# Patient Record
Sex: FEMALE | Race: WHITE | NOT HISPANIC OR LATINO | Employment: FULL TIME | URBAN - METROPOLITAN AREA
[De-identification: names, ages, dates, MRNs, and addresses within clinical notes are randomized per-mention and may not be internally consistent; named-entity substitution may affect disease eponyms.]

---

## 2017-03-01 ENCOUNTER — DOCUMENTATION (OUTPATIENT)
Dept: PHYSICAL THERAPY | Facility: HOSPITAL | Age: 62
End: 2017-03-01

## 2017-04-10 ENCOUNTER — OFFICE VISIT (OUTPATIENT)
Dept: RETAIL CLINIC | Facility: CLINIC | Age: 62
End: 2017-04-10

## 2017-04-10 VITALS
RESPIRATION RATE: 20 BRPM | OXYGEN SATURATION: 98 % | SYSTOLIC BLOOD PRESSURE: 124 MMHG | HEART RATE: 111 BPM | TEMPERATURE: 98.4 F | DIASTOLIC BLOOD PRESSURE: 80 MMHG

## 2017-04-10 DIAGNOSIS — J01.00 ACUTE NON-RECURRENT MAXILLARY SINUSITIS: Primary | ICD-10-CM

## 2017-04-10 PROCEDURE — 99213 OFFICE O/P EST LOW 20 MIN: CPT | Performed by: NURSE PRACTITIONER

## 2017-04-10 RX ORDER — AMOXICILLIN AND CLAVULANATE POTASSIUM 875; 125 MG/1; MG/1
1 TABLET, FILM COATED ORAL 2 TIMES DAILY
Qty: 14 TABLET | Refills: 0 | Status: SHIPPED | OUTPATIENT
Start: 2017-04-10 | End: 2017-04-17

## 2017-04-10 RX ORDER — CETIRIZINE HYDROCHLORIDE 10 MG/1
10 TABLET ORAL DAILY
COMMUNITY

## 2017-04-10 RX ORDER — FLUTICASONE PROPIONATE 50 MCG
2 SPRAY, SUSPENSION (ML) NASAL DAILY
COMMUNITY

## 2017-04-10 NOTE — PROGRESS NOTES
Chon Barry is a 62 y.o. female.     Sinusitis   This is a new problem. Episode onset: 10-11 days ago. The problem has been gradually worsening since onset. There has been no fever. Associated symptoms include congestion, coughing, headaches, sinus pressure and a sore throat. Pertinent negatives include no chills, diaphoresis, ear pain, hoarse voice, neck pain, shortness of breath or sneezing. Treatments tried: herbal remadies. The treatment provided no relief.       The following portions of the patient's history were reviewed and updated as appropriate: allergies, current medications, past family history, past medical history, past social history, past surgical history and problem list.    Review of Systems   Constitutional: Negative for appetite change, chills, diaphoresis, fatigue and fever.   HENT: Positive for congestion, postnasal drip, rhinorrhea, sinus pressure and sore throat. Negative for dental problem, ear discharge, ear pain, facial swelling, hearing loss, hoarse voice, mouth sores, nosebleeds, sneezing, tinnitus, trouble swallowing and voice change.    Eyes: Negative for pain, discharge, redness and itching.   Respiratory: Positive for cough. Negative for chest tightness, shortness of breath, wheezing and stridor.    Cardiovascular: Negative for chest pain and palpitations.   Gastrointestinal: Negative for abdominal pain, constipation, diarrhea, nausea and vomiting.   Genitourinary: Negative for decreased urine volume.   Musculoskeletal: Positive for myalgias. Negative for neck pain and neck stiffness.   Skin: Negative for rash.   Allergic/Immunologic: Positive for environmental allergies.   Neurological: Positive for headaches. Negative for dizziness, syncope and weakness.       Objective   Physical Exam   Constitutional: She is oriented to person, place, and time. She appears well-developed and well-nourished. She is cooperative.  Non-toxic appearance. She does not appear ill. No distress.    HENT:   Right Ear: Hearing, tympanic membrane, external ear and ear canal normal.   Left Ear: Hearing, tympanic membrane, external ear and ear canal normal.   Nose: Mucosal edema and rhinorrhea present. Right sinus exhibits maxillary sinus tenderness. Right sinus exhibits no frontal sinus tenderness. Left sinus exhibits maxillary sinus tenderness. Left sinus exhibits no frontal sinus tenderness.   Mouth/Throat: Uvula is midline and mucous membranes are normal. Posterior oropharyngeal erythema present. No oropharyngeal exudate or posterior oropharyngeal edema. Tonsils are 2+ on the right. Tonsils are 2+ on the left. No tonsillar exudate.   Eyes: Conjunctivae and lids are normal.   Cardiovascular: Normal rate, regular rhythm, S1 normal and S2 normal.    Pulmonary/Chest: Effort normal and breath sounds normal.   Abdominal: Soft. Bowel sounds are normal. There is no tenderness.   Lymphadenopathy:     She has no cervical adenopathy.   Neurological: She is alert and oriented to person, place, and time.   Skin: Skin is warm and dry. She is not diaphoretic. No pallor.   Vitals reviewed.      Assessment/Plan   Celina was seen today for sinusitis.    Diagnoses and all orders for this visit:    Acute non-recurrent maxillary sinusitis  -     amoxicillin-clavulanate (AUGMENTIN) 875-125 MG per tablet; Take 1 tablet by mouth 2 (Two) Times a Day for 7 days.          -     Follow up with PCP for persistent symptoms        -     Follow up with urgent treatment for worsening symptoms

## 2017-05-07 ENCOUNTER — OFFICE VISIT (OUTPATIENT)
Dept: RETAIL CLINIC | Facility: CLINIC | Age: 62
End: 2017-05-07

## 2017-05-07 ENCOUNTER — HOSPITAL ENCOUNTER (EMERGENCY)
Facility: HOSPITAL | Age: 62
Discharge: HOME | End: 2017-05-07
Attending: EMERGENCY MEDICINE | Admitting: EMERGENCY MEDICINE

## 2017-05-07 VITALS
HEIGHT: 62 IN | HEART RATE: 88 BPM | TEMPERATURE: 98.1 F | BODY MASS INDEX: 45.08 KG/M2 | WEIGHT: 245 LBS | OXYGEN SATURATION: 98 % | DIASTOLIC BLOOD PRESSURE: 92 MMHG | RESPIRATION RATE: 18 BRPM | SYSTOLIC BLOOD PRESSURE: 147 MMHG

## 2017-05-07 VITALS
HEART RATE: 76 BPM | RESPIRATION RATE: 18 BRPM | OXYGEN SATURATION: 98 % | TEMPERATURE: 97 F | DIASTOLIC BLOOD PRESSURE: 78 MMHG | SYSTOLIC BLOOD PRESSURE: 110 MMHG

## 2017-05-07 DIAGNOSIS — M54.50 ACUTE BILATERAL LOW BACK PAIN WITHOUT SCIATICA: Primary | ICD-10-CM

## 2017-05-07 DIAGNOSIS — E11.9 TYPE 2 DIABETES MELLITUS WITHOUT COMPLICATION, WITHOUT LONG-TERM CURRENT USE OF INSULIN (HCC): ICD-10-CM

## 2017-05-07 DIAGNOSIS — R39.9 URINARY TRACT INFECTION SYMPTOMS: Primary | ICD-10-CM

## 2017-05-07 PROBLEM — R35.0 URINARY FREQUENCY: Status: ACTIVE | Noted: 2017-05-07

## 2017-05-07 LAB
ALBUMIN SERPL-MCNC: 4.2 G/DL (ref 3.5–5.2)
ALBUMIN/GLOB SERPL: 1.3 G/DL
ALP SERPL-CCNC: 87 U/L (ref 40–129)
ALT SERPL W P-5'-P-CCNC: 35 U/L (ref 5–33)
ANION GAP SERPL CALCULATED.3IONS-SCNC: 16 MMOL/L
AST SERPL-CCNC: 28 U/L (ref 5–32)
BASOPHILS # BLD AUTO: 0.04 10*3/MM3 (ref 0–0.2)
BASOPHILS NFR BLD AUTO: 0.5 % (ref 0–2)
BILIRUB BLD-MCNC: NEGATIVE MG/DL
BILIRUB SERPL-MCNC: 0.5 MG/DL (ref 0.2–1.2)
BILIRUB UR QL STRIP: NEGATIVE
BUN BLD-MCNC: 16 MG/DL (ref 8–23)
BUN/CREAT SERPL: 20.8 (ref 7–25)
CALCIUM SPEC-SCNC: 9 MG/DL (ref 8.8–10.5)
CHLORIDE SERPL-SCNC: 100 MMOL/L (ref 98–107)
CLARITY UR: CLEAR
CLARITY, POC: CLEAR
CO2 SERPL-SCNC: 24 MMOL/L (ref 22–29)
COLOR UR: NORMAL
COLOR UR: YELLOW
CREAT BLD-MCNC: 0.77 MG/DL (ref 0.57–1)
DEPRECATED RDW RBC AUTO: 49.2 FL (ref 37–54)
EOSINOPHIL # BLD AUTO: 0.18 10*3/MM3 (ref 0.1–0.3)
EOSINOPHIL NFR BLD AUTO: 2.4 % (ref 0–4)
ERYTHROCYTE [DISTWIDTH] IN BLOOD BY AUTOMATED COUNT: 14.4 % (ref 11.5–14.5)
GFR SERPL CREATININE-BSD FRML MDRD: 76 ML/MIN/1.73
GLOBULIN UR ELPH-MCNC: 3.2 GM/DL
GLUCOSE BLD-MCNC: 205 MG/DL (ref 65–99)
GLUCOSE UR STRIP-MCNC: ABNORMAL MG/DL
GLUCOSE UR STRIP-MCNC: NEGATIVE MG/DL
HBA1C MFR BLD: 6 % (ref 4.8–5.6)
HCT VFR BLD AUTO: 41.1 % (ref 37–47)
HGB BLD-MCNC: 13.8 G/DL (ref 12–16)
HGB UR QL STRIP.AUTO: NEGATIVE
IMM GRANULOCYTES # BLD: 0.02 10*3/MM3 (ref 0–0.03)
IMM GRANULOCYTES NFR BLD: 0.3 % (ref 0–0.5)
KETONES UR QL STRIP: NEGATIVE
KETONES UR QL: NEGATIVE
LEUKOCYTE EST, POC: NEGATIVE
LEUKOCYTE ESTERASE UR QL STRIP.AUTO: NEGATIVE
LYMPHOCYTES # BLD AUTO: 2.81 10*3/MM3 (ref 0.6–4.8)
LYMPHOCYTES NFR BLD AUTO: 38.2 % (ref 20–45)
MCH RBC QN AUTO: 31.4 PG (ref 27–31)
MCHC RBC AUTO-ENTMCNC: 33.6 G/DL (ref 31–37)
MCV RBC AUTO: 93.4 FL (ref 81–99)
MONOCYTES # BLD AUTO: 0.5 10*3/MM3 (ref 0–1)
MONOCYTES NFR BLD AUTO: 6.8 % (ref 3–8)
NEUTROPHILS # BLD AUTO: 3.81 10*3/MM3 (ref 1.5–8.3)
NEUTROPHILS NFR BLD AUTO: 51.8 % (ref 45–70)
NITRITE UR QL STRIP: NEGATIVE
NITRITE UR-MCNC: NEGATIVE MG/ML
NRBC BLD MANUAL-RTO: 0 /100 WBC (ref 0–0)
PH UR STRIP.AUTO: <=5 [PH] (ref 4.5–8)
PH UR: 5 [PH] (ref 5–8)
PLATELET # BLD AUTO: 264 10*3/MM3 (ref 140–500)
PMV BLD AUTO: 10.2 FL (ref 7.4–10.4)
POTASSIUM BLD-SCNC: 3.6 MMOL/L (ref 3.5–5.2)
PROT SERPL-MCNC: 7.4 G/DL (ref 6–8.5)
PROT UR QL STRIP: NEGATIVE
PROT UR STRIP-MCNC: NEGATIVE MG/DL
RBC # BLD AUTO: 4.4 10*6/MM3 (ref 4.2–5.4)
RBC # UR STRIP: NEGATIVE /UL
SODIUM BLD-SCNC: 140 MMOL/L (ref 136–145)
SP GR UR STRIP: 1.02 (ref 1–1.03)
SP GR UR: 1.03 (ref 1–1.03)
UROBILINOGEN UR QL STRIP: ABNORMAL
UROBILINOGEN UR QL: NORMAL
WBC NRBC COR # BLD: 7.36 10*3/MM3 (ref 4.8–10.8)

## 2017-05-07 PROCEDURE — 99213 OFFICE O/P EST LOW 20 MIN: CPT | Performed by: NURSE PRACTITIONER

## 2017-05-07 PROCEDURE — 99284 EMERGENCY DEPT VISIT MOD MDM: CPT | Performed by: EMERGENCY MEDICINE

## 2017-05-07 PROCEDURE — 99283 EMERGENCY DEPT VISIT LOW MDM: CPT

## 2017-05-07 PROCEDURE — 80053 COMPREHEN METABOLIC PANEL: CPT | Performed by: EMERGENCY MEDICINE

## 2017-05-07 PROCEDURE — 83036 HEMOGLOBIN GLYCOSYLATED A1C: CPT | Performed by: EMERGENCY MEDICINE

## 2017-05-07 PROCEDURE — 85025 COMPLETE CBC W/AUTO DIFF WBC: CPT | Performed by: EMERGENCY MEDICINE

## 2017-05-07 PROCEDURE — 81003 URINALYSIS AUTO W/O SCOPE: CPT | Performed by: EMERGENCY MEDICINE

## 2017-05-07 PROCEDURE — 81003 URINALYSIS AUTO W/O SCOPE: CPT | Performed by: NURSE PRACTITIONER

## 2017-05-07 RX ORDER — DICLOFENAC SODIUM 75 MG/1
75 TABLET, DELAYED RELEASE ORAL 2 TIMES DAILY PRN
Qty: 20 TABLET | Refills: 0 | Status: SHIPPED | OUTPATIENT
Start: 2017-05-07

## 2017-05-07 RX ORDER — TRAMADOL HYDROCHLORIDE 50 MG/1
TABLET ORAL
Qty: 24 TABLET | Refills: 0 | Status: SHIPPED | OUTPATIENT
Start: 2017-05-07

## 2017-05-07 RX ORDER — CYCLOBENZAPRINE HCL 10 MG
10 TABLET ORAL ONCE
Status: COMPLETED | OUTPATIENT
Start: 2017-05-07 | End: 2017-05-07

## 2017-05-07 RX ORDER — TRAMADOL HYDROCHLORIDE 50 MG/1
100 TABLET ORAL ONCE
Status: COMPLETED | OUTPATIENT
Start: 2017-05-07 | End: 2017-05-07

## 2017-05-07 RX ORDER — CYCLOBENZAPRINE HCL 10 MG
10 TABLET ORAL 3 TIMES DAILY PRN
Qty: 24 TABLET | Refills: 0 | Status: SHIPPED | OUTPATIENT
Start: 2017-05-07

## 2017-05-07 RX ORDER — KETOROLAC TROMETHAMINE 10 MG/1
20 TABLET, FILM COATED ORAL ONCE
Status: COMPLETED | OUTPATIENT
Start: 2017-05-07 | End: 2017-05-07

## 2017-05-07 RX ADMIN — TRAMADOL HYDROCHLORIDE 100 MG: 50 TABLET, FILM COATED ORAL at 21:40

## 2017-05-07 RX ADMIN — KETOROLAC TROMETHAMINE 20 MG: 10 TABLET, FILM COATED ORAL at 21:41

## 2017-05-07 RX ADMIN — CYCLOBENZAPRINE HYDROCHLORIDE 10 MG: 10 TABLET, FILM COATED ORAL at 21:40

## 2017-05-10 ENCOUNTER — TELEPHONE (OUTPATIENT)
Dept: RETAIL CLINIC | Facility: CLINIC | Age: 62
End: 2017-05-10

## 2017-05-10 LAB
BACTERIA UR CULT: NORMAL
BACTERIA UR CULT: NORMAL

## 2017-06-16 ENCOUNTER — HOSPITAL ENCOUNTER (EMERGENCY)
Facility: HOSPITAL | Age: 62
Discharge: HOME | End: 2017-06-16
Attending: EMERGENCY MEDICINE | Admitting: EMERGENCY MEDICINE

## 2017-06-16 ENCOUNTER — APPOINTMENT (OUTPATIENT)
Dept: GENERAL RADIOLOGY | Facility: HOSPITAL | Age: 62
End: 2017-06-16

## 2017-06-16 VITALS
BODY MASS INDEX: 43.79 KG/M2 | DIASTOLIC BLOOD PRESSURE: 71 MMHG | HEART RATE: 70 BPM | HEIGHT: 62 IN | WEIGHT: 238 LBS | TEMPERATURE: 97.7 F | SYSTOLIC BLOOD PRESSURE: 133 MMHG | RESPIRATION RATE: 14 BRPM | OXYGEN SATURATION: 97 %

## 2017-06-16 DIAGNOSIS — M77.8 TENDINITIS OF LEFT WRIST: Primary | ICD-10-CM

## 2017-06-16 PROCEDURE — 99282 EMERGENCY DEPT VISIT SF MDM: CPT | Performed by: EMERGENCY MEDICINE

## 2017-06-16 PROCEDURE — 99282 EMERGENCY DEPT VISIT SF MDM: CPT

## 2017-06-16 PROCEDURE — 73110 X-RAY EXAM OF WRIST: CPT

## 2017-06-16 RX ORDER — MONTELUKAST SODIUM 10 MG/1
10 TABLET ORAL NIGHTLY
COMMUNITY

## 2017-06-16 RX ORDER — DICLOFENAC SODIUM 75 MG/1
75 TABLET, DELAYED RELEASE ORAL 2 TIMES DAILY PRN
Qty: 20 TABLET | Refills: 0 | Status: SHIPPED | OUTPATIENT
Start: 2017-06-16

## 2017-06-16 RX ORDER — TRAMADOL HYDROCHLORIDE 50 MG/1
50 TABLET ORAL EVERY 6 HOURS PRN
Qty: 24 TABLET | Refills: 0 | Status: SHIPPED | OUTPATIENT
Start: 2017-06-16

## 2017-06-16 NOTE — ED PROVIDER NOTES
Subjective     History provided by:  Patient    History of Present Illness    · Chief complaint: Left wrist pain    · Location: Volar aspect of left wrist    · Quality/Severity: Soreness and pain.    · Timing/Onset: Pain started about a month ago.    · Modifying Factors: Patient states leftover Ultram tablets would help temporarily.  She states she would wear a wrist splint that would help, but when her wrist started feeling better she stopped wearing it and it started hurting again.  The patient states he uses of her left hand and movement of her left wrist exacerbates the pain.      · Associated symptoms: She denies any numbness or tingling in her fingers.    · Narrative: The patient is a 62-year-old white female complaining of left wrist pain for a month.  The pain is exacerbated by range of motion of the wrist.  She was seen by her PCP a month ago and had an x-ray taken and was told she had arthritis.  She states that splinting it would help the pain, but it would recur when she stopped wearing the splint.  Today she was using her hands and twisted her wrist and had an acute exacerbation severe pain.  The pain is centered on the volar aspect of the wrist, not the dorsal aspect.    ED Triage Vitals   Temp Heart Rate Resp BP SpO2   06/16/17 1654 06/16/17 1654 06/16/17 1654 06/16/17 1654 06/16/17 1654   97.7 °F (36.5 °C) 79 18 141/87 97 %      Temp src Heart Rate Source Patient Position BP Location FiO2 (%)   -- 06/16/17 1654 06/16/17 1654 06/16/17 1654 --    Monitor Sitting Right arm        Review of Systems   Constitutional: Negative for activity change, appetite change, chills, diaphoresis, fatigue and fever.   HENT: Negative for congestion, dental problem, ear pain, hearing loss, mouth sores, postnasal drip, rhinorrhea, sinus pressure, sore throat, trouble swallowing and voice change.    Eyes: Negative for photophobia, pain, discharge, redness and visual disturbance.   Respiratory: Negative for cough, chest  tightness, shortness of breath, wheezing and stridor.    Cardiovascular: Negative for chest pain, palpitations and leg swelling.   Gastrointestinal: Negative for abdominal pain, diarrhea, nausea and vomiting.   Genitourinary: Negative for difficulty urinating, dysuria, flank pain, frequency, hematuria and urgency.   Musculoskeletal: Negative for arthralgias, back pain, gait problem, joint swelling, myalgias, neck pain and neck stiffness.   Skin: Negative for color change and rash.   Neurological: Negative for dizziness, tremors, seizures, syncope, facial asymmetry, speech difficulty, weakness, light-headedness, numbness and headaches.   Hematological: Negative for adenopathy.   Psychiatric/Behavioral: Negative.  Negative for confusion and decreased concentration. The patient is not nervous/anxious.        Past Medical History:   Diagnosis Date   • Osteoarthritis        Allergies   Allergen Reactions   • Latex Rash       Past Surgical History:   Procedure Laterality Date   • SHOULDER SURGERY Right        Family History   Problem Relation Age of Onset   • No Known Problems Mother    • No Known Problems Father        Social History     Social History   • Marital status: Unknown     Spouse name: N/A   • Number of children: N/A   • Years of education: N/A     Social History Main Topics   • Smoking status: Former Smoker   • Smokeless tobacco: Never Used   • Alcohol use No   • Drug use: No   • Sexual activity: Defer     Other Topics Concern   • None     Social History Narrative           Objective   Physical Exam   Constitutional: She is oriented to person, place, and time. She appears well-developed and well-nourished. No distress.   Musculoskeletal:   The left wrist is nontender.  There is some mild swelling over the radial dorsal aspect of the left hand.  Only tenderness or deformity of the left hand.  Flexion of the fingers of the left hand causes some volar pain in the volar aspect of the left wrist.  Left hand is  neurovascular intact.  There is no pain to percussion or palpation of the carpal tunnel of the left wrist.   Neurological: She is alert and oriented to person, place, and time. No cranial nerve deficit.   No focal motor sensory deficit.   Skin: Skin is warm and dry. No rash noted. She is not diaphoretic. No erythema.   Psychiatric: She has a normal mood and affect. Her behavior is normal. Judgment and thought content normal.   Nursing note and vitals reviewed.      Procedures         ED Course  ED Course   Comment By Time   Mike Report [id number] Tyler Lowe MD 06/16 0629   I discussed with the patient her negative x-ray results and my diagnosis of tendinitis.  I recommended rest, use a splint she are he has, and follow-up with Dr. walters.  She is prescribed Voltaren and Ultram for pain. Tyler Lowe MD 06/30 0444                  MDM  Number of Diagnoses or Management Options  Tendinitis of left wrist: new and requires workup     Amount and/or Complexity of Data Reviewed  Tests in the radiology section of CPT®: ordered and reviewed  Independent visualization of images, tracings, or specimens: yes    Risk of Complications, Morbidity, and/or Mortality  Presenting problems: low  Diagnostic procedures: low  Management options: low  General comments: My differential diagnosis includes but is not limited to: Wrist fracture, wrist dislocation, degenerative joint disease, tendinitis, carpal tunnel syndrome, radiculopathy, wrist or hand sprain, ganglion cyst    Patient Progress  Patient progress: stable      Final diagnoses:   Tendinitis of left wrist           Labs Reviewed - No data to display  XR Wrist 3+ View Left   ED Interpretation   No fracture or dislocation.  No acute bony or soft tissue   abnormality.  Mild degenerative changes of the bones.             Medication List      Changed          * diclofenac 75 MG EC tablet   Commonly known as:  VOLTAREN   Take 1 tablet by mouth 2 (Two) Times a Day As Needed  (Pain) for up to 20   doses.   What changed:  Another medication with the same name was added. Make sure   you understand how and when to take each.       * diclofenac 75 MG EC tablet   Commonly known as:  VOLTAREN   Take 1 tablet by mouth 2 (Two) Times a Day As Needed (Pain) for up to 20   doses.   What changed:  You were already taking a medication with the same name,   and this prescription was added. Make sure you understand how and when to   take each.       * traMADol 50 MG tablet   Commonly known as:  ULTRAM   1 - 2 tablets po q 6 hours PRN sever pain   What changed:  Another medication with the same name was added. Make sure   you understand how and when to take each.       * traMADol 50 MG tablet   Commonly known as:  ULTRAM   Take 1 tablet by mouth Every 6 (Six) Hours As Needed for Moderate Pain   (4-6) for up to 24 doses.   What changed:  You were already taking a medication with the same name,   and this prescription was added. Make sure you understand how and when to   take each.       * Notice:  This list has 4 medication(s) that are the same as other   medications prescribed for you. Read the directions carefully, and ask   your doctor or other care provider to review them with you.             Tyler Lowe MD  06/16/17 4077       Tyler Lowe MD  06/30/17 8695

## 2017-08-09 ENCOUNTER — TRANSCRIBE ORDERS (OUTPATIENT)
Dept: ADMINISTRATIVE | Facility: HOSPITAL | Age: 62
End: 2017-08-09

## 2017-08-09 DIAGNOSIS — Z13.9 SCREENING: Primary | ICD-10-CM

## 2017-08-30 ENCOUNTER — APPOINTMENT (OUTPATIENT)
Dept: MAMMOGRAPHY | Facility: HOSPITAL | Age: 62
End: 2017-08-30

## 2017-09-20 ENCOUNTER — APPOINTMENT (OUTPATIENT)
Dept: MAMMOGRAPHY | Facility: HOSPITAL | Age: 62
End: 2017-09-20

## 2017-09-29 ENCOUNTER — HOSPITAL ENCOUNTER (OUTPATIENT)
Dept: MAMMOGRAPHY | Facility: HOSPITAL | Age: 62
Discharge: HOME | End: 2017-09-29
Admitting: FAMILY MEDICINE

## 2017-09-29 DIAGNOSIS — Z13.9 SCREENING: ICD-10-CM

## 2017-09-29 PROCEDURE — G0202 SCR MAMMO BI INCL CAD: HCPCS

## 2017-09-29 PROCEDURE — 77063 BREAST TOMOSYNTHESIS BI: CPT

## 2017-12-13 ENCOUNTER — APPOINTMENT (OUTPATIENT)
Dept: PHYSICAL THERAPY | Facility: HOSPITAL | Age: 62
End: 2017-12-13

## 2017-12-19 ENCOUNTER — HOSPITAL ENCOUNTER (OUTPATIENT)
Dept: PHYSICAL THERAPY | Facility: HOSPITAL | Age: 62
Setting detail: THERAPIES SERIES
Discharge: HOME | End: 2017-12-19

## 2017-12-19 DIAGNOSIS — M54.30 SCIATICA, UNSPECIFIED LATERALITY: Primary | ICD-10-CM

## 2017-12-19 PROCEDURE — 97161 PT EVAL LOW COMPLEX 20 MIN: CPT | Performed by: PHYSICAL THERAPIST

## 2017-12-19 PROCEDURE — G8979 MOBILITY GOAL STATUS: HCPCS | Performed by: PHYSICAL THERAPIST

## 2017-12-19 PROCEDURE — G8978 MOBILITY CURRENT STATUS: HCPCS | Performed by: PHYSICAL THERAPIST

## 2017-12-19 NOTE — THERAPY EVALUATION
"    Outpatient Physical Therapy Ortho Initial Evaluation  [facility]     Patient Name: Celina Barry  : 1955  MRN: [id number]  Today's Date: 2017      Visit Date: 2017    Patient Active Problem List   Diagnosis   • Urinary frequency   • Acute midline low back pain without sciatica        Past Medical History:   Diagnosis Date   • Osteoarthritis         Past Surgical History:   Procedure Laterality Date   • SHOULDER SURGERY Right        Visit Dx:     ICD-10-CM ICD-9-CM   1. Sciatica, unspecified laterality M54.30 724.3             Patient History       17 1000          History    Chief Complaint Pain  -SP      Type of Pain Back pain  -SP      Date Current Problem(s) Began --   chronic history of low back and (B) hip pain  -SP      Brief Description of Current Complaint Patient reports she has had chronic low back and and hip pain.  Patient states that she has daily pain in (B) sacral area.  Pain is increased with sitting.  She also notes that she will have pain radiating into (B) posterior thigh area area and what she describes knots going down her leg.  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 uses pillows for positioning in car and uses heat, ice, epsom salt baths and \"depends\" on muscle relaxers  -SP      Pain     Pain Location Buttocks;Back  -SP      Pain at Present 4  -SP      Pain at Best 4  -SP      Pain at Worst 10  -SP      Pain Frequency Constant/continuous  -SP      Pain Description Aching;Tightness   deep  -SP      " What Performance Factors Make the Current Problem(s) WORSE? sitting in car, sitting  -SP      What Performance Factors Make the Current Problem(s) BETTER? heat, muscle relaxors, pain meds  -SP      Tolerance Time- Sitting has severe pain with driving 1 hour, needs to stop every 20 min  -SP      Tolerance Time- Walking can tolerate prolonged walking with support on grocery basket  -SP      Is your sleep disturbed? Yes  -SP      What position do you sleep in? Right sidelying;Left sidelying  -SP      Difficulties at work? difficulty tolerating sustained positions  -SP      Difficulties with ADL's? difficulty riding or driving in car  -SP      Fall Risk Assessment    Any falls in the past year: No  -SP      Daily Activities    Primary Language English  -SP      Are you able to read Yes  -SP      Are you able to write Yes  -SP      How does patient learn best? Listening  -SP      Teaching needs identified Home Exercise Program;Management of Condition  -SP      Patient is concerned about/has problems with Performing home management (household chores, shopping, care of dependents);Performing job responsibilities/community activities (work, school,;Sitting  -SP      Does patient have problems with the following? None  -SP      Barriers to learning None  -SP      Pt Participated in POC and Goals Yes  -SP      Safety    Are you being hurt, hit, or frightened by anyone at home or in your life? No  -SP      Are you being neglected by a caregiver No  -SP        User Key  (r) = Recorded By, (t) = Taken By, (c) = Cosigned By    Initials Name Provider Type    SP Priscila Gonzalez, PT Physical Therapist                PT Ortho       12/19/17 1100    Posture/Observations    Forward Head Mild  -SP    Cervical Lordosis Decreased  -SP    Thoracic Kyphosis Mild  -SP    Lumbar lordosis Increased  -SP    Iliac crests Bilateral:;Normal  -SP    Genu valgus Bilateral:;Increased  -SP    Posture/Observations Comments appears to have right  anterior innominate  -SP    Neural Tension Signs- Lower Quarter Clearing    SLR Bilateral:;Negative  -SP    Sensory Screen for Light Touch- Lower Quarter Clearing    L2 (anterior mid thigh) Left:;Diminished  -SP    L5 (lateral lower leg/great toe) Right:;Diminished  -SP    Myotomal Screen- Lower Quarter Clearing    Hip flexion (L2) Right:;3+ (Fair +);Left:;4- (Good -)  -SP    Knee extension (L3) Bilateral:;4- (Good -)  -SP    Ankle DF (L4) 4+ (Good +);Bilateral:  -SP    Great toe extension (L5) Bilateral:;4+ (Good +)  -SP    Ankle PF (S1) Bilateral:;4- (Good -)  -SP    Knee flexion (S2) Right:;3+ (Fair +);Left:;4- (Good -)  -SP    Lumbosacral Palpation    SI Bilateral:;Tender  -SP    Erector Spinae (Paraspinals) Bilateral:;Tender;Guarded/taut  -SP    Lumbar/SI Special Tests    Standing Flexion Test (SI Dysfunction) Right:;Positive  -SP    SLR (Neural Tension) Bilateral:;Negative  -SP    Trunk    Flexion AROM Deficit decreased by 50% from full range  -SP    Extension AROM Deficit decreased by 25% from full range  -SP    Lt Lat Flexion AROM Deficit decreased by 25% from full range  -SP    Right Lateral Flexion AROM Deficit decreased by 25% from full range   -SP    Lt Rotation AROM Deficit decreased by 50% from full range  -SP    Right Rotation AROM Deficit decreased by 25% from full range  -SP    Trunk    Trunk Flexion Gross Movement (2+/5) poor plus  -SP    Left Trunk Rotat Int & Ext Abd Obliques (2+/5) poor plus  -SP    Right Trunk Rotat Int & Ext Abd Obliques (2/5) poor  -SP    Lower Extremity Flexibility    Hamstrings Bilateral:;Moderately limited  -SP    Quadriceps Bilateral:;Mildly limited  -SP    Hip External Rotators Bilateral:;Moderately limited  -SP    Hip Internal Rotators Bilateral:;Moderately limited  -SP    Gastrocnemius Bilateral:;Moderately limited  -SP    Transfers    Transfers, Sit-Stand [place] independent  -SP    Transfer, Comment uses UEs to assist with transfers to stand  -SP    Gait  Assessment/Treatment    Gait, [place] Level independent  -SP    Gait, Gait Pattern Analysis swing-through gait  -SP    Gait, Gait Deviations chloé decreased;step length decreased;forward flexed posture  -SP    Stairs Assessment/Treatment    Stairs, Comment independent with hold to rail/intermittently uses step to pattern  -SP      User Key  (r) = Recorded By, (t) = Taken By, (c) = Cosigned By    Initials Name Provider Type    ARLINE Gonzalez, PT Physical Therapist                      Therapy Education  Given: HEP  Program: New  How Provided: Verbal  Provided to: Patient  Level of Understanding: Verbalized           PT OP Goals       12/19/17 1300       PT Short Term Goals    STG 1 Patient demonstrates trunk AROM to wfl without complaints of pain at end ranges  -SP     STG 2 Patient reports decreased radicular type symptoms into (B) LEs by 25%  -SP     STG 3 Patient reports she is able to tolerate sit or ride/drive car for 30 min with pain level <3/10 at worst  -SP     Long Term Goals    LTG 1 Patient reports improved ability to drive or sit for >1 hour with pain level <2/10  -SP     LTG 2 Patient demonstrates increased trunk and LE strength by one muscle grade to better tolerate ADLs  -SP     LTG 3 Patient independent with HEP  -SP     Time Calculation    PT Goal Re-Cert Due Date 01/18/18  -SP       User Key  (r) = Recorded By, (t) = Taken By, (c) = Cosigned By    Initials Name Provider Type    ARLINE Gonzalez, PT Physical Therapist                PT Assessment/Plan       12/19/17 1349 12/19/17 1322    PT Assessment    Functional Limitations  Limitation in home management;Limitations in community activities;Limitations in functional capacity and performance;Performance in leisure activities;Performance in work activities;Performance in self-care ADL  -SP    Impairments  Endurance;Pain;Muscle strength;Posture;Range of motion;Poor body mechanics  -SP    Assessment Comments  Patient with  chronic history of low back and hip pain.  She presents with pain, difficulty tolerating sitting or static positions, decreased trunk and LE strength and impaired ability to complete home and community ADLs  -SP    Please refer to paper survey for additional self-reported information No  -SP     Rehab Potential Good  -SP     Patient/caregiver participated in establishment of treatment plan and goals Yes  -SP     Patient would benefit from skilled therapy intervention Yes  -SP     PT Plan    PT Frequency 2x/week  -SP     Predicted Duration of Therapy Intervention (days/wks) 4 weeks  -SP     Planned CPT's? PT EVAL LOW COMPLEXITY: 11750;PT THER PROC EA 15 MIN: 30777;PT ELECTRICAL STIM UNATTEND: ;PT HOT OR COLD PACK TREAT MCARE;PT MANUAL THERAPY EA 15 MIN: 47448  -SP       User Key  (r) = Recorded By, (t) = Taken By, (c) = Cosigned By    Initials Name Provider Type    ARLINE Gonzalez, PT Physical Therapist                Modalities       12/19/17 1000          Moist Heat    MH Applied Yes  -SP      Location (B) L/S area  -SP      Rx Minutes 12 mins  -SP      MH Prior to Rx Yes  -SP        User Key  (r) = Recorded By, (t) = Taken By, (c) = Cosigned By    Initials Name Provider Type    ARLINE Gonzalez, PT Physical Therapist              Exercises       12/19/17 1000          Exercise 1    Exercise Name 1 LTR  -SP      Reps 1 10  -SP      Time (Seconds) 1 5  -SP      Exercise 2    Exercise Name 2 SKTC  -SP      Reps 2 3  -SP      Time (Seconds) 2 20  -SP      Exercise 3    Exercise Name 3 piriformis stretch  -SP      Reps 3 3  -SP      Time (Seconds) 3 20  -SP      Exercise 4    Exercise Name 4 PPT  -SP      Reps 4 15  -SP      Time (Seconds) 4 5  -SP        User Key  (r) = Recorded By, (t) = Taken By, (c) = Cosigned By    Initials Name Provider Type    ARLINE Gonzalez, PT Physical Therapist           Manual Rx (last 36 hours)      Manual Treatments       12/19/17 1000          Manual  Rx 1    Manual Rx 1 Location pelvis  -SP      Manual Rx 1 Type MET: shotgun and right anterior innominate correct  -SP        User Key  (r) = Recorded By, (t) = Taken By, (c) = Cosigned By    Initials Name Provider Type    SP Priscila Gonzalez, PT Physical Therapist                      Outcome Measure Options: Other Outcome Measure  Other Outcome Measure Tool Used  Other Outcome Measure Tool Comments:  (pt given back index but does not complete)      Time Calculation:   Start Time: 1015  Stop Time: 1130  Time Calculation (min): 75 min     Therapy Charges for Today     Code Description Service Date Service Provider Modifiers Qty    64956377973 HC PT MOBILITY CURRENT 12/19/2017 Priscila Gonzalez, PT GP, CL 1    11448417029 HC PT MOBILITY PROJECTED 12/19/2017 Priscila Gonzalez, PT GP, CI 1    [id number] HC PT EVAL LOW COMPLEXITY 3 12/19/2017 Priscila Gonzalez, PT GP 1          PT G-Codes  PT Professional Judgement Used?: Yes  Outcome Measure Options: Other Outcome Measure  Mobility: Walking and Moving Around Current Status (): At least 60 percent but less than 80 percent impaired, limited or restricted  Mobility: Walking and Moving Around Goal Status (): At least 1 percent but less than 20 percent impaired, limited or restricted         Priscila Gonzalez, VILMA  12/19/2017

## 2017-12-21 ENCOUNTER — HOSPITAL ENCOUNTER (OUTPATIENT)
Dept: PHYSICAL THERAPY | Facility: HOSPITAL | Age: 62
Setting detail: THERAPIES SERIES
Discharge: HOME | End: 2017-12-21

## 2017-12-21 DIAGNOSIS — M54.30 SCIATICA, UNSPECIFIED LATERALITY: Primary | ICD-10-CM

## 2017-12-21 PROCEDURE — 97110 THERAPEUTIC EXERCISES: CPT | Performed by: PHYSICAL THERAPIST

## 2017-12-21 NOTE — THERAPY TREATMENT NOTE
Outpatient Physical Therapy Ortho Treatment Note  GERRY Perez     Patient Name: Celina Barry  : 1955  MRN: [id number]  Today's Date: 2017      Visit Date: 2017    Visit Dx:    ICD-10-CM ICD-9-CM   1. Sciatica, unspecified laterality M54.30 724.3       Patient Active Problem List   Diagnosis   • Urinary frequency   • Acute midline low back pain without sciatica        Past Medical History:   Diagnosis Date   • Osteoarthritis         Past Surgical History:   Procedure Laterality Date   • SHOULDER SURGERY Right              PT Ortho       17 1200    Subjective Comments    Subjective Comments Patient reports that she tried exercises at home.  States she did notice some numbness and tingling in her toes (B)  -SP      17 1100    Posture/Observations    Forward Head Mild  -SP    Cervical Lordosis Decreased  -SP    Thoracic Kyphosis Mild  -SP    Lumbar lordosis Increased  -SP    Iliac crests Bilateral:;Normal  -SP    Genu valgus Bilateral:;Increased  -SP    Posture/Observations Comments appears to have right anterior innominate  -SP    Neural Tension Signs- Lower Quarter Clearing    SLR Bilateral:;Negative  -SP    Sensory Screen for Light Touch- Lower Quarter Clearing    L2 (anterior mid thigh) Left:;Diminished  -SP    L5 (lateral lower leg/great toe) Right:;Diminished  -SP    Myotomal Screen- Lower Quarter Clearing    Hip flexion (L2) Right:;3+ (Fair +);Left:;4- (Good -)  -SP    Knee extension (L3) Bilateral:;4- (Good -)  -SP    Ankle DF (L4) 4+ (Good +);Bilateral:  -SP    Great toe extension (L5) Bilateral:;4+ (Good +)  -SP    Ankle PF (S1) Bilateral:;4- (Good -)  -SP    Knee flexion (S2) Right:;3+ (Fair +);Left:;4- (Good -)  -SP    Lumbosacral Palpation    SI Bilateral:;Tender  -SP    Erector Spinae (Paraspinals) Bilateral:;Tender;Guarded/taut  -SP    Lumbar/SI Special Tests    Standing Flexion Test (SI Dysfunction) Right:;Positive  -SP    SLR (Neural Tension) Bilateral:;Negative  -SP     Trunk    Flexion AROM Deficit decreased by 50% from full range  -SP    Extension AROM Deficit decreased by 25% from full range  -SP    Lt Lat Flexion AROM Deficit decreased by 25% from full range  -SP    Right Lateral Flexion AROM Deficit decreased by 25% from full range   -SP    Lt Rotation AROM Deficit decreased by 50% from full range  -SP    Right Rotation AROM Deficit decreased by 25% from full range  -SP    Trunk    Trunk Flexion Gross Movement (2+/5) poor plus  -SP    Left Trunk Rotat Int & Ext Abd Obliques (2+/5) poor plus  -SP    Right Trunk Rotat Int & Ext Abd Obliques (2/5) poor  -SP    Lower Extremity Flexibility    Hamstrings Bilateral:;Moderately limited  -SP    Quadriceps Bilateral:;Mildly limited  -SP    Hip External Rotators Bilateral:;Moderately limited  -SP    Hip Internal Rotators Bilateral:;Moderately limited  -SP    Gastrocnemius Bilateral:;Moderately limited  -SP    Transfers    Transfers, Sit-Stand [place] independent  -SP    Transfer, Comment uses UEs to assist with transfers to stand  -SP    Gait Assessment/Treatment    Gait, [place] Level independent  -SP    Gait, Gait Pattern Analysis swing-through gait  -SP    Gait, Gait Deviations chloé decreased;step length decreased;forward flexed posture  -SP    Stairs Assessment/Treatment    Stairs, Comment independent with hold to rail/intermittently uses step to pattern  -SP      User Key  (r) = Recorded By, (t) = Taken By, (c) = Cosigned By    Initials Name Provider Type    ARLINE Gonzalez, PT Physical Therapist                            PT Assessment/Plan       12/21/17 3177       PT Assessment    Assessment Comments Patient tolerates progression of ther-ex well, needs cues for technique with pelvic tilt.  Demonstrates improved pelvic alignment after MET  -SP     PT Plan    PT Plan Comments Continue per POC  -SP       User Key  (r) = Recorded By, (t) = Taken By, (c) = Cosigned By    Initials Name Provider Type    ARLINE Francois  Cuca Gonzalez, PT Physical Therapist                Modalities       12/21/17 1000          Moist Heat    MH Applied Yes  -SP      Location (B) L/S area  -SP      Rx Minutes 12 mins  -SP      MH Prior to Rx Yes  -SP      Ice    Ice Applied Yes  -SP      Location L/S area  -SP      Rx Minutes 10 mins  -SP      Ice S/P Rx Yes  -SP        User Key  (r) = Recorded By, (t) = Taken By, (c) = Cosigned By    Initials Name Provider Type    ARLINE Gonzalez, PT Physical Therapist                Exercises       12/21/17 1200          Subjective Comments    Subjective Comments Patient reports that she tried exercises at home.  States she did notice some numbness and tingling in her toes (B)  -SP      Exercise 1    Exercise Name 1 LTR  -SP      Reps 1 10  -SP      Time (Seconds) 1 5  -SP      Exercise 2    Exercise Name 2 SKTC  -SP      Reps 2 3  -SP      Time (Seconds) 2 20  -SP      Exercise 3    Exercise Name 3 piriformis stretch  -SP      Reps 3 3  -SP      Time (Seconds) 3 20  -SP      Exercise 4    Exercise Name 4 PPT  -SP      Reps 4 15  -SP      Time (Seconds) 4 5  -SP      Exercise 5    Exercise Name 5 PPT with ball squeeze  -SP      Reps 5 10  -SP      Time (Seconds) 5 5  -SP      Exercise 6    Exercise Name 6 PPT with BKFO  -SP      Reps 6 10  -SP      Additional Comments black latex free  -SP      Exercise 7    Exercise Name 7 PPT with ball rolls  -SP      Reps 7 10  -SP        User Key  (r) = Recorded By, (t) = Taken By, (c) = Cosigned By    Initials Name Provider Type    ARLINE Gonzalez, PT Physical Therapist                        Manual Rx (last 36 hours)      Manual Treatments       12/21/17 1200          Manual Rx 1    Manual Rx 1 Location pelvis  -SP      Manual Rx 1 Type MET: shotgun and right anterior innominate correct  -SP        User Key  (r) = Recorded By, (t) = Taken By, (c) = Cosigned By    Initials Name Provider Type    ARLINE Gonzalez, PT Physical Therapist                              Time Calculation:   Start Time: 1200  Stop Time: 1305  Time Calculation (min): 65 min    Therapy Charges for Today     Code Description Service Date Service Provider Modifiers Qty    70238848038 HC PT HOT OR COLD PACK TREAT MCARE 12/21/2017 Priscila Gonzalez, PT GP 1    04885674470 HC PT THER PROC EA 15 MIN 12/21/2017 Priscila Gonzalez, PT GP 1                    Priscila Gonzalez, PT  12/21/2017

## 2017-12-28 ENCOUNTER — HOSPITAL ENCOUNTER (OUTPATIENT)
Dept: PHYSICAL THERAPY | Facility: HOSPITAL | Age: 62
Setting detail: THERAPIES SERIES
Discharge: HOME | End: 2017-12-28

## 2017-12-28 DIAGNOSIS — M54.30 SCIATICA, UNSPECIFIED LATERALITY: Primary | ICD-10-CM

## 2017-12-28 PROCEDURE — 97110 THERAPEUTIC EXERCISES: CPT | Performed by: PHYSICAL THERAPIST

## 2017-12-28 PROCEDURE — 97035 APP MDLTY 1+ULTRASOUND EA 15: CPT | Performed by: PHYSICAL THERAPIST

## 2017-12-28 NOTE — THERAPY TREATMENT NOTE
Outpatient Physical Therapy Ortho Treatment Note  GERRY McintoshYancey     Patient Name: Celina Barry  : 1955  MRN: [id number]  Today's Date: 2017      Visit Date: 2017    Visit Dx:    ICD-10-CM ICD-9-CM   1. Sciatica, unspecified laterality M54.30 724.3       Patient Active Problem List   Diagnosis   • Urinary frequency   • Acute midline low back pain without sciatica        Past Medical History:   Diagnosis Date   • Osteoarthritis         Past Surgical History:   Procedure Laterality Date   • SHOULDER SURGERY Right              PT Ortho       17 0850    Subjective Comments    Subjective Comments Patient reports that she has been driving a lot over the last few days and is not sure if that has caused increased pain, but she is having increased low back pain and tightness.    -SP    Posture/Observations    Posture/Observations Comments presents with apparent right anterior innominate  -SP      User Key  (r) = Recorded By, (t) = Taken By, (c) = Cosigned By    Initials Name Provider Type    ARLINE Gonzalez, PT Physical Therapist                            PT Assessment/Plan       17 1042 17 1038    PT Assessment    Assessment Comments  Patient continues to present with pelvic obliquity that improves with MET.  Minimal change in symptoms at this point  -SP    PT Plan    PT Plan Comments Continue with ultrasound and assess effectiveness  -SP       User Key  (r) = Recorded By, (t) = Taken By, (c) = Cosigned By    Initials Name Provider Type    ARLINE Gonzalez, PT Physical Therapist                Modalities       17 0850          Moist Heat    MH Applied Yes  -SP      Location (B) L/S area  -SP      Rx Minutes 12 mins  -SP      MH Prior to Rx Yes  -SP      Ultrasound 32765    Location (B) L/S area  -SP      Rx Minutes 8 min  -SP      Duty Cycle 100  -SP      Frequency 1.0 MHz  -SP      Intensity - Wts/cm 1.5  -SP        User Key  (r) = Recorded By, (t) = Taken By,  (c) = Cosigned By    Initials Name Provider Type    SP Priscila Gutierrezaristeo Gonzalez, PT Physical Therapist                Exercises       12/28/17 0850          Subjective Comments    Subjective Comments Patient reports that she has been driving a lot over the last few days and is not sure if that has caused increased pain, but she is having increased low back pain and tightness.    -SP      Exercise 1    Exercise Name 1 LTR  -SP      Reps 1 10  -SP      Time (Seconds) 1 5  -SP      Exercise 2    Exercise Name 2 SKTC  -SP      Reps 2 3  -SP      Time (Seconds) 2 20  -SP      Exercise 3    Exercise Name 3 piriformis stretch  -SP      Reps 3 3  -SP      Time (Seconds) 3 20  -SP      Exercise 4    Exercise Name 4 PPT  -SP      Reps 4 15  -SP      Time (Seconds) 4 5  -SP      Exercise 5    Exercise Name 5 PPT with ball squeeze  -SP      Reps 5 15  -SP      Time (Seconds) 5 5  -SP      Exercise 6    Exercise Name 6 PPT with BKFO  -SP      Reps 6 20  -SP      Additional Comments black  -SP      Exercise 7    Exercise Name 7 PPT with ball rolls  -SP      Reps 7 15  -SP        User Key  (r) = Recorded By, (t) = Taken By, (c) = Cosigned By    Initials Name Provider Type    SP Priscila Cuca Gonzalez, PT Physical Therapist                        Manual Rx (last 36 hours)      Manual Treatments       12/28/17 0850          Manual Rx 1    Manual Rx 1 Location pelvis  -SP      Manual Rx 1 Type MET: shotgun and right anterior innominate correct  -SP        User Key  (r) = Recorded By, (t) = Taken By, (c) = Cosigned By    Initials Name Provider Type    ARLINE Gonzalez PT Physical Therapist                             Time Calculation:   Start Time: 0850  Stop Time: 1000  Time Calculation (min): 70 min    Therapy Charges for Today     Code Description Service Date Service Provider Modifiers Qty    19631705248 HC PT HOT OR COLD PACK TREAT MCARE 12/28/2017 Priscila Gonzalez, PT GP 1    [id number]  PT ULTRASOUND EA 15  MIN 12/28/2017 Priscila Gonzalez, PT GP 1    [id number]  PT THER PROC EA 15 MIN 12/28/2017 Priscila Gonzalez, PT GP 1                    Priscila Gonzalez, PT  12/28/2017

## 2018-01-02 ENCOUNTER — HOSPITAL ENCOUNTER (OUTPATIENT)
Dept: PHYSICAL THERAPY | Facility: HOSPITAL | Age: 63
Setting detail: THERAPIES SERIES
Discharge: HOME | End: 2018-01-02

## 2018-01-02 DIAGNOSIS — M54.30 SCIATICA, UNSPECIFIED LATERALITY: Primary | ICD-10-CM

## 2018-01-02 PROCEDURE — 97035 APP MDLTY 1+ULTRASOUND EA 15: CPT | Performed by: PHYSICAL THERAPIST

## 2018-01-02 PROCEDURE — 97110 THERAPEUTIC EXERCISES: CPT | Performed by: PHYSICAL THERAPIST

## 2018-01-02 NOTE — THERAPY TREATMENT NOTE
Outpatient Physical Therapy Ortho Treatment Note  GERRY Dayna Cheek     Patient Name: Celina Barry  : 1955  MRN: [id number]  Today's Date: 2018      Visit Date: 2018    Visit Dx:    ICD-10-CM ICD-9-CM   1. Sciatica, unspecified laterality M54.30 724.3       Patient Active Problem List   Diagnosis   • Urinary frequency   • Acute midline low back pain without sciatica        Past Medical History:   Diagnosis Date   • Osteoarthritis         Past Surgical History:   Procedure Laterality Date   • SHOULDER SURGERY Right              PT Ortho       18 1800    Subjective Comments    Subjective Comments Patient reports that she felt ok after last visit but has had increased low back pain over the last couple of days likely due to walking for prolonged periods of time while shopping  -SP      User Key  (r) = Recorded By, (t) = Taken By, (c) = Cosigned By    Initials Name Provider Type    ARLINE Gonzalez, PT Physical Therapist                            PT Assessment/Plan       18 1811       PT Assessment    Assessment Comments Patient has not been able to maintain pelvic alignment but exhibits improvement with MET.  Patient reports decreased discomfort after ultrasound  -SP     PT Plan    PT Plan Comments Continue per POC  -SP       User Key  (r) = Recorded By, (t) = Taken By, (c) = Cosigned By    Initials Name Provider Type    ARLINE Gonzalez, PT Physical Therapist                Modalities       18 1800          Moist Heat    MH Applied Yes  -SP      Location (B) L/S area  -SP      Rx Minutes 12 mins  -SP      MH Prior to Rx Yes  -SP      Ultrasound 88150    Location (B) L/S area  -SP      Rx Minutes 8 min  -SP      Duty Cycle 100  -SP      Frequency 1.0 MHz  -SP      Intensity - Wts/cm 1.5  -SP        User Key  (r) = Recorded By, (t) = Taken By, (c) = Cosigned By    Initials Name Provider Type    ARLINE Gonzalez, PT Physical Therapist                Exercises        01/02/18 1800          Subjective Comments    Subjective Comments Patient reports that she felt ok after last visit but has had increased low back pain over the last couple of days likely due to walking for prolonged periods of time while shopping  -SP      Exercise 1    Exercise Name 1 LTR  -SP      Reps 1 10  -SP      Time (Seconds) 1 5  -SP      Exercise 2    Exercise Name 2 SKTC  -SP      Reps 2 3  -SP      Time (Seconds) 2 20  -SP      Exercise 3    Exercise Name 3 piriformis stretch  -SP      Reps 3 3  -SP      Time (Seconds) 3 20  -SP      Exercise 4    Exercise Name 4 PPT  -SP      Reps 4 15  -SP      Time (Seconds) 4 5  -SP      Exercise 5    Exercise Name 5 PPT with ball squeeze  -SP      Reps 5 15  -SP      Time (Seconds) 5 5  -SP      Exercise 6    Exercise Name 6 PPT with BKFO  -SP      Reps 6 20  -SP      Exercise 7    Exercise Name 7 PPT with ball rolls  -SP      Reps 7 15  -SP        User Key  (r) = Recorded By, (t) = Taken By, (c) = Cosigned By    Initials Name Provider Type    ARLINE Gonzalez PT Physical Therapist                        Manual Rx (last 36 hours)      Manual Treatments       01/02/18 1800          Manual Rx 1    Manual Rx 1 Location pelvis  -SP      Manual Rx 1 Type MET: shotgun and right anterior innominate correct  -SP        User Key  (r) = Recorded By, (t) = Taken By, (c) = Cosigned By    Initials Name Provider Type    ARLINE Gonzalez PT Physical Therapist                             Time Calculation:   Start Time: 1610  Stop Time: 1735  Time Calculation (min): 85 min    Therapy Charges for Today     Code Description Service Date Service Provider Modifiers Qty    34049711718 HC PT HOT OR COLD PACK TREAT MCARE 1/2/2018 Priscila Gonzalez, PT GP 1    34530120973 HC PT ULTRASOUND EA 15 MIN 1/2/2018 Priscila Gonzalez, PT GP 1    69702540549 HC PT THER PROC EA 15 MIN 1/2/2018 Priscila Gonzalez, PT GP 1                    Priscila Thurman  Carlos, PT  1/2/2018

## 2018-01-04 ENCOUNTER — HOSPITAL ENCOUNTER (OUTPATIENT)
Dept: PHYSICAL THERAPY | Facility: HOSPITAL | Age: 63
Setting detail: THERAPIES SERIES
Discharge: HOME | End: 2018-01-04

## 2018-01-04 DIAGNOSIS — M54.30 SCIATICA, UNSPECIFIED LATERALITY: Primary | ICD-10-CM

## 2018-01-04 PROCEDURE — 97110 THERAPEUTIC EXERCISES: CPT | Performed by: PHYSICAL THERAPIST

## 2018-01-04 NOTE — THERAPY TREATMENT NOTE
Outpatient Physical Therapy Ortho Treatment Note  GERRY Dayna Cheek     Patient Name: Celina Barry  : 1955  MRN: [id number]  Today's Date: 2018      Visit Date: 2018    Visit Dx:    ICD-10-CM ICD-9-CM   1. Sciatica, unspecified laterality M54.30 724.3       Patient Active Problem List   Diagnosis   • Urinary frequency   • Acute midline low back pain without sciatica        Past Medical History:   Diagnosis Date   • Osteoarthritis         Past Surgical History:   Procedure Laterality Date   • SHOULDER SURGERY Right              PT Ortho       18 1600    Subjective Comments    Subjective Comments Patient states that she is feeling better today.  She has been very active but is noticing less low back area pain.  She reports that she notices discomfort with sitting on couch and leaning forward to coffee table to reach and tag objects.  Discussed options to improve posture with this task to avoid forward flexion and rotation.    -SP      18 1800    Subjective Comments    Subjective Comments Patient reports that she felt ok after last visit but has had increased low back pain over the last couple of days likely due to walking for prolonged periods of time while shopping  -SP      User Key  (r) = Recorded By, (t) = Taken By, (c) = Cosigned By    Initials Name Provider Type    ARLINE Gonzalez, PT Physical Therapist                            PT Assessment/Plan       18 1725       PT Assessment    Assessment Comments Patient with decreasing reports of pain with ultrasound and ther-ex.  Continues to exhibit pelvic obliquity but not as pronounced  -SP     PT Plan    PT Plan Comments Continue per POC  -SP       User Key  (r) = Recorded By, (t) = Taken By, (c) = Cosigned By    Initials Name Provider Type    ARLINE Gonzalez, PT Physical Therapist                Modalities       18 1600          Moist Heat    MH Applied Yes  -SP      Location (B) L/S area  -SP      Rx  Minutes 12 mins  -SP      MH Prior to Rx Yes  -SP      Ultrasound 68426    Location (B) L/S area  -SP      Rx Minutes 8 min  -SP      Duty Cycle 100  -SP      Frequency 1.0 MHz  -SP      Intensity - Wts/cm 1.5  -SP        User Key  (r) = Recorded By, (t) = Taken By, (c) = Cosigned By    Initials Name Provider Type    ARLINE Gonzalez, PT Physical Therapist                Exercises       01/04/18 1600          Subjective Comments    Subjective Comments Patient states that she is feeling better today.  She has been very active but is noticing less low back area pain.  She reports that she notices discomfort with sitting on couch and leaning forward to coffee table to reach and tag objects.  Discussed options to improve posture with this task to avoid forward flexion and rotation.    -SP      Exercise 1    Exercise Name 1 LTR  -SP      Reps 1 10  -SP      Time (Seconds) 1 5  -SP      Exercise 2    Exercise Name 2 SKTC  -SP      Reps 2 3  -SP      Time (Seconds) 2 20  -SP      Exercise 3    Exercise Name 3 piriformis stretch  -SP      Reps 3 3  -SP      Time (Seconds) 3 20  -SP      Exercise 4    Exercise Name 4 PPT  -SP      Reps 4 15  -SP      Time (Seconds) 4 5  -SP      Exercise 5    Exercise Name 5 PPT with ball squeeze  -SP      Reps 5 15  -SP      Time (Seconds) 5 5  -SP      Exercise 6    Exercise Name 6 PPT with BKFO  -SP      Reps 6 20  -SP      Exercise 7    Exercise Name 7 PPT with ball rolls  -SP      Reps 7 15  -SP        User Key  (r) = Recorded By, (t) = Taken By, (c) = Cosigned By    Initials Name Provider Type    ARLINE Gonzalez, PT Physical Therapist                        Manual Rx (last 36 hours)      Manual Treatments       01/04/18 1700          Manual Rx 1    Manual Rx 1 Location pelvis  -SP      Manual Rx 1 Type MET: shotgun and right anterior innominate correct  -SP        User Key  (r) = Recorded By, (t) = Taken By, (c) = Cosigned By    Initials Name Provider Type    ARLIEN  Priscila Gonzalez, PT Physical Therapist                             Time Calculation:   Start Time: 1605  Stop Time: 1725  Time Calculation (min): 80 min    Therapy Charges for Today     Code Description Service Date Service Provider Modifiers Qty    41743245363 HC PT HOT OR COLD PACK TREAT MCARE 1/4/2018 Priscila Gonzalez, PT GP 1    91518160645 HC PT THER PROC EA 15 MIN 1/4/2018 Priscila Gonzalez, PT GP 1                    Priscila Gonzalez, PT  1/4/2018

## 2018-01-09 ENCOUNTER — HOSPITAL ENCOUNTER (OUTPATIENT)
Dept: PHYSICAL THERAPY | Facility: HOSPITAL | Age: 63
Setting detail: THERAPIES SERIES
Discharge: HOME | End: 2018-01-09

## 2018-01-09 DIAGNOSIS — M54.30 SCIATICA, UNSPECIFIED LATERALITY: Primary | ICD-10-CM

## 2018-01-09 PROCEDURE — 97110 THERAPEUTIC EXERCISES: CPT | Performed by: PHYSICAL THERAPIST

## 2018-01-09 NOTE — THERAPY TREATMENT NOTE
Outpatient Physical Therapy Ortho Treatment Note  GERRY Dayna Cheek     Patient Name: Celina Barry  : 1955  MRN: [id number]  Today's Date: 2018      Visit Date: 2018    Visit Dx:    ICD-10-CM ICD-9-CM   1. Sciatica, unspecified laterality M54.30 724.3       Patient Active Problem List   Diagnosis   • Urinary frequency   • Acute midline low back pain without sciatica        Past Medical History:   Diagnosis Date   • Osteoarthritis         Past Surgical History:   Procedure Laterality Date   • SHOULDER SURGERY Right              PT Ortho       18 1605    Subjective Comments    Subjective Comments Patient reports that she moved furniture and had to work today.  States that she is having increased low back pain the extends into groin area today.  -SP      User Key  (r) = Recorded By, (t) = Taken By, (c) = Cosigned By    Initials Name Provider Type    ARLINE Gonzalez, PT Physical Therapist                            PT Assessment/Plan       18 1711       PT Assessment    Assessment Comments Patient with increased symptoms due to recent increased activity.  Decreased complaints of pain and improved posture observed after Rx today  -SP     PT Plan    PT Plan Comments Continue per POC  -SP       User Key  (r) = Recorded By, (t) = Taken By, (c) = Cosigned By    Initials Name Provider Type    ARLINE Gonzalez, PT Physical Therapist                Modalities       18 1605          Moist Heat    MH Applied Yes  -SP      Location (B) L/S area  -SP      Rx Minutes 12 mins  -SP      MH Prior to Rx Yes  -SP      Ultrasound 41977    Location (B) L/S area  -SP      Rx Minutes 8 min  -SP      Duty Cycle 100  -SP      Frequency 1.0 MHz  -SP      Intensity - Wts/cm 1.5  -SP        User Key  (r) = Recorded By, (t) = Taken By, (c) = Cosigned By    Initials Name Provider Type    ARLINE Gonzalez, PT Physical Therapist                Exercises       18 1605           Subjective Comments    Subjective Comments Patient reports that she moved furniture and had to work today.  States that she is having increased low back pain the extends into groin area today.  -SP      Exercise 1    Exercise Name 1 LTR  -SP      Reps 1 10  -SP      Time (Seconds) 1 5  -SP      Exercise 2    Exercise Name 2 SKTC  -SP      Reps 2 3  -SP      Time (Seconds) 2 20  -SP      Exercise 3    Exercise Name 3 piriformis stretch  -SP      Reps 3 3  -SP      Time (Seconds) 3 20  -SP      Exercise 4    Exercise Name 4 PPT  -SP      Reps 4 15  -SP      Time (Seconds) 4 5  -SP      Exercise 5    Exercise Name 5 PPT with ball squeeze  -SP      Reps 5 15  -SP      Time (Seconds) 5 5  -SP      Exercise 6    Exercise Name 6 PPT with BKFO  -SP      Reps 6 20  -SP      Exercise 7    Exercise Name 7 PPT with ball rolls  -SP      Reps 7 15  -SP        User Key  (r) = Recorded By, (t) = Taken By, (c) = Cosigned By    Initials Name Provider Type    ARLINE Gonzalez PT Physical Therapist                        Manual Rx (last 36 hours)      Manual Treatments       01/09/18 1605          Manual Rx 1    Manual Rx 1 Location pelvis  -SP      Manual Rx 1 Type MET: shotgun and right anterior innominate correct  -SP        User Key  (r) = Recorded By, (t) = Taken By, (c) = Cosigned By    Initials Name Provider Type    ARLINE Gonzalez PT Physical Therapist                             Time Calculation:   Start Time: 1605  Stop Time: 1715  Time Calculation (min): 70 min    Therapy Charges for Today     Code Description Service Date Service Provider Modifiers Qty    74614046015 HC PT HOT OR COLD PACK TREAT MCARE 1/9/2018 Priscila Gonzalez, PT GP 1    32833835188 HC PT THER PROC EA 15 MIN 1/9/2018 Priscila Gonzalez, PT GP 1                    Priscila Gonzalez PT  1/9/2018

## 2018-01-11 ENCOUNTER — HOSPITAL ENCOUNTER (OUTPATIENT)
Dept: PHYSICAL THERAPY | Facility: HOSPITAL | Age: 63
Setting detail: THERAPIES SERIES
Discharge: HOME | End: 2018-01-11

## 2018-01-11 DIAGNOSIS — M54.30 SCIATICA, UNSPECIFIED LATERALITY: Primary | ICD-10-CM

## 2018-01-11 PROCEDURE — G0283 ELEC STIM OTHER THAN WOUND: HCPCS | Performed by: PHYSICAL THERAPIST

## 2018-01-11 PROCEDURE — 97110 THERAPEUTIC EXERCISES: CPT | Performed by: PHYSICAL THERAPIST

## 2018-01-11 NOTE — THERAPY TREATMENT NOTE
Outpatient Physical Therapy Ortho Treatment Note  [place]     Patient Name: Celina Barry  : 1955  MRN: [id number]  Today's Date: 2018      Visit Date: 2018    Visit Dx:    ICD-10-CM ICD-9-CM   1. Sciatica, unspecified laterality M54.30 724.3       Patient Active Problem List   Diagnosis   • Urinary frequency   • Acute midline low back pain without sciatica        Past Medical History:   Diagnosis Date   • Osteoarthritis         Past Surgical History:   Procedure Laterality Date   • SHOULDER SURGERY Right              PT Ortho       18 1610    Subjective Comments    Subjective Comments Patient reports that she has been more sore over the last couple of days but cannot relate to any change in activity  -SP      18 1605    Subjective Comments    Subjective Comments Patient reports that she moved furniture and had to work today.  States that she is having increased low back pain the extends into groin area today.  -SP      User Key  (r) = Recorded By, (t) = Taken By, (c) = Cosigned By    Initials Name Provider Type    ARLINE Gonzalez, PT Physical Therapist                            PT Assessment/Plan       18 1725       PT Assessment    Assessment Comments Patient continues to have fluctuating symptoms but relates overall improvement in symptoms  -SP     PT Plan    PT Plan Comments Continue per POC  -SP       User Key  (r) = Recorded By, (t) = Taken By, (c) = Cosigned By    Initials Name Provider Type    ARLINE Gonzalez, PT Physical Therapist                Modalities       18 1610          Moist Heat    MH Applied Yes  -SP      Location (B) L/S area  -SP      Rx Minutes 12 mins  -SP      MH Prior to Rx Yes  -SP      Ice    Ice Applied Yes   with IFC  -SP      Ultrasound [id number]    Location --  -SP      Rx Minutes --  -SP      Duty Cycle --  -SP      Frequency --  -SP      Intensity - Wts/cm --  -SP      ELECTRICAL STIMULATION    Attended/Unattended  Unattended  -SP      Stimulation Type IFC  -SP      Location/Electrode Placement/Other (B) L/S area  -SP        User Key  (r) = Recorded By, (t) = Taken By, (c) = Cosigned By    Initials Name Provider Type    ARLINE Gonzalez, PT Physical Therapist                Exercises       01/11/18 1610          Subjective Comments    Subjective Comments Patient reports that she has been more sore over the last couple of days but cannot relate to any change in activity  -SP      Exercise 1    Exercise Name 1 LTR  -SP      Reps 1 10  -SP      Time (Seconds) 1 5  -SP      Exercise 2    Exercise Name 2 SKTC  -SP      Reps 2 3  -SP      Time (Seconds) 2 20  -SP      Exercise 3    Exercise Name 3 piriformis stretch  -SP      Reps 3 3  -SP      Time (Seconds) 3 20  -SP      Exercise 4    Exercise Name 4 PPT  -SP      Reps 4 15  -SP      Time (Seconds) 4 5  -SP      Exercise 5    Exercise Name 5 PPT with ball squeeze  -SP      Reps 5 15  -SP      Time (Seconds) 5 5  -SP      Exercise 6    Exercise Name 6 PPT with BKFO  -SP      Reps 6 20  -SP      Exercise 7    Exercise Name 7 PPT with ball rolls  -SP      Reps 7 15  -SP        User Key  (r) = Recorded By, (t) = Taken By, (c) = Cosigned By    Initials Name Provider Type    ARLINE Gonzalez, PT Physical Therapist                        Manual Rx (last 36 hours)      Manual Treatments       01/11/18 1610          Manual Rx 1    Manual Rx 1 Location pelvis  -SP      Manual Rx 1 Type MET: shotgun and right anterior innominate correct  -SP        User Key  (r) = Recorded By, (t) = Taken By, (c) = Cosigned By    Initials Name Provider Type    ARLINE Gonzalez PT Physical Therapist                             Time Calculation:   Start Time: 1610  Stop Time: 1726  Time Calculation (min): 76 min    Therapy Charges for Today     Code Description Service Date Service Provider Modifiers Qty    02316269270  PT ELECTRICAL STIM UNATTENDED 1/11/2018 Priscila Thurman  Carlos, PT  1    28601154641 HC PT HOT OR COLD PACK TREAT MCARE 1/11/2018 Priscila Gonzalez, PT GP 1    41139555370 HC PT THER PROC EA 15 MIN 1/11/2018 Priscila Gonzalez, PT GP 1                    Priscila Gonzalez, PT  1/11/2018

## 2018-01-16 ENCOUNTER — APPOINTMENT (OUTPATIENT)
Dept: PHYSICAL THERAPY | Facility: HOSPITAL | Age: 63
End: 2018-01-16

## 2018-01-18 ENCOUNTER — APPOINTMENT (OUTPATIENT)
Dept: PHYSICAL THERAPY | Facility: HOSPITAL | Age: 63
End: 2018-01-18

## 2018-01-23 ENCOUNTER — APPOINTMENT (OUTPATIENT)
Dept: PHYSICAL THERAPY | Facility: HOSPITAL | Age: 63
End: 2018-01-23

## 2018-01-25 ENCOUNTER — APPOINTMENT (OUTPATIENT)
Dept: PHYSICAL THERAPY | Facility: HOSPITAL | Age: 63
End: 2018-01-25

## 2018-01-30 ENCOUNTER — HOSPITAL ENCOUNTER (OUTPATIENT)
Dept: PHYSICAL THERAPY | Facility: HOSPITAL | Age: 63
Setting detail: THERAPIES SERIES
Discharge: HOME | End: 2018-01-30

## 2018-01-30 DIAGNOSIS — M54.30 SCIATICA, UNSPECIFIED LATERALITY: Primary | ICD-10-CM

## 2018-01-30 PROCEDURE — G8979 MOBILITY GOAL STATUS: HCPCS | Performed by: PHYSICAL THERAPIST

## 2018-01-30 PROCEDURE — 97110 THERAPEUTIC EXERCISES: CPT | Performed by: PHYSICAL THERAPIST

## 2018-01-30 PROCEDURE — G8978 MOBILITY CURRENT STATUS: HCPCS | Performed by: PHYSICAL THERAPIST

## 2018-01-30 PROCEDURE — G0283 ELEC STIM OTHER THAN WOUND: HCPCS | Performed by: PHYSICAL THERAPIST

## 2018-01-30 NOTE — THERAPY RE-EVALUATION
Outpatient Physical Therapy Ortho Re-Evaluation  [facility]     Patient Name: Celina Barry  : 1955  MRN: [id number]  Today's Date: 2018      Visit Date: 2018    Patient Active Problem List   Diagnosis   • Urinary frequency   • Acute midline low back pain without sciatica        Past Medical History:   Diagnosis Date   • Osteoarthritis         Past Surgical History:   Procedure Laterality Date   • SHOULDER SURGERY Right        Visit Dx:     ICD-10-CM ICD-9-CM   1. Sciatica, unspecified laterality M54.30 724.3                 PT Ortho       18 1610    Subjective Comments    Subjective Comments Patient reports that she has been unable to attend therapy due to illness.  She reports she has not been able to do much activity due to feeling bad and her back seems a little better.  However, she is having increase left lateral leg pain and occasional numbness in lower leg.  Patient reports that she continues to have increased pain with prolonged standing and weight bearing exercises, but is better able to tolerate driving car  -SP    Posture/Observations    Forward Head Mild  -SP    Cervical Lordosis Decreased  -SP    Thoracic Kyphosis Mild  -SP    Lumbar lordosis Increased  -SP    Iliac crests Bilateral:;Normal  -SP    Genu valgus Bilateral:;Increased  -SP    Posture/Observations Comments presents with apparent right anterior innominate  -SP    Neural Tension Signs- Lower Quarter Clearing    SLR Bilateral:;Negative  -SP    Sensory Screen for Light Touch- Lower Quarter Clearing    L2 (anterior mid thigh) Left:;Diminished  -SP    L5 (lateral lower leg/great toe) Right:;Diminished  -SP    Myotomal Screen- Lower Quarter Clearing    Hip flexion (L2) Bilateral:;4- (Good -)  -SP    Knee extension (L3) Bilateral:;4- (Good -);4 (Good)  -SP    Ankle DF (L4) 4+ (Good +);Bilateral:  -SP    Great toe extension (L5) Bilateral:;4+ (Good +)  -SP    Ankle PF (S1) 4- (Good -);Right:;Left:;4 (Good)  -SP    Knee  flexion (S2) Right:;3+ (Fair +);Left:;4- (Good -)  -SP    Lumbosacral Palpation    SI Bilateral:;Tender  -SP    Erector Spinae (Paraspinals) Bilateral:;Tender;Guarded/taut  -SP    Lumbar/SI Special Tests    SLR (Neural Tension) Bilateral:;Negative  -SP    Trunk    Flexion AROM Deficit decreased by 50% from full range  -SP    Extension AROM Deficit decreased by 50% from full range  -SP    Lt Lat Flexion AROM Deficit decreased by 25% from full range  -SP    Right Lateral Flexion AROM Deficit decreased by 25% from full range  -SP    Lt Rotation AROM Deficit decreased by 25% from full range  -SP    Right Rotation AROM Deficit decreased by 25% from full range  -SP    Trunk    Trunk Flexion Gross Movement (3-/5) fair minus  -SP    Left Trunk Rotat Int & Ext Abd Obliques (2/5) poor  -SP    Right Trunk Rotat Int & Ext Abd Obliques (2/5) poor  -SP    Flexibility    Flexibility Tested? Lower Extremity  -SP    Lower Extremity Flexibility    Hamstrings Bilateral:;Moderately limited  -SP    Hip Flexors Bilateral:;Mildly limited  -SP    Quadriceps Bilateral:;Mildly limited  -SP    Hip External Rotators Bilateral:;Moderately limited  -SP    Hip Internal Rotators Bilateral:;Moderately limited  -SP    Gastrocnemius Bilateral:;Moderately limited  -SP    Transfers    Transfers, Sit-Stand [place] independent  -SP    Gait Assessment/Treatment    Gait, [place] Level independent  -SP    Gait, Gait Pattern Analysis swing-through gait  -SP    Gait, Gait Deviations chloé decreased;forward flexed posture;step length decreased  -SP      User Key  (r) = Recorded By, (t) = Taken By, (c) = Cosigned By    Initials Name Provider Type     Priscila Gonzalez, PT Physical Therapist                      Therapy Education  Given: HEP, Posture/body mechanics  Program: Reinforced  How Provided: Verbal  Provided to: Patient  Level of Understanding: Verbalized           PT OP Goals       01/30/18 1610       PT Short Term Goals    STG 1  Patient demonstrates trunk AROM to wfl without complaints of pain at end ranges  -SP     STG 1 Progress Partially Met;Progressing  -SP     STG 1 Progress Comments demonstrates some improvement in trunk AROM but continues to have discomfort at end ranges  -SP     STG 2 Patient reports decreased radicular type symptoms into (B) LEs by 25%  -SP     STG 2 Progress Ongoing  -SP     STG 2 Progress Comments continued radiating symptoms to left LE now to lower leg level  -SP     STG 3 Patient reports she is able to tolerate sit or ride/drive car for 30 min with pain level <3/10 at worst  -SP     STG 3 Progress Ongoing  -SP     Long Term Goals    LTG 1 Patient reports improved ability to drive or sit for >1 hour with pain level <2/10  -SP     LTG 1 Progress Ongoing  -SP     LTG 2 Patient demonstrates increased trunk and LE strength by one muscle grade to better tolerate ADLs  -SP     LTG 2 Progress Progressing;Ongoing  -SP     LTG 3 Patient independent with HEP  -SP     LTG 3 Progress Ongoing;Progressing  -SP       User Key  (r) = Recorded By, (t) = Taken By, (c) = Cosigned By    Initials Name Provider Type    SP Priscila Gonzalez, PT Physical Therapist                PT Assessment/Plan       01/30/18 1828 01/30/18 1819    PT Assessment    Assessment Comments  Patient has been ill and unable to attend scheduled visits.  She returns for treatment today.  Patient has made progress toward goals but continues to have low back discomfort and increased radicular type symptoms.  Patient continues to exhibit trunk weakness and pelvic obliquity with pain that limits her tolerance for prologned positions and weight bearing activity.  -SP    PT Plan    PT Frequency  2x/week  -SP    Predicted Duration of Therapy Intervention (days/wks) 4 weeks  -SP 4  -SP    PT Plan Comments Resume stretching program and progress trunk stabilization exercises.  Continue per POC  -SP Continue per POC  -SP      User Key  (r) = Recorded By, (t) =  Taken By, (c) = Cosigned By    Initials Name Provider Type    ARLINE Gonzalez, PT Physical Therapist                Modalities       01/30/18 1610          Moist Heat    MH Applied Yes  -SP      Location (B) L/S area  -SP      Rx Minutes 12 mins  -SP      MH Prior to Rx Yes  -SP      ELECTRICAL STIMULATION    Attended/Unattended Unattended  -SP      Stimulation Type IFC  -SP      Location/Electrode Placement/Other (B) L/S area  -SP        User Key  (r) = Recorded By, (t) = Taken By, (c) = Cosigned By    Initials Name Provider Type    ARLINE Gonzalez, PT Physical Therapist              Exercises       01/30/18 1610          Subjective Comments    Subjective Comments Patient reports that she has been unable to attend therapy due to illness.  She reports she has not been able to do much activity due to feeling bad and her back seems a little better.  However, she is having increase left lateral leg pain and occasional numbness in lower leg.  Patient reports that she continues to have increased pain with prolonged standing and weight bearing exercises, but is better able to tolerate driving car  -SP      Exercise 1    Exercise Name 1 LTR  -SP      Reps 1 10  -SP      Time (Seconds) 1 5  -SP      Exercise 2    Exercise Name 2 SKTC  -SP      Reps 2 3  -SP      Time (Seconds) 2 20  -SP      Exercise 3    Exercise Name 3 piriformis stretch  -SP      Reps 3 3  -SP      Time (Seconds) 3 20  -SP      Exercise 4    Exercise Name 4 PPT  -SP      Reps 4 15  -SP      Time (Seconds) 4 5  -SP      Exercise 5    Exercise Name 5 PPT with ball squeeze  -SP      Reps 5 15  -SP      Time (Seconds) 5 5  -SP      Exercise 6    Exercise Name 6 PPT with BKFO  -SP      Reps 6 20  -SP      Exercise 7    Exercise Name 7 PPT with ball rolls  -SP      Reps 7 15  -SP        User Key  (r) = Recorded By, (t) = Taken By, (c) = Cosigned By    Initials Name Provider Type    ARLINE Gonzalez PT Physical Therapist            Manual Rx (last 36 hours)      Manual Treatments       01/30/18 1610          Manual Rx 1    Manual Rx 1 Location pelvis  -SP      Manual Rx 1 Type MET: shotgun and right anterior innominate correct  -SP        User Key  (r) = Recorded By, (t) = Taken By, (c) = Cosigned By    Initials Name Provider Type    SP Priscila Gonzalez, PT Physical Therapist                                Time Calculation:   Start Time: 1610  Stop Time: 1740  Time Calculation (min): 90 min     Therapy Charges for Today     Code Description Service Date Service Provider Modifiers Qty    63120568311 HC PT MOBILITY CURRENT 1/30/2018 Priscila Gonzalez, PT GP, CK 1    24457412521 HC PT MOBILITY PROJECTED 1/30/2018 Priscila Gonzalez, PT GP, CI 1    82825137876 HC PT ELECTRICAL STIM UNATTENDED 1/30/2018 Priscila Gonzalez, PT  1    13152194942 HC PT HOT OR COLD PACK TREAT [facility]ARE 1/30/2018 Priscila Gonzalez, PT GP 1    00199647135 HC PT THER PROC EA 15 MIN 1/30/2018 Priscila Gonzalez, PT GP 1          PT G-Codes  Mobility: Walking and Moving Around Current Status (): At least 40 percent but less than 60 percent impaired, limited or restricted  Mobility: Walking and Moving Around Goal Status (): At least 1 percent but less than 20 percent impaired, limited or restricted         Priscila Gonzalez, PT  1/30/2018

## 2018-02-01 ENCOUNTER — HOSPITAL ENCOUNTER (OUTPATIENT)
Dept: PHYSICAL THERAPY | Facility: HOSPITAL | Age: 63
Setting detail: THERAPIES SERIES
Discharge: HOME | End: 2018-02-01

## 2018-02-01 DIAGNOSIS — M54.30 SCIATICA, UNSPECIFIED LATERALITY: Primary | ICD-10-CM

## 2018-02-01 PROCEDURE — G0283 ELEC STIM OTHER THAN WOUND: HCPCS | Performed by: PHYSICAL THERAPIST

## 2018-02-01 PROCEDURE — 97110 THERAPEUTIC EXERCISES: CPT | Performed by: PHYSICAL THERAPIST

## 2018-02-01 NOTE — THERAPY TREATMENT NOTE
Outpatient Physical Therapy Ortho Treatment Note  [facility]     Patient Name: Celina Barry  : 1955  MRN: [id number]  Today's Date: 2018      Visit Date: 2018    Visit Dx:    ICD-10-CM ICD-9-CM   1. Sciatica, unspecified laterality M54.30 724.3       Patient Active Problem List   Diagnosis   • Urinary frequency   • Acute midline low back pain without sciatica        Past Medical History:   Diagnosis Date   • Osteoarthritis         Past Surgical History:   Procedure Laterality Date   • SHOULDER SURGERY Right              PT Ortho       18 1530    Subjective Comments    Subjective Comments Patient reports that she felt much better after last visit but last night she tried to move a large sewing machine and felt that it hurt her back. She reports that she had difficulty getting out of bed this morning and her back still feels very stiff  -SP      18 1610    Subjective Comments    Subjective Comments Patient reports that she has been unable to attend therapy due to illness.  She reports she has not been able to do much activity due to feeling bad and her back seems a little better.  However, she is having increase left lateral leg pain and occasional numbness in lower leg.  Patient reports that she continues to have increased pain with prolonged standing and weight bearing exercises, but is better able to tolerate driving car  -SP    Posture/Observations    Forward Head Mild  -SP    Cervical Lordosis Decreased  -SP    Thoracic Kyphosis Mild  -SP    Lumbar lordosis Increased  -SP    Iliac crests Bilateral:;Normal  -SP    Genu valgus Bilateral:;Increased  -SP    Posture/Observations Comments presents with apparent right anterior innominate  -SP    Neural Tension Signs- Lower Quarter Clearing    SLR Bilateral:;Negative  -SP    Sensory Screen for Light Touch- Lower Quarter Clearing    L2 (anterior mid thigh) Left:;Diminished  -SP    L5 (lateral lower leg/great toe) Right:;Diminished  -SP     Myotomal Screen- Lower Quarter Clearing    Hip flexion (L2) Bilateral:;4- (Good -)  -SP    Knee extension (L3) Bilateral:;4- (Good -);4 (Good)  -SP    Ankle DF (L4) 4+ (Good +);Bilateral:  -SP    Great toe extension (L5) Bilateral:;4+ (Good +)  -SP    Ankle PF (S1) 4- (Good -);Right:;Left:;4 (Good)  -SP    Knee flexion (S2) Right:;3+ (Fair +);Left:;4- (Good -)  -SP    Lumbosacral Palpation    SI Bilateral:;Tender  -SP    Erector Spinae (Paraspinals) Bilateral:;Tender;Guarded/taut  -SP    Lumbar/SI Special Tests    SLR (Neural Tension) Bilateral:;Negative  -SP    Trunk    Flexion AROM Deficit decreased by 50% from full range  -SP    Extension AROM Deficit decreased by 50% from full range  -SP    Lt Lat Flexion AROM Deficit decreased by 25% from full range  -SP    Right Lateral Flexion AROM Deficit decreased by 25% from full range  -SP    Lt Rotation AROM Deficit decreased by 25% from full range  -SP    Right Rotation AROM Deficit decreased by 25% from full range  -SP    Trunk    Trunk Flexion Gross Movement (3-/5) fair minus  -SP    Left Trunk Rotat Int & Ext Abd Obliques (2/5) poor  -SP    Right Trunk Rotat Int & Ext Abd Obliques (2/5) poor  -SP    Flexibility    Flexibility Tested? Lower Extremity  -SP    Lower Extremity Flexibility    Hamstrings Bilateral:;Moderately limited  -SP    Hip Flexors Bilateral:;Mildly limited  -SP    Quadriceps Bilateral:;Mildly limited  -SP    Hip External Rotators Bilateral:;Moderately limited  -SP    Hip Internal Rotators Bilateral:;Moderately limited  -SP    Gastrocnemius Bilateral:;Moderately limited  -SP    Transfers    Transfers, Sit-Stand [place] independent  -SP    Gait Assessment/Treatment    Gait, [place] Level independent  -SP    Gait, Gait Pattern Analysis swing-through gait  -SP    Gait, Gait Deviations chloé decreased;forward flexed posture;step length decreased  -SP      User Key  (r) = Recorded By, (t) = Taken By, (c) = Cosigned By    Initials Name Provider  "Type    SP Priscila Gonzalez, PT Physical Therapist                            PT Assessment/Plan       02/01/18 1813 02/01/18 1812    PT Assessment    Assessment Comments  Patient with significant pelvic obliquity that corrects well with MET.  Trial of kinesiotape to right SI joint  -SP    PT Plan    PT Plan Comments Continue per POC  -SP       User Key  (r) = Recorded By, (t) = Taken By, (c) = Cosigned By    Initials Name Provider Type    ARLINE Gonzalez, PT Physical Therapist                Modalities       02/01/18 1530          Moist Heat    MH Applied Yes  -SP      Location (B) L/S area  -SP      Rx Minutes 12 mins  -SP      MH Prior to Rx Yes  -SP      ELECTRICAL STIMULATION    Attended/Unattended Unattended  -SP      Stimulation Type IFC  -SP      Location/Electrode Placement/Other (B) L/S area  -SP      Other Treatment Provided    Taping / Bracing kinesiotape: 3 \"I\" strips in x pattern space correct over right SI joint  -SP        User Key  (r) = Recorded By, (t) = Taken By, (c) = Cosigned By    Initials Name Provider Type    SP Priscila Gonzalez, PT Physical Therapist                Exercises       02/01/18 1530          Subjective Comments    Subjective Comments Patient reports that she felt much better after last visit but last night she tried to move a large sewing machine and felt that it hurt her back. She reports that she had difficulty getting out of bed this morning and her back still feels very stiff  -SP      Exercise 1    Exercise Name 1 LTR  -SP      Reps 1 10  -SP      Time (Seconds) 1 5  -SP      Exercise 2    Exercise Name 2 SKTC  -SP      Reps 2 3  -SP      Time (Seconds) 2 20  -SP      Exercise 3    Exercise Name 3 piriformis stretch  -SP      Reps 3 3  -SP      Time (Seconds) 3 20  -SP      Exercise 4    Exercise Name 4 PPT  -SP      Reps 4 15  -SP      Time (Seconds) 4 5  -SP      Exercise 5    Exercise Name 5 PPT with ball squeeze  -SP      Reps 5 15  -SP      " Time (Seconds) 5 5  -SP      Exercise 6    Exercise Name 6 PPT with BKFO  -SP      Reps 6 20  -SP      Exercise 7    Exercise Name 7 PPT with ball rolls  -SP      Reps 7 15  -SP        User Key  (r) = Recorded By, (t) = Taken By, (c) = Cosigned By    Initials Name Provider Type    SP Priscila Gonzalez PT Physical Therapist                        Manual Rx (last 36 hours)      Manual Treatments       02/01/18 1530          Manual Rx 1    Manual Rx 1 Location pelvis  -SP      Manual Rx 1 Type MET: shotgun and right anterior innominate correct  -SP        User Key  (r) = Recorded By, (t) = Taken By, (c) = Cosigned By    Initials Name Provider Type    ARLINE Gonzalez PT Physical Therapist                             Time Calculation:   Start Time: 1530  Stop Time: 1658  Time Calculation (min): 88 min    Therapy Charges for Today     Code Description Service Date Service Provider Modifiers Qty    65941438974 HC PT ELECTRICAL STIM UNATTENDED 2/1/2018 Priscila Gonzalez, PT  1    65943585270 HC PT HOT OR COLD PACK TREAT MCARE 2/1/2018 Priscila Gonzalez, PT GP 1    [id number] HC PT THER PROC EA 15 MIN 2/1/2018 Priscila Gonzalez, PT GP 1                    Priscila Gonzalez, PT  2/1/2018

## 2018-02-06 ENCOUNTER — HOSPITAL ENCOUNTER (OUTPATIENT)
Dept: PHYSICAL THERAPY | Facility: HOSPITAL | Age: 63
Setting detail: THERAPIES SERIES
Discharge: HOME | End: 2018-02-06

## 2018-02-06 DIAGNOSIS — M54.30 SCIATICA, UNSPECIFIED LATERALITY: Primary | ICD-10-CM

## 2018-02-06 PROCEDURE — 97110 THERAPEUTIC EXERCISES: CPT | Performed by: PHYSICAL THERAPIST

## 2018-02-06 PROCEDURE — G0283 ELEC STIM OTHER THAN WOUND: HCPCS | Performed by: PHYSICAL THERAPIST

## 2018-02-06 NOTE — THERAPY TREATMENT NOTE
Outpatient Physical Therapy Ortho Treatment Note   Day[facility]ek     Patient Name: Celina Barry  : 1955  MRN: [id number]  Today's Date: 2018      Visit Date: 2018    Visit Dx:    ICD-10-CM ICD-9-CM   1. Sciatica, unspecified laterality M54.30 724.3       Patient Active Problem List   Diagnosis   • Urinary frequency   • Acute midline low back pain without sciatica        Past Medical History:   Diagnosis Date   • Osteoarthritis         Past Surgical History:   Procedure Laterality Date   • SHOULDER SURGERY Right              PT Ortho       18 1605    Subjective Comments    Subjective Comments Patient reports that kinesiotape applied last visit was very helpful and she is feeling a lot better today  -SP      User Key  (r) = Recorded By, (t) = Taken By, (c) = Cosigned By    Initials Name Provider Type    ARLINE Gonzalez, PT Physical Therapist                            PT Assessment/Plan       18 1836 18 1835    PT Assessment    Assessment Comments  Patient with improved pelvic alignment today.  She tolerates progression of ther-ex well.  -SP    PT Plan    PT Plan Comments Continue 1-2 week then DC with HEP  -SP       User Key  (r) = Recorded By, (t) = Taken By, (c) = Cosigned By    Initials Name Provider Type    ARLINE Gonzalez, PT Physical Therapist                    Exercises       18 1605          Subjective Comments    Subjective Comments Patient reports that kinesiotape applied last visit was very helpful and she is feeling a lot better today  -SP      Exercise 1    Exercise Name 1 LTR  -SP      Reps 1 10  -SP      Time (Seconds) 1 5  -SP      Exercise 2    Exercise Name 2 SKTC  -SP      Reps 2 3  -SP      Time (Seconds) 2 20  -SP      Exercise 3    Exercise Name 3 piriformis stretch  -SP      Reps 3 3  -SP      Time (Seconds) 3 20  -SP      Exercise 4    Exercise Name 4 PPT  -SP      Reps 4 15  -SP      Time (Seconds) 4 5  -SP      Exercise 5     Exercise Name 5 PPT with ball squeeze  -SP      Reps 5 15  -SP      Time (Seconds) 5 5  -SP      Exercise 6    Exercise Name 6 PPT with BKFO  -SP      Reps 6 20  -SP      Exercise 7    Exercise Name 7 PPT with ball rolls  -SP      Reps 7 15  -SP      Exercise 8    Exercise Name 8 PPT vs wall  -SP      Reps 8 10  -SP      Time (Seconds) 8 5  -SP        User Key  (r) = Recorded By, (t) = Taken By, (c) = Cosigned By    Initials Name Provider Type    ARLINE Gonzalez PT Physical Therapist                        Manual Rx (last 36 hours)      Manual Treatments       02/06/18 1605          Manual Rx 1    Manual Rx 1 Location pelvis  -SP      Manual Rx 1 Type MET: shotgun and right anterior innominate correct  -SP        User Key  (r) = Recorded By, (t) = Taken By, (c) = Cosigned By    Initials Name Provider Type    ARLINE Gonzalez PT Physical Therapist                             Time Calculation:   Start Time: 1605  Stop Time: 1720  Time Calculation (min): 75 min    Therapy Charges for Today     Code Description Service Date Service Provider Modifiers Qty    67203689421 HC PT ELECTRICAL STIM UNATTENDED 2/6/2018 Priscila Gonzalez, PT  1    42568282710 HC PT HOT OR COLD PACK TREAT MCARE 2/6/2018 Priscila Gonzalez, PT GP 1    [id number] HC PT THER PROC EA 15 MIN 2/6/2018 Priscila Gonzalez, PT GP 1                    Priscila Gonzalez, PT  2/6/2018

## 2018-02-08 ENCOUNTER — HOSPITAL ENCOUNTER (OUTPATIENT)
Dept: PHYSICAL THERAPY | Facility: HOSPITAL | Age: 63
Setting detail: THERAPIES SERIES
Discharge: HOME | End: 2018-02-08

## 2018-02-08 DIAGNOSIS — M54.30 SCIATICA, UNSPECIFIED LATERALITY: Primary | ICD-10-CM

## 2018-02-08 PROCEDURE — G0283 ELEC STIM OTHER THAN WOUND: HCPCS | Performed by: PHYSICAL THERAPIST

## 2018-02-08 PROCEDURE — 97110 THERAPEUTIC EXERCISES: CPT | Performed by: PHYSICAL THERAPIST

## 2018-02-08 NOTE — THERAPY TREATMENT NOTE
"    Outpatient Physical Therapy Ortho Treatment Note  GERRY Perez     Patient Name: Celina Barry  : 1955  MRN: [id number]  Today's Date: 2018      Visit Date: 2018    Visit Dx:    ICD-10-CM ICD-9-CM   1. Sciatica, unspecified laterality M54.30 724.3       Patient Active Problem List   Diagnosis   • Urinary frequency   • Acute midline low back pain without sciatica        Past Medical History:   Diagnosis Date   • Osteoarthritis         Past Surgical History:   Procedure Laterality Date   • SHOULDER SURGERY Right              PT Ortho       18 1555    Subjective Comments    Subjective Comments Patient reports that she is stressed today due to having to buy new tires for her car.  She feels like she is tensed up.    -SP      18 1605    Subjective Comments    Subjective Comments Patient reports that kinesiotape applied last visit was very helpful and she is feeling a lot better today  -SP      User Key  (r) = Recorded By, (t) = Taken By, (c) = Cosigned By    Initials Name Provider Type    ARLINE Gonzalez, PT Physical Therapist                            PT Assessment/Plan       18 9808       PT Assessment    Assessment Comments Patient with improved pelvic alignment observed.  Demonstrates good technique with PPT  -SP     PT Plan    PT Plan Comments Continue 1x week for 2 weeks then DC with HEP  -SP       User Key  (r) = Recorded By, (t) = Taken By, (c) = Cosigned By    Initials Name Provider Type    ARLINE Gonzalez, PT Physical Therapist                Modalities       18 1555          Moist Heat    MH Applied Yes  -SP      Location (B) L/S area  -SP      Rx Minutes 12 mins  -SP       Prior to Rx Yes  -SP      ELECTRICAL STIMULATION    Attended/Unattended Unattended  -SP      Stimulation Type IFC  -SP      Location/Electrode Placement/Other (B) L/S area  -SP      Other Treatment Provided    Taping / Bracing kinesiotape: 3 \"I\" strips in x pattern space " correct over right SI joint  -SP        User Key  (r) = Recorded By, (t) = Taken By, (c) = Cosigned By    Initials Name Provider Type    ARLINE Gonzalez, PT Physical Therapist                Exercises       02/08/18 1555          Subjective Comments    Subjective Comments Patient reports that she is stressed today due to having to buy new tires for her car.  She feels like she is tensed up.    -SP      Exercise 1    Exercise Name 1 LTR  -SP      Reps 1 10  -SP      Time (Seconds) 1 5  -SP      Exercise 2    Exercise Name 2 SKTC  -SP      Reps 2 3  -SP      Time (Seconds) 2 20  -SP      Exercise 3    Exercise Name 3 piriformis stretch  -SP      Reps 3 3  -SP      Time (Seconds) 3 20  -SP      Exercise 4    Exercise Name 4 PPT  -SP      Reps 4 15  -SP      Time (Seconds) 4 5  -SP      Exercise 5    Exercise Name 5 PPT with ball squeeze  -SP      Reps 5 15  -SP      Time (Seconds) 5 5  -SP      Exercise 6    Exercise Name 6 PPT with BKFO  -SP      Reps 6 20  -SP      Exercise 7    Exercise Name 7 PPT with ball rolls  -SP      Reps 7 15  -SP      Exercise 8    Exercise Name 8 PPT vs wall  -SP      Reps 8 10  -SP      Time (Seconds) 8 5  -SP        User Key  (r) = Recorded By, (t) = Taken By, (c) = Cosigned By    Initials Name Provider Type    ARLINE Gonzalez, PT Physical Therapist                        Manual Rx (last 36 hours)      Manual Treatments       02/08/18 1555          Manual Rx 1    Manual Rx 1 Location pelvis  -SP      Manual Rx 1 Type MET: shotgun and right anterior innominate correct  -SP        User Key  (r) = Recorded By, (t) = Taken By, (c) = Cosigned By    Initials Name Provider Type    ARLINE Gonzalez PT Physical Therapist                             Time Calculation:   Start Time: 1555  Stop Time: 1715  Time Calculation (min): 80 min    Therapy Charges for Today     Code Description Service Date Service Provider Modifiers Qty    60227808169  PT ELECTRICAL STIM  UNATTENDED 2/8/2018 Priscila Gonzalez, PT  1    00111938478 HC PT HOT OR COLD PACK TREAT MCARE 2/8/2018 Priscila Gonzalez, PT GP 1    76259415645 HC PT THER PROC EA 15 MIN 2/8/2018 Priscila Gonzalez, PT GP 1                    Priscila Gonzalez, PT  2/8/2018

## 2018-02-14 ENCOUNTER — HOSPITAL ENCOUNTER (OUTPATIENT)
Dept: PHYSICAL THERAPY | Facility: HOSPITAL | Age: 63
Setting detail: THERAPIES SERIES
Discharge: HOME | End: 2018-02-14

## 2018-02-14 DIAGNOSIS — M54.30 SCIATICA, UNSPECIFIED LATERALITY: Primary | ICD-10-CM

## 2018-02-14 PROCEDURE — G0283 ELEC STIM OTHER THAN WOUND: HCPCS | Performed by: PHYSICAL THERAPIST

## 2018-02-14 PROCEDURE — 97110 THERAPEUTIC EXERCISES: CPT | Performed by: PHYSICAL THERAPIST

## 2018-02-15 NOTE — THERAPY TREATMENT NOTE
"    Outpatient Physical Therapy Ortho Treatment Note  GERRY McintoshJericho     Patient Name: Celina Barry  : 1955  MRN: [id number]  Today's Date: 2/15/2018      Visit Date: 2018    Visit Dx:    ICD-10-CM ICD-9-CM   1. Sciatica, unspecified laterality M54.30 724.3       Patient Active Problem List   Diagnosis   • Urinary frequency   • Acute midline low back pain without sciatica        Past Medical History:   Diagnosis Date   • Osteoarthritis         Past Surgical History:   Procedure Laterality Date   • SHOULDER SURGERY Right              PT Ortho       18 0745    Subjective Comments    Subjective Comments Patient reports that she is having increased soreness in (B) arms and hips today.  Her back is not bothering her as much today.  Patient encouraged to speak with her physician regarding multiple body areas of pain.  -SP      User Key  (r) = Recorded By, (t) = Taken By, (c) = Cosigned By    Initials Name Provider Type    ARLINE Gonzalez, PT Physical Therapist                            PT Assessment/Plan       02/15/18 0748       PT Assessment    Assessment Comments Patient with multiple body area complaints of pain today.  Overall decreased complaints of pain in low back reported.    -SP     PT Plan    PT Plan Comments Continue one additional visit and finalize HEP  -SP       User Key  (r) = Recorded By, (t) = Taken By, (c) = Cosigned By    Initials Name Provider Type    ARLINE Gonzalez, PT Physical Therapist                Modalities       02/15/18 0745          Moist Heat    MH Applied Yes  -SP      Location (B) L/S area  -SP      Rx Minutes 12 mins  -SP      MH Prior to Rx Yes  -SP      ELECTRICAL STIMULATION    Attended/Unattended Unattended  -SP      Stimulation Type IFC  -SP      Location/Electrode Placement/Other (B) L/S area  -SP      Other Treatment Provided    Taping / Bracing kinesiotape: 3 \"I\" strips in x pattern space correct over right SI joint  -SP        User Key  (r) = " Recorded By, (t) = Taken By, (c) = Cosigned By    Initials Name Provider Type    ARLINE Gonzalez, PT Physical Therapist                Exercises       02/15/18 0745          Exercise 1    Exercise Name 1 LTR  -SP      Reps 1 10  -SP      Time (Seconds) 1 5  -SP      Exercise 2    Exercise Name 2 SKTC  -SP      Reps 2 3  -SP      Time (Seconds) 2 20  -SP      Exercise 3    Exercise Name 3 piriformis stretch  -SP      Reps 3 3  -SP      Time (Seconds) 3 20  -SP      Exercise 4    Exercise Name 4 PPT  -SP      Reps 4 15  -SP      Time (Seconds) 4 5  -SP      Exercise 5    Exercise Name 5 PPT with ball squeeze  -SP      Reps 5 15  -SP      Time (Seconds) 5 5  -SP      Exercise 6    Exercise Name 6 PPT with BKFO  -SP      Reps 6 20  -SP      Exercise 7    Exercise Name 7 PPT with ball rolls  -SP      Reps 7 15  -SP      Exercise 8    Exercise Name 8 PPT vs wall  -SP      Reps 8 10  -SP      Time (Seconds) 8 5  -SP        User Key  (r) = Recorded By, (t) = Taken By, (c) = Cosigned By    Initials Name Provider Type    ARLINE Gonzalez, PT Physical Therapist                        Manual Rx (last 36 hours)      Manual Treatments       02/14/18 0747          Manual Rx 1    Manual Rx 1 Location pelvis  -SP      Manual Rx 1 Type MET: shotgun and right anterior innominate correct  -SP        User Key  (r) = Recorded By, (t) = Taken By, (c) = Cosigned By    Initials Name Provider Type    ARLINE Gonzalez, VILMA Physical Therapist                             Time Calculation:   Start Time: 1620  Stop Time: 1745  Time Calculation (min): 85 min    Therapy Charges for Today     Code Description Service Date Service Provider Modifiers Qty    64063577047 HC PT HOT OR COLD PACK TREAT MCARE 2/14/2018 Priscila Gonzalez, PT GP 1    78269091649 HC PT ELECTRICAL STIM UNATTENDED 2/14/2018 Priscila Gonzalez, PT  1    36195212071 HC PT THER PROC EA 15 MIN 2/14/2018 Priscila Gonzalez, PT GP 1                     Priscila Gonzalez, PT  2/15/2018

## 2018-02-21 ENCOUNTER — APPOINTMENT (OUTPATIENT)
Dept: PHYSICAL THERAPY | Facility: HOSPITAL | Age: 63
End: 2018-02-21

## 2018-07-11 ENCOUNTER — DOCUMENTATION (OUTPATIENT)
Dept: PHYSICAL THERAPY | Facility: HOSPITAL | Age: 63
End: 2018-07-11

## 2018-10-20 ENCOUNTER — OFFICE VISIT (OUTPATIENT)
Dept: RETAIL CLINIC | Facility: CLINIC | Age: 63
End: 2018-10-20

## 2018-10-20 VITALS
HEART RATE: 101 BPM | RESPIRATION RATE: 16 BRPM | DIASTOLIC BLOOD PRESSURE: 80 MMHG | SYSTOLIC BLOOD PRESSURE: 112 MMHG | OXYGEN SATURATION: 99 % | TEMPERATURE: 96.6 F

## 2018-10-20 DIAGNOSIS — J20.8 ACUTE BRONCHITIS DUE TO OTHER SPECIFIED ORGANISMS: ICD-10-CM

## 2018-10-20 DIAGNOSIS — J01.00 ACUTE MAXILLARY SINUSITIS, RECURRENCE NOT SPECIFIED: Primary | ICD-10-CM

## 2018-10-20 PROCEDURE — 99214 OFFICE O/P EST MOD 30 MIN: CPT | Performed by: NURSE PRACTITIONER

## 2018-10-20 RX ORDER — ALBUTEROL SULFATE 90 UG/1
2 AEROSOL, METERED RESPIRATORY (INHALATION) EVERY 4 HOURS PRN
Qty: 1 INHALER | Refills: 0 | Status: SHIPPED | OUTPATIENT
Start: 2018-10-20

## 2018-10-20 RX ORDER — DEXTROMETHORPHAN HYDROBROMIDE AND PROMETHAZINE HYDROCHLORIDE 15; 6.25 MG/5ML; MG/5ML
5 SYRUP ORAL 4 TIMES DAILY PRN
Qty: 100 ML | Refills: 0 | Status: SHIPPED | OUTPATIENT
Start: 2018-10-20

## 2018-10-20 RX ORDER — BENZONATATE 100 MG/1
CAPSULE ORAL
Qty: 42 CAPSULE | Refills: 0 | Status: SHIPPED | OUTPATIENT
Start: 2018-10-20

## 2018-10-20 RX ORDER — AMOXICILLIN 875 MG/1
875 TABLET, COATED ORAL 2 TIMES DAILY
Qty: 20 TABLET | Refills: 0 | Status: SHIPPED | OUTPATIENT
Start: 2018-10-20 | End: 2018-10-30

## 2018-10-20 NOTE — PROGRESS NOTES
"[facility]    CC:   Chief Complaint   Patient presents with   • Sinus Problem     HPI   63 YOF presents c/o:   1 month bad smell, detergent smell in home  Metal taste in mouth all the time x 1 month  Moved cat litter box under table, has to scoop litter and believes fumes/glade smell getting in her nose  6 months ago and 6 days ago had dizziness/near syncopal sensation after drinking coffee with sugar-creamer in it. Resolved with eating soup/drinking water  1-2 weeks of severe sinus symptoms/headache/facial pain/subjective fevers/chills  6 days ago, when episode above happened, she had a panic attack due to the symptoms and continued/increasing:  sore throat/ear ache/sneezing/ear pain/headache/tight, incessant nonproductive cough causing SOA/thick mucus only very occasionally/fatigue/malaise  Taking epsom salt bath, vitamin D, cold shortening medications/zrtec/singulair/advil/tylenol  Now having rib pain, not sleeping with coughing so much to point of incontinence of urine  Then rib pain, no sleep at all, took mucinex then head/eyes painful/photophobic took advil x 8 pills  She notes high stress due to loss of  job and need to work and trying to find a job.   She is crying while talking about all the above.   She notes she believes the government is \"out to get us all for money\" and notes \"I will never have a flu shot or drink milk because the government is trying to hurt us and get money\"  She talks about her weight, her looks, weight loss, diabetes, eye color, and multiple other subjects throughout visit  No known sick contacts, no other medications tried    Review of Systems: Please see the above history of present illness for pertinent positives and negatives. The remainder of the patient's systems have been reviewed and are negative.     Past Medical History:   Diagnosis Date   • Allergic    • Osteoarthritis    • Sciatica        Past Surgical History:   Procedure Laterality Date   • " SHOULDER SURGERY Right    • SKIN CANCER EXCISION         Social History   Substance Use Topics   • Smoking status: Former Smoker     Quit date: 2001   • Smokeless tobacco: Never Used   • Alcohol use No     Current Outpatient Prescriptions:   •  cetirizine (ZYRTEC ALLERGY) 10 MG tablet, Take 10 mg by mouth Daily., Disp: , Rfl:   •  cyclobenzaprine (FLEXERIL) 10 MG tablet, Take 1 tablet by mouth 3 (Three) Times a Day As Needed for Muscle Spasms for up to 24 doses., Disp: 24 tablet, Rfl: 0  •  fluticasone (FLONASE) 50 MCG/ACT nasal spray, 2 sprays into each nostril Daily., Disp: , Rfl:   •  montelukast (SINGULAIR) 10 MG tablet, Take 10 mg by mouth Every Night., Disp: , Rfl:   •  traMADol (ULTRAM) 50 MG tablet, Take 1 tablet by mouth Every 6 (Six) Hours As Needed for Moderate Pain (4-6) for up to 24 doses., Disp: 24 tablet, Rfl: 0  •  diclofenac (VOLTAREN) 75 MG EC tablet, Take 1 tablet by mouth 2 (Two) Times a Day As Needed (Pain) for up to 20 doses., Disp: 20 tablet, Rfl: 0  •  diclofenac (VOLTAREN) 75 MG EC tablet, Take 1 tablet by mouth 2 (Two) Times a Day As Needed (Pain) for up to 20 doses., Disp: 20 tablet, Rfl: 0  •  traMADol (ULTRAM) 50 MG tablet, 1 - 2 tablets po q 6 hours PRN sever pain, Disp: 24 tablet, Rfl: 0    Allergies   Allergen Reactions   • Latex Rash       OBJECTIVE:    /80   Pulse 101   Temp 96.6 °F (35.9 °C) (Temporal Artery )   Resp 16   SpO2 99%     Lab Results (last 24 hours)     ** No results found for the last 24 hours. **          General Appearance:    Alert, cooperative, no distress, appears stated age, obese   Head:    Normocephalic, without obvious abnormality, atraumatic   Eyes:    PERRL, conjunctiva/corneas clear, EOM's intact, fundi     benign, both eyes   Ears:    Bilaterally cloudy TMs with effusions, no erythema, canals unremarkable   Nose:   Nares normal, septum midline, mucosa normal, no drainage     +maxillary/frontal moderate sinus tenderness   Throat:   Lips, mucosa,  and tongue normal; teeth and gums normal  Tonsils without erythema or exudates.   Neck:   Supple, symmetrical, trachea midline, no adenopathy;            Lungs:     Diminished bases, scattered rhonchi upper lobes   Chest Wall:    No tenderness or deformity    Heart:    Regular rate and rhythm, S1 and S2 normal, no murmur, rub    or gallop    Psychiatric: She has some rapid speech, breaks in thoughts, poor attention span, crying alternating with smiling                                       ASSESSMENT/PLAN    1. Acute maxillary sinusitis, recurrence not specified    - amoxicillin (AMOXIL) 875 MG tablet; Take 1 tablet by mouth 2 (Two) Times a Day for 10 days.  Dispense: 20 tablet; Refill: 0    2. Acute bronchitis due to other specified organisms    - albuterol (PROVENTIL HFA;VENTOLIN HFA) 108 (90 Base) MCG/ACT inhaler; Inhale 2 puffs Every 4 (Four) Hours As Needed for Wheezing.  Dispense: 1 inhaler; Refill: 0  - benzonatate (TESSALON PERLES) 100 MG capsule; Take 1-2 caps po every 8 hours as needed for cough  Dispense: 42 capsule; Refill: 0  - promethazine-dextromethorphan (PROMETHAZINE-DM) 6.25-15 MG/5ML syrup; Take 5 mL by mouth 4 (Four) Times a Day As Needed for Cough.  Dispense: 100 mL; Refill: 0    MsMagaly Barry had no medications administered during this visit.   Over 30 minutes spent in explanation/discussion of all above aside from examination.    AVS and information sheet given to patient, discussed in detail, questions answered

## 2018-12-18 ENCOUNTER — OFFICE VISIT (OUTPATIENT)
Dept: RETAIL CLINIC | Facility: CLINIC | Age: 63
End: 2018-12-18

## 2018-12-18 VITALS
TEMPERATURE: 97 F | HEART RATE: 95 BPM | OXYGEN SATURATION: 97 % | DIASTOLIC BLOOD PRESSURE: 78 MMHG | SYSTOLIC BLOOD PRESSURE: 124 MMHG | RESPIRATION RATE: 20 BRPM

## 2018-12-18 DIAGNOSIS — J06.9 ACUTE URI: Primary | ICD-10-CM

## 2018-12-18 PROCEDURE — 99213 OFFICE O/P EST LOW 20 MIN: CPT | Performed by: NURSE PRACTITIONER

## 2018-12-18 NOTE — PATIENT INSTRUCTIONS
"Upper Respiratory Infection, Adult  Most upper respiratory infections (URIs) are a viral infection of the air passages leading to the lungs. A URI affects the nose, throat, and upper air passages. The most common type of URI is nasopharyngitis and is typically referred to as \"the common cold.\"  URIs run their course and usually go away on their own. Most of the time, a URI does not require medical attention, but sometimes a bacterial infection in the upper airways can follow a viral infection. This is called a secondary infection. Sinus and middle ear infections are common types of secondary upper respiratory infections.  Bacterial pneumonia can also complicate a URI. A URI can worsen asthma and chronic obstructive pulmonary disease (COPD). Sometimes, these complications can require emergency medical care and may be life threatening.  What are the causes?  Almost all URIs are caused by viruses. A virus is a type of germ and can spread from one person to another.  What increases the risk?  You may be at risk for a URI if:  · You smoke.  · You have chronic heart or lung disease.  · You have a weakened defense (immune) system.  · You are very young or very old.  · You have nasal allergies or asthma.  · You work in crowded or poorly ventilated areas.  · You work in health care facilities or schools.    What are the signs or symptoms?  Symptoms typically develop 2-3 days after you come in contact with a cold virus. Most viral URIs last 7-10 days. However, viral URIs from the influenza virus (flu virus) can last 14-18 days and are typically more severe. Symptoms may include:  · Runny or stuffy (congested) nose.  · Sneezing.  · Cough.  · Sore throat.  · Headache.  · Fatigue.  · Fever.  · Loss of appetite.  · Pain in your forehead, behind your eyes, and over your cheekbones (sinus pain).  · Muscle aches.    How is this diagnosed?  Your health care provider may diagnose a URI by:  · Physical exam.  · Tests to check that your " symptoms are not due to another condition such as:  ? Strep throat.  ? Sinusitis.  ? Pneumonia.  ? Asthma.    How is this treated?  A URI goes away on its own with time. It cannot be cured with medicines, but medicines may be prescribed or recommended to relieve symptoms. Medicines may help:  · Reduce your fever.  · Reduce your cough.  · Relieve nasal congestion.    Follow these instructions at home:  · Take medicines only as directed by your health care provider.  · Gargle warm saltwater or take cough drops to comfort your throat as directed by your health care provider.  · Use a warm mist humidifier or inhale steam from a shower to increase air moisture. This may make it easier to breathe.  · Drink enough fluid to keep your urine clear or pale yellow.  · Eat soups and other clear broths and maintain good nutrition.  · Rest as needed.  · Return to work when your temperature has returned to normal or as your health care provider advises. You may need to stay home longer to avoid infecting others. You can also use a face mask and careful hand washing to prevent spread of the virus.  · Increase the usage of your inhaler if you have asthma.  · Do not use any tobacco products, including cigarettes, chewing tobacco, or electronic cigarettes. If you need help quitting, ask your health care provider.  How is this prevented?  The best way to protect yourself from getting a cold is to practice good hygiene.  · Avoid oral or hand contact with people with cold symptoms.  · Wash your hands often if contact occurs.    There is no clear evidence that vitamin C, vitamin E, echinacea, or exercise reduces the chance of developing a cold. However, it is always recommended to get plenty of rest, exercise, and practice good nutrition.  Contact a health care provider if:  · You are getting worse rather than better.  · Your symptoms are not controlled by medicine.  · You have chills.  · You have worsening shortness of breath.  · You have  brown or red mucus.  · You have yellow or brown nasal discharge.  · You have pain in your face, especially when you bend forward.  · You have a fever.  · You have swollen neck glands.  · You have pain while swallowing.  · You have white areas in the back of your throat.  Get help right away if:  · You have severe or persistent:  ? Headache.  ? Ear pain.  ? Sinus pain.  ? Chest pain.  · You have chronic lung disease and any of the following:  ? Wheezing.  ? Prolonged cough.  ? Coughing up blood.  ? A change in your usual mucus.  · You have a stiff neck.  · You have changes in your:  ? Vision.  ? Hearing.  ? Thinking.  ? Mood.  This information is not intended to replace advice given to you by your health care provider. Make sure you discuss any questions you have with your health care provider.  Document Released: 06/13/2002 Document Revised: 08/20/2017 Document Reviewed: 03/25/2015  [employer] Interactive Patient Education © 2018 [employer]

## 2018-12-18 NOTE — PROGRESS NOTES
"Chon Barry is a 63 y.o. female.     Cough   This is a new problem. Episode onset: 2 days ago. The problem has been unchanged. The problem occurs hourly. The cough is non-productive. Associated symptoms include ear congestion, ear pain (\"pressure\"), nasal congestion, postnasal drip and a sore throat. Pertinent negatives include no chills, eye redness, fever, headaches, heartburn, hemoptysis, myalgias, rhinorrhea, shortness of breath or wheezing. Nothing aggravates the symptoms. Risk factors for lung disease include smoking/tobacco exposure. She has tried nothing for the symptoms. Her past medical history is significant for environmental allergies. There is no history of asthma or bronchitis.       The following portions of the patient's history were reviewed and updated as appropriate: allergies, current medications, past medical history, past social history, past surgical history and problem list.    Review of Systems   Constitutional: Negative for appetite change, chills, diaphoresis, fatigue and fever.   HENT: Positive for congestion, ear pain (\"pressure\"), postnasal drip and sore throat. Negative for ear discharge, hearing loss, nosebleeds, rhinorrhea, sinus pressure, sneezing, tinnitus, trouble swallowing and voice change.    Eyes: Negative for redness and itching.   Respiratory: Positive for cough (mild, nonproductive). Negative for hemoptysis, chest tightness, shortness of breath, wheezing and stridor.    Gastrointestinal: Negative for diarrhea, heartburn, nausea and vomiting.   Musculoskeletal: Negative for myalgias.   Allergic/Immunologic: Positive for environmental allergies. Negative for immunocompromised state.   Neurological: Negative for dizziness and headaches.       Objective   Physical Exam   Constitutional: She is oriented to person, place, and time. She appears well-developed and well-nourished. She is cooperative.  Non-toxic appearance. She does not appear ill. No distress.   HENT: "   Right Ear: External ear and ear canal normal. Tympanic membrane is retracted. Tympanic membrane is not perforated, not erythematous and not bulging. A middle ear effusion (air-fluid level) is present.   Left Ear: External ear and ear canal normal. Tympanic membrane is retracted. Tympanic membrane is not perforated, not erythematous and not bulging. A middle ear effusion (air-fluid level) is present.   Nose: Mucosal edema present. No rhinorrhea. Right sinus exhibits no maxillary sinus tenderness and no frontal sinus tenderness. Left sinus exhibits no maxillary sinus tenderness and no frontal sinus tenderness.   Mouth/Throat: Uvula is midline, oropharynx is clear and moist and mucous membranes are normal. No oral lesions. No uvula swelling. No oropharyngeal exudate, posterior oropharyngeal edema or posterior oropharyngeal erythema. Tonsils are 2+ on the right. Tonsils are 2+ on the left. No tonsillar exudate.   Eyes: Conjunctivae and lids are normal.   Cardiovascular: Normal rate, regular rhythm, S1 normal and S2 normal.   Pulmonary/Chest: Effort normal and breath sounds normal.   Lymphadenopathy:     She has no cervical adenopathy.   Neurological: She is alert and oriented to person, place, and time.   Skin: Skin is warm and dry. She is not diaphoretic. No pallor.   Vitals reviewed.      Assessment/Plan   Celina was seen today for uri.    Diagnoses and all orders for this visit:    Acute URI     -   Continue with singulair, zyrtec, and flonase   -   Warm salt water gargles, throat lozenges, tylenol/ibuprofen PRN pain relief   -   F/U with PCP for persistent symptoms or UC/ER for worsening symptoms

## 2019-02-11 ENCOUNTER — APPOINTMENT (OUTPATIENT)
Dept: PHYSICAL THERAPY | Facility: HOSPITAL | Age: 64
End: 2019-02-11

## 2019-02-13 ENCOUNTER — APPOINTMENT (OUTPATIENT)
Dept: PHYSICAL THERAPY | Facility: HOSPITAL | Age: 64
End: 2019-02-13

## 2019-02-21 ENCOUNTER — HOSPITAL ENCOUNTER (OUTPATIENT)
Dept: PHYSICAL THERAPY | Facility: HOSPITAL | Age: 64
Setting detail: THERAPIES SERIES
Discharge: HOME | End: 2019-02-21

## 2019-02-21 DIAGNOSIS — M51.36 DDD (DEGENERATIVE DISC DISEASE), LUMBAR: Primary | ICD-10-CM

## 2019-02-21 DIAGNOSIS — M54.10 RADICULOPATHY WITH LOWER EXTREMITY SYMPTOMS: ICD-10-CM

## 2019-02-21 PROCEDURE — 97110 THERAPEUTIC EXERCISES: CPT | Performed by: PHYSICAL THERAPIST

## 2019-02-21 PROCEDURE — 97161 PT EVAL LOW COMPLEX 20 MIN: CPT | Performed by: PHYSICAL THERAPIST

## 2019-02-21 NOTE — THERAPY EVALUATION
Outpatient Physical Therapy Ortho Treatment Note  GERRY Perez     Patient Name: Celina Barry  : 1955  MRN: [id number]  Today's Date: 2019      Visit Date: 2019    Visit Dx:    ICD-10-CM ICD-9-CM   1. DDD (degenerative disc disease), lumbar M51.36 722.52   2. Radiculopathy with lower extremity symptoms M54.10 724.4       Patient Active Problem List   Diagnosis   • Urinary frequency   • Acute midline low back pain without sciatica        Past Medical History:   Diagnosis Date   • Allergic    • Osteoarthritis    • Sciatica         Past Surgical History:   Procedure Laterality Date   • SHOULDER SURGERY Right    • SKIN CANCER EXCISION         PT Ortho     Row Name 19 1320       Posture/Observations    Forward Head  Mild  -SP    Cervical Lordosis  Decreased  -SP    Thoracic Kyphosis  Mild  -SP    Lumbar lordosis  Increased  -SP    Iliac crests  Bilateral:;Normal  -SP    Genu valgus  Bilateral:;Increased  -SP    Foot pronation  Bilateral:;Mild  -SP    Observations  Edema  -SP    Posture/Observations Comments  presents with apparent right anterior innominate  -SP       Neural Tension Signs- Lower Quarter Clearing    SLR  Bilateral:;Negative  -SP       Sensory Screen for Light Touch- Lower Quarter Clearing    L2 (anterior mid thigh)  Left:;Diminished  -SP    L5 (lateral lower leg/great toe)  Right:;Diminished  -SP       Myotomal Screen- Lower Quarter Clearing    Hip flexion (L2)  Right:;3+ (Fair +);Left:;4- (Good -)  -SP    Knee extension (L3)  Right:;4- (Good -);Left:;4 (Good)  -SP    Ankle DF (L4)  Right:;4- (Good -);Left:;4 (Good)  -SP    Great toe extension (L5)  Right:;4- (Good -);Left:;4 (Good)  -SP    Ankle PF (S1)  4- (Good -);Right:;Left:;4 (Good)  -SP    Knee flexion (S2)  Right:;3+ (Fair +);Left:;4- (Good -)  -SP       Lumbar/SI Special Tests    SLR (Neural Tension)  Bilateral:;Negative  -SP       Lumbosacral Palpation    SI  Bilateral:;Tender  -SP    Erector Spinae (Paraspinals)   Bilateral:;Tender;Guarded/taut  -SP       Sensation    Sensation WNL?  WFL  -SP       Flexibility    Flexibility Tested?  Lower Extremity  -SP       Lower Extremity Flexibility    Hamstrings  Bilateral:;Moderately limited  -SP    Hip Flexors  Bilateral:;Mildly limited  -SP    Quadriceps  Bilateral:;Mildly limited  -SP    Hip External Rotators  Bilateral:;Moderately limited  -SP    Hip Internal Rotators  Bilateral:;Moderately limited  -SP    Gastrocnemius  Bilateral:;Moderately limited  -SP       Balance Skills Training    SLS  unable to stand without UE support  -SP       Transfers    Sit-Stand [place] (Transfers)  independent  -SP    Comment (Transfers)  uses UEs to assist with transfers to stand  -SP       Gait/Stairs Assessment/Training    Deviations/Abnormal Patterns (Gait)  gait speed decreased;chloé decreased  -SP    [place] Level (Stairs)  independent  -SP      User Key  (r) = Recorded By, (t) = Taken By, (c) = Cosigned By    Initials Name Provider Type    SP Priscila Gonzalez, PT Physical Therapist                      PT Assessment/Plan     Row Name 02/21/19 8588          PT Assessment    Assessment Comments  Patient with chronic history of low back and bilateral LE pain that has not improved with previous therapy or intervention.  She has had some decrease in pain with recent steroids.  Patient complains of pain in multiple body parts including neck arms back and throughout LEs.  Patient presents with pain, decreased trunk and LE strength, difficulty tolerating static positions sit and stand and that limits her functional status.   -SP     Rehab Potential  Fair  -SP     Patient/caregiver participated in establishment of treatment plan and goals  Yes  -SP     Patient would benefit from skilled therapy intervention  Yes  -SP        PT Plan    PT Frequency  2x/week  -SP     Predicted Duration of Therapy Intervention (Therapy Eval)  4 weeks  -SP     Planned CPT's?  PT EVAL LOW COMPLEXITY:  "20036;PT MANUAL THERAPY EA 15 MIN: 52986;PT HOT OR COLD PACK TREAT [facility];PT ULTRASOUND EA 15 MIN: 86118;PT THER PROC EA 15 MIN: 93212;PT ELECTRICAL STIM UNATTEND:   -SP       User Key  (r) = Recorded By, (t) = Taken By, (c) = Cosigned By    Initials Name Provider Type    Priscila Wiggins, PT Physical Therapist          Modalities     Row Name 02/21/19 1320             Moist Heat    MH Applied  Yes  -SP      Location  (B) L/S area  -SP      Rx Minutes  12 mins  -SP      MH Prior to Rx  Yes  -SP         ELECTRICAL STIMULATION    Attended/Unattended  Unattended  -SP      Stimulation Type  IFC  -SP      Location/Electrode Placement/Other  (B) L/S area  -SP         Other Treatment Provided    Taping / Bracing  kinesiotape: 3 \"I\" strips in x pattern space correct over right SI joint  -SP        User Key  (r) = Recorded By, (t) = Taken By, (c) = Cosigned By    Initials Name Provider Type    Priscila Wiggins, PT Physical Therapist          Exercises     Row Name 02/21/19 1320             Exercise 1    Exercise Name 1  LTR  -SP      Reps 1  3  -SP      Time 1  20 sec  -SP      Reps 1  10  -SP         Exercise 2    Exercise Name 2  SKTC  -SP      Reps 2  3  -SP      Time 2  20  -SP      Reps 2  3  -SP         Exercise 3    Exercise Name 3  piriformis stretch push away/pulltoward  -SP      Reps 3  3  -SP      Time 3  20  -SP      Reps 3  3  -SP         Exercise 4    Exercise Name 4  PPT  -SP      Reps 4  10  -SP      Time 4  5 sec  -SP      Reps 4  15  -SP         Exercise 5    Exercise Name 5  PPT with ball squeeze  -SP      Reps 5  15  -SP         Exercise 6    Exercise Name 6  PPT with BKFO  -SP      Reps 6  20  -SP         Exercise 7    Exercise Name 7  PPT with ball rolls  -SP      Reps 7  15  -SP         Exercise 8    Exercise Name 8  PPT vs wall  -SP      Reps 8  10  -SP        User Key  (r) = Recorded By, (t) = Taken By, (c) = Cosigned By    Initials Name Provider Type    Priscila Wiggins" Cuca, PT Physical Therapist                         PT OP Goals     Row Name 02/21/19 1320          PT Short Term Goals    STG 1  Patient demonstrates trunk AROM to wfl without complaints of pain at end range  -SP     STG 2  Patient reports decreased radicular type symptoms into (B) LEs by 25%  -SP     STG 3  Patient reports she is able to tolerate sit or ride/drive car for 30 min with pain level <3/10 at worst  -SP        Long Term Goals    LTG 1  Patient reports improved ability to drive or sit for >1 hour with pain level <2/10  -SP     LTG 2  Patient demonstrates increased trunk and LE strength by one muscle grade to better tolerate ADLs  -SP     LTG 3  Patient independent with HEP  -SP        Time Calculation    PT Goal Re-Cert Due Date  03/23/19  -SP       User Key  (r) = Recorded By, (t) = Taken By, (c) = Cosigned By    Initials Name Provider Type    SP Priscila Gonzalez, PT Physical Therapist          Therapy Education  Given: HEP  Program: New  How Provided: Verbal, Written  Provided to: Patient  Level of Understanding: Verbalized, Demonstrated              Time Calculation:   Start Time: 1320  Stop Time: 1515  Time Calculation (min): 115 min  Therapy Suggested Charges     Code   Minutes Charges    None           Therapy Charges for Today     Code Description Service Date Service Provider Modifiers Qty    65652252238 HC PT THER PROC EA 15 MIN 2/21/2019 Priscila Gonzalez, PT GP 1    04663594399 HC PT EVAL LOW COMPLEXITY 3 2/21/2019 Priscila Gonzalez, PT GP 1                    Priscila Gonzalez, PT  2/21/2019

## 2019-02-25 ENCOUNTER — HOSPITAL ENCOUNTER (OUTPATIENT)
Dept: PHYSICAL THERAPY | Facility: HOSPITAL | Age: 64
Setting detail: THERAPIES SERIES
Discharge: HOME | End: 2019-02-25

## 2019-02-25 DIAGNOSIS — M54.10 RADICULOPATHY WITH LOWER EXTREMITY SYMPTOMS: ICD-10-CM

## 2019-02-25 DIAGNOSIS — M54.30 SCIATICA, UNSPECIFIED LATERALITY: ICD-10-CM

## 2019-02-25 DIAGNOSIS — M51.36 DDD (DEGENERATIVE DISC DISEASE), LUMBAR: Primary | ICD-10-CM

## 2019-02-25 PROCEDURE — G0283 ELEC STIM OTHER THAN WOUND: HCPCS | Performed by: PHYSICAL THERAPIST

## 2019-02-25 PROCEDURE — 97110 THERAPEUTIC EXERCISES: CPT | Performed by: PHYSICAL THERAPIST

## 2019-02-25 NOTE — THERAPY TREATMENT NOTE
Outpatient Physical Therapy Ortho Treatment Note  [facility]Y [facility]     Patient Name: Celina Barry  : 1955  MRN: [id number]  Today's Date: 2019      Visit Date: 2019    Visit Dx:    ICD-10-CM ICD-9-CM   1. DDD (degenerative disc disease), lumbar M51.36 722.52   2. Radiculopathy with lower extremity symptoms M54.10 724.4   3. Sciatica, unspecified laterality M54.30 724.3       Patient Active Problem List   Diagnosis   • Urinary frequency   • Acute midline low back pain without sciatica        Past Medical History:   Diagnosis Date   • Allergic    • Osteoarthritis    • Sciatica         Past Surgical History:   Procedure Laterality Date   • SHOULDER SURGERY Right    • SKIN CANCER EXCISION         PT Ortho     Row Name 19 1300       Subjective Comments    Subjective Comments  Patient reports that she has had increased pain in back and (B) LE since Saturday but cannot relate any change or increase in activity.  She reports that she does wear some houseshoes that may be causing her increased pain, because she did not wear them today and is feeling a little better.  Patient becomes tearful and states that she is crippled and unable to walk and do activity.    -SP      User Key  (r) = Recorded By, (t) = Taken By, (c) = Cosigned By    Initials Name Provider Type    Priscila Wiggins, PT Physical Therapist                      PT Assessment/Plan     Row Name 19 1516          PT Assessment    Assessment Comments  Patient with fair tolerance for progression of ther-ex.   Patient with some complaints of hamstring cramps with trunk stabilization exercises  -SP        PT Plan    PT Plan Comments  Continue to progress as tolerable  -SP       User Key  (r) = Recorded By, (t) = Taken By, (c) = Cosigned By    Initials Name Provider Type    Priscila Wiggins, PT Physical Therapist          Modalities     Row Name 19 1300             Moist Heat    MH Applied  Yes  -SP      Location  (B)  "L/S area  -SP      Rx Minutes  12 mins  -SP      MH Prior to Rx  Yes  -SP         ELECTRICAL STIMULATION    Attended/Unattended  Unattended  -SP      Stimulation Type  IFC  -SP      Location/Electrode Placement/Other  (B) L/S area  -SP         Other Treatment Provided    Taping / Bracing  kinesiotape: 3 \"I\" strips in x pattern space correct over right SI joint  -SP        User Key  (r) = Recorded By, (t) = Taken By, (c) = Cosigned By    Initials Name Provider Type    Priscila Wiggins, PT Physical Therapist          Exercises     Row Name 02/25/19 1300             Subjective Comments    Subjective Comments  Patient reports that she has had increased pain in back and (B) LE since Saturday but cannot relate any change or increase in activity.  She reports that she does wear some houseshoes that may be causing her increased pain, because she did not wear them today and is feeling a little better.  Patient becomes tearful and states that she is crippled and unable to walk and do activity.    -SP         Exercise 1    Exercise Name 1  LTR  -SP      Reps 1  3  -SP      Time 1  20 sec  -SP      Reps 1  10  -SP         Exercise 2    Exercise Name 2  SKTC  -SP      Reps 2  3  -SP      Time 2  20  -SP      Reps 2  3  -SP         Exercise 3    Exercise Name 3  piriformis stretch push away/pulltoward  -SP      Reps 3  3  -SP      Time 3  20  -SP      Reps 3  3  -SP         Exercise 4    Exercise Name 4  PPT  -SP      Reps 4  10  -SP      Time 4  5 sec  -SP      Reps 4  15  -SP         Exercise 5    Exercise Name 5  PPT with ball squeeze  -SP      Reps 5  15  -SP      Time 5  5 sec  -SP      Reps 5  15  -SP         Exercise 6    Exercise Name 6  PPT with BKFO  -SP      Reps 6  10  -SP      Time 6  blue tband  -SP      Reps 6  20  -SP         Exercise 7    Exercise Name 7  PPT with ball rolls  -SP      Reps 7  15  -SP      Time 7  5 sec  -SP      Reps 7  15  -SP         Exercise 8    Exercise Name 8  PPT vs wall  -SP   "    Reps 8  10  -SP        User Key  (r) = Recorded By, (t) = Taken By, (c) = Cosigned By    Initials Name Provider Type    Priscila Wiggins, VILMA Physical Therapist                        Manual Rx (last 36 hours)      Manual Treatments     Row Name 02/25/19 1300             Manual Rx 1    Manual Rx 1 Location  pelvis  -SP      Manual Rx 1 Type  MET: shotgun and right anterior innominate correct  -SP        User Key  (r) = Recorded By, (t) = Taken By, (c) = Cosigned By    Initials Name Provider Type    Priscila Wiggins, PT Physical Therapist              Therapy Education  Given: HEP  Program: Progressed  How Provided: Verbal, Written  Provided to: Patient  Level of Understanding: Demonstrated              Time Calculation:   Start Time: 1335  Stop Time: 1440  Time Calculation (min): 65 min  Therapy Suggested Charges     Code   Minutes Charges    None           Therapy Charges for Today     Code Description Service Date Service Provider Modifiers Qty    08511004040 HC PT ELECTRICAL STIM UNATTENDED 2/25/2019 Priscila Gonzalez, PT  1    57299134701 HC PT THER PROC EA 15 MIN 2/25/2019 Priscila Gonzalez, PT GP 2                    Priscila Gonzalez, PT  2/25/2019

## 2019-02-27 ENCOUNTER — HOSPITAL ENCOUNTER (OUTPATIENT)
Dept: PHYSICAL THERAPY | Facility: HOSPITAL | Age: 64
Setting detail: THERAPIES SERIES
Discharge: HOME | End: 2019-02-27

## 2019-02-27 DIAGNOSIS — M54.10 RADICULOPATHY WITH LOWER EXTREMITY SYMPTOMS: ICD-10-CM

## 2019-02-27 DIAGNOSIS — M51.36 DDD (DEGENERATIVE DISC DISEASE), LUMBAR: Primary | ICD-10-CM

## 2019-02-27 DIAGNOSIS — M54.30 SCIATICA, UNSPECIFIED LATERALITY: ICD-10-CM

## 2019-02-27 PROCEDURE — G0283 ELEC STIM OTHER THAN WOUND: HCPCS | Performed by: PHYSICAL THERAPIST

## 2019-02-27 PROCEDURE — 97110 THERAPEUTIC EXERCISES: CPT | Performed by: PHYSICAL THERAPIST

## 2019-02-27 NOTE — THERAPY TREATMENT NOTE
"    Outpatient Physical Therapy Ortho Treatment Note  GERRY Perez     Patient Name: Celina Barry  : 1955  MRN: [id number]  Today's Date: 2019      Visit Date: 2019    Visit Dx:    ICD-10-CM ICD-9-CM   1. DDD (degenerative disc disease), lumbar M51.36 722.52   2. Radiculopathy with lower extremity symptoms M54.10 724.4   3. Sciatica, unspecified laterality M54.30 724.3       Patient Active Problem List   Diagnosis   • Urinary frequency   • Acute midline low back pain without sciatica        Past Medical History:   Diagnosis Date   • Allergic    • Osteoarthritis    • Sciatica         Past Surgical History:   Procedure Laterality Date   • SHOULDER SURGERY Right    • SKIN CANCER EXCISION         PT Ortho     Row Name 19 1005       Subjective Comments    Subjective Comments  Patient reports that she continues to have low back pain and pain into left lateral and posterior thigh.  Patient voicing multiple concern over weakness in legs and \"loose skin\" over the from of her thighs.  She is also concerned regarding veins in the posterior aspect of knee and feels like her leg is tight or something may be wrong with her circulation.  Patient very anxious regarding work she must do over the next couple of days.  -SP       Posture/Observations    Posture/Observations Comments  Patient presents with increased forward flexed posture with gait and standing  -SP    Row Name 19 1300       Subjective Comments    Subjective Comments  Patient reports that she has had increased pain in back and (B) LE since Saturday but cannot relate any change or increase in activity.  She reports that she does wear some houseshoes that may be causing her increased pain, because she did not wear them today and is feeling a little better.  Patient becomes tearful and states that she is crippled and unable to walk and do activity.    -SP      User Key  (r) = Recorded By, (t) = Taken By, (c) = Cosigned By    Initials Name Provider " "Type    Priscila Wiggins, PT Physical Therapist                      PT Assessment/Plan     Row Name 02/27/19 1215          PT Assessment    Assessment Comments  Patient exhibits improved posture following treatment with decreased trunk flexion observed in weight bearing  -SP        PT Plan    PT Plan Comments  Continue per POC  -SP       User Key  (r) = Recorded By, (t) = Taken By, (c) = Cosigned By    Initials Name Provider Type    Priscila Wiggins, PT Physical Therapist          Modalities     Row Name 02/27/19 1005             Moist Heat    MH Applied  Yes  -SP      Location  (B) L/S area  -SP      Rx Minutes  12 mins  -SP      MH Prior to Rx  Yes  -SP         ELECTRICAL STIMULATION    Attended/Unattended  Unattended  -SP      Stimulation Type  IFC  -SP      Location/Electrode Placement/Other  (B) L/S area  -SP         Other Treatment Provided    Taping / Bracing  kinesiotape:  2 \"I\" strips anchored at SI joints and pulled along Lumbar and lower thoracic PS with space correct over lower lumbar area  -SP        User Key  (r) = Recorded By, (t) = Taken By, (c) = Cosigned By    Initials Name Provider Type    Priscila Wiggins, PT Physical Therapist          Exercises     Row Name 02/27/19 1005             Subjective Comments    Subjective Comments  Patient reports that she continues to have low back pain and pain into left lateral and posterior thigh.  Patient voicing multiple concern over weakness in legs and \"loose skin\" over the from of her thighs.  She is also concerned regarding veins in the posterior aspect of knee and feels like her leg is tight or something may be wrong with her circulation.  Patient very anxious regarding work she must do over the next couple of days.  -SP         Exercise 1    Exercise Name 1  LTR  -SP      Reps 1  3  -SP      Time 1  20 sec  -SP      Reps 1  10  -SP         Exercise 2    Exercise Name 2  SKTC  -SP      Reps 2  3  -SP      Time 2  20  -SP   "    Reps 2  3  -SP         Exercise 3    Exercise Name 3  piriformis stretch push away/pulltoward  -SP      Reps 3  3  -SP      Time 3  20  -SP      Reps 3  3  -SP         Exercise 4    Exercise Name 4  PPT  -SP      Reps 4  10  -SP      Time 4  5 sec  -SP      Reps 4  15  -SP         Exercise 5    Exercise Name 5  PPT with ball squeeze  -SP      Reps 5  15  -SP      Time 5  5 sec  -SP      Reps 5  15  -SP         Exercise 6    Exercise Name 6  PPT with BKFO  -SP      Reps 6  15 each  -SP      Time 6  blue tband  -SP      Reps 6  20  -SP         Exercise 7    Exercise Name 7  PPT with ball rolls  -SP      Reps 7  15  -SP      Time 7  5 sec  -SP      Reps 7  15  -SP         Exercise 8    Exercise Name 8  PPT vs wall  -SP      Reps 8  10  -SP        User Key  (r) = Recorded By, (t) = Taken By, (c) = Cosigned By    Initials Name Provider Type    Priscila Wiggins, VILMA Physical Therapist                        Manual Rx (last 36 hours)      Manual Treatments     Row Name 02/27/19 1005             Manual Rx 1    Manual Rx 1 Location  pelvis  -SP      Manual Rx 1 Type  MET: shotgun and right anterior innominate correct  -SP        User Key  (r) = Recorded By, (t) = Taken By, (c) = Cosigned By    Initials Name Provider Type    Priscila Wiggins PT Physical Therapist              Therapy Education  Education Details: Patient educated regarding kinesiotape and instructed to leave on until Saturday unless tape is painful or irritating skin              Time Calculation:   Start Time: 1005  Stop Time: 1115  Time Calculation (min): 70 min  Therapy Suggested Charges     Code   Minutes Charges    None           Therapy Charges for Today     Code Description Service Date Service Provider Modifiers Qty    05896353928 HC PT ELECTRICAL STIM UNATTENDED 2/27/2019 Priscila Gonzalez, PT  1    31358145266 HC PT THER PROC EA 15 MIN 2/27/2019 Priscila Gonzalez, PT GP 1                    Priscila Thurman  Carlos, PT  2/27/2019

## 2019-03-04 ENCOUNTER — APPOINTMENT (OUTPATIENT)
Dept: PHYSICAL THERAPY | Facility: HOSPITAL | Age: 64
End: 2019-03-04

## 2019-03-06 ENCOUNTER — HOSPITAL ENCOUNTER (OUTPATIENT)
Dept: PHYSICAL THERAPY | Facility: HOSPITAL | Age: 64
Setting detail: THERAPIES SERIES
Discharge: HOME | End: 2019-03-06

## 2019-03-06 DIAGNOSIS — M51.36 DDD (DEGENERATIVE DISC DISEASE), LUMBAR: Primary | ICD-10-CM

## 2019-03-06 DIAGNOSIS — M54.30 SCIATICA, UNSPECIFIED LATERALITY: ICD-10-CM

## 2019-03-06 DIAGNOSIS — M54.10 RADICULOPATHY WITH LOWER EXTREMITY SYMPTOMS: ICD-10-CM

## 2019-03-06 PROCEDURE — G0283 ELEC STIM OTHER THAN WOUND: HCPCS | Performed by: PHYSICAL THERAPIST

## 2019-03-06 PROCEDURE — 97110 THERAPEUTIC EXERCISES: CPT | Performed by: PHYSICAL THERAPIST

## 2019-03-06 NOTE — THERAPY TREATMENT NOTE
"    Outpatient Physical Therapy Ortho Treatment Note  [facility] [place]     Patient Name: Celina Barry  : 1955  MRN: [id number]  Today's Date: 3/6/2019      Visit Date: 2019    Visit Dx:    ICD-10-CM ICD-9-CM   1. DDD (degenerative disc disease), lumbar M51.36 722.52   2. Radiculopathy with lower extremity symptoms M54.10 724.4   3. Sciatica, unspecified laterality M54.30 724.3       Patient Active Problem List   Diagnosis   • Urinary frequency   • Acute midline low back pain without sciatica        Past Medical History:   Diagnosis Date   • Allergic    • Osteoarthritis    • Sciatica         Past Surgical History:   Procedure Laterality Date   • SHOULDER SURGERY Right    • SKIN CANCER EXCISION         PT Ortho     Row Name 19 0812       Subjective Comments    Subjective Comments  Patient reports that she has had good days and bad days.  She reports that her back did feel better with kinesiotape applied and she was better able to perform work duties.  Patient reports that her left knee continues to bother her and states \"my kneecap does not feel right\"  She describes tightness in posterior aspect of knee and leg.  -SP      User Key  (r) = Recorded By, (t) = Taken By, (c) = Cosigned By    Initials Name Provider Type    Priscila Wiggins, PT Physical Therapist                      PT Assessment/Plan     Row Name 19 0951          PT Assessment    Assessment Comments  Patient with decreased complaints of back pain following ther-ex.  Continues to report left knee painand discomfort  -SP        PT Plan    PT Plan Comments  Continue per POC  -SP       User Key  (r) = Recorded By, (t) = Taken By, (c) = Cosigned By    Initials Name Provider Type    Priscila Wiggins, PT Physical Therapist          Modalities     Row Name 19 0867             Moist Heat    MH Applied  Yes  -SP      Location  (B) L/S area  -SP      Rx Minutes  12 mins  -SP      MH Prior to Rx  Yes  -SP         " "ELECTRICAL STIMULATION    Attended/Unattended  Unattended  -SP      Stimulation Type  IFC  -SP      Location/Electrode Placement/Other  (B) L/S area  -SP         Other Treatment Provided    Taping / Bracing  kinesiotape:  2 \"I\" strips anchored at SI joints and pulled along Lumbar and lower thoracic PS with space correct over lower lumbar area  -SP        User Key  (r) = Recorded By, (t) = Taken By, (c) = Cosigned By    Initials Name Provider Type    Priscila Wiggins, PT Physical Therapist          Exercises     Row Name 03/06/19 0812             Subjective Comments    Subjective Comments  Patient reports that she has had good days and bad days.  She reports that her back did feel better with kinesiotape applied and she was better able to perform work duties.  Patient reports that her left knee continues to bother her and states \"my kneecap does not feel right\"  She describes tightness in posterior aspect of knee and leg.  -SP         Exercise 1    Exercise Name 1  LTR  -SP      Reps 1  3  -SP      Time 1  20 sec  -SP      Reps 1  10  -SP         Exercise 2    Exercise Name 2  SKTC  -SP      Reps 2  3  -SP      Time 2  20  -SP      Reps 2  3  -SP         Exercise 3    Exercise Name 3  piriformis stretch push away/pulltoward  -SP      Reps 3  3  -SP      Time 3  20  -SP      Reps 3  3  -SP         Exercise 4    Exercise Name 4  PPT  -SP      Reps 4  10  -SP      Time 4  5 sec  -SP      Reps 4  15  -SP         Exercise 5    Exercise Name 5  PPT with ball squeeze  -SP      Reps 5  15  -SP      Time 5  5 sec  -SP      Reps 5  15  -SP         Exercise 6    Exercise Name 6  PPT with BKFO  -SP      Reps 6  15 each  -SP      Time 6  blue tband  -SP      Reps 6  20  -SP         Exercise 7    Exercise Name 7  PPT with ball rolls  -SP      Reps 7  15  -SP      Time 7  5 sec  -SP      Reps 7  15  -SP         Exercise 8    Exercise Name 8  PPT vs wall  -SP      Reps 8  10  -SP        User Key  (r) = Recorded By, (t) = " Taken By, (c) = Cosigned By    Initials Name Provider Type    Priscila Wiggins, PT Physical Therapist                        Manual Rx (last 36 hours)      Manual Treatments     Row Name 03/06/19 0812             Manual Rx 1    Manual Rx 1 Location  pelvis  -SP      Manual Rx 1 Type  MET: shotgun and right anterior innominate correct  -SP        User Key  (r) = Recorded By, (t) = Taken By, (c) = Cosigned By    Initials Name Provider Type    Priscila Wiggins, PT Physical Therapist                             Time Calculation:   Start Time: 0812  Stop Time: 0920  Time Calculation (min): 68 min  Therapy Suggested Charges     Code   Minutes Charges    None           Therapy Charges for Today     Code Description Service Date Service Provider Modifiers Qty    92653091734 HC PT ELECTRICAL STIM UNATTENDED 3/6/2019 Priscila Gonzalez, PT  1    96695106021 HC PT THER PROC EA 15 MIN 3/6/2019 Priscila Gonzalez, PT GP 1                    Priscila Gonzalez PT  3/6/2019

## 2019-03-12 ENCOUNTER — HOSPITAL ENCOUNTER (OUTPATIENT)
Dept: PHYSICAL THERAPY | Facility: HOSPITAL | Age: 64
Setting detail: THERAPIES SERIES
Discharge: HOME | End: 2019-03-12

## 2019-03-12 DIAGNOSIS — M54.10 RADICULOPATHY WITH LOWER EXTREMITY SYMPTOMS: ICD-10-CM

## 2019-03-12 DIAGNOSIS — M51.36 DDD (DEGENERATIVE DISC DISEASE), LUMBAR: Primary | ICD-10-CM

## 2019-03-12 DIAGNOSIS — M54.30 SCIATICA, UNSPECIFIED LATERALITY: ICD-10-CM

## 2019-03-12 PROCEDURE — 97110 THERAPEUTIC EXERCISES: CPT | Performed by: PHYSICAL THERAPIST

## 2019-03-12 PROCEDURE — G0283 ELEC STIM OTHER THAN WOUND: HCPCS | Performed by: PHYSICAL THERAPIST

## 2019-03-12 NOTE — THERAPY TREATMENT NOTE
Outpatient Physical Therapy Ortho Treatment Note  [facility]ERRY Perez     Patient Name: Celina Barry  : 1955  MRN: [id number]  Today's Date: 3/12/2019      Visit Date: 2019    Visit Dx:    ICD-10-CM ICD-9-CM   1. DDD (degenerative disc disease), lumbar M51.36 722.52   2. Radiculopathy with lower extremity symptoms M54.10 724.4   3. Sciatica, unspecified laterality M54.30 724.3       Patient Active Problem List   Diagnosis   • Urinary frequency   • Acute midline low back pain without sciatica        Past Medical History:   Diagnosis Date   • Allergic    • Osteoarthritis    • Sciatica         Past Surgical History:   Procedure Laterality Date   • SHOULDER SURGERY Right    • SKIN CANCER EXCISION         PT Ortho     Row Name 19 0918       Subjective Comments    Subjective Comments  Patient reports that she continues to have left knee pain.  She reports that she has had a strange stretching sensation across her lumbar area spine.    -SP      User Key  (r) = Recorded By, (t) = Taken By, (c) = Cosigned By    Initials Name Provider Type    Priscila Wiggins, PT Physical Therapist                      PT Assessment/Plan     Row Name 19 1017          PT Assessment    Assessment Comments  Patient continues to complain of left LE/knee pain.  She tolerates ther-ex well today with progresssion and no muscle spasm  -SP        PT Plan    PT Plan Comments  Continue per POC  -SP       User Key  (r) = Recorded By, (t) = Taken By, (c) = Cosigned By    Initials Name Provider Type    Priscila Wiggins, PT Physical Therapist          Modalities     Row Name 19 0918             Moist Heat    MH Applied  Yes  -SP      Location  (B) L/S area  -SP      Rx Minutes  12 mins  -SP      MH Prior to Rx  Yes  -SP         ELECTRICAL STIMULATION    Attended/Unattended  Unattended  -SP      Stimulation Type  IFC  -SP      Location/Electrode Placement/Other  (B) L/S area  -SP         Other Treatment Provided     Taping / Bracing  kinesiotape:  for left knee support  -SP        User Key  (r) = Recorded By, (t) = Taken By, (c) = Cosigned By    Initials Name Provider Type    Priscila Wiggins, VILMA Physical Therapist          Exercises     Row Name 03/12/19 0918             Subjective Comments    Subjective Comments  Patient reports that she continues to have left knee pain.  She reports that she has had a strange stretching sensation across her lumbar area spine.    -SP         Exercise 1    Exercise Name 1  LTR  -SP      Reps 1  3  -SP      Time 1  20 sec  -SP      Reps 1  10  -SP         Exercise 2    Exercise Name 2  SKTC  -SP      Reps 2  3  -SP      Time 2  20  -SP      Reps 2  3  -SP         Exercise 3    Exercise Name 3  piriformis stretch push away/pulltoward  -SP      Reps 3  3  -SP      Time 3  20  -SP      Reps 3  3  -SP         Exercise 4    Exercise Name 4  PPT  -SP      Reps 4  10  -SP      Time 4  5 sec  -SP      Reps 4  15  -SP         Exercise 5    Exercise Name 5  PPT with ball squeeze  -SP      Reps 5  15  -SP      Time 5  5 sec  -SP      Reps 5  15  -SP         Exercise 6    Exercise Name 6  PPT with BKFO  -SP      Sets 6  2  -SP      Reps 6  10 each  -SP      Time 6  blue tband  -SP      Reps 6  20  -SP         Exercise 7    Exercise Name 7  PPT with ball rolls  -SP      Reps 7  10  -SP      Reps 7  15  -SP         Exercise 8    Exercise Name 8  PPT vs wall  -SP      Reps 8  10  -SP        User Key  (r) = Recorded By, (t) = Taken By, (c) = Cosigned By    Initials Name Provider Type    Priscila Wiggins, VILMA Physical Therapist                        Manual Rx (last 36 hours)      Manual Treatments     Row Name 03/12/19 0918             Manual Rx 1    Manual Rx 1 Location  pelvis  -SP      Manual Rx 1 Type  MET: shotgun and right anterior innominate correct  -SP        User Key  (r) = Recorded By, (t) = Taken By, (c) = Cosigned By    Initials Name Provider Type    Priscila Wiggins  Cuca, PT Physical Therapist                             Time Calculation:   Start Time: 0918  Stop Time: 1018  Time Calculation (min): 60 min  Therapy Suggested Charges     Code   Minutes Charges    None           Therapy Charges for Today     Code Description Service Date Service Provider Modifiers Qty    98457472902 HC PT ELECTRICAL STIM UNATTENDED 3/12/2019 Priscila Gonzalez, PT  1    85967291719 HC PT THER PROC EA 15 MIN 3/12/2019 Priscila Gonzalez, PT GP 1                    Priscila Gonzalez, PT  3/12/2019

## 2019-03-13 ENCOUNTER — HOSPITAL ENCOUNTER (OUTPATIENT)
Dept: PHYSICAL THERAPY | Facility: HOSPITAL | Age: 64
Setting detail: THERAPIES SERIES
Discharge: HOME | End: 2019-03-13

## 2019-03-13 DIAGNOSIS — M51.36 DDD (DEGENERATIVE DISC DISEASE), LUMBAR: Primary | ICD-10-CM

## 2019-03-13 DIAGNOSIS — M54.30 SCIATICA, UNSPECIFIED LATERALITY: ICD-10-CM

## 2019-03-13 DIAGNOSIS — M54.10 RADICULOPATHY WITH LOWER EXTREMITY SYMPTOMS: ICD-10-CM

## 2019-03-13 PROCEDURE — 97110 THERAPEUTIC EXERCISES: CPT | Performed by: PHYSICAL THERAPIST

## 2019-03-18 ENCOUNTER — HOSPITAL ENCOUNTER (OUTPATIENT)
Dept: PHYSICAL THERAPY | Facility: HOSPITAL | Age: 64
Setting detail: THERAPIES SERIES
Discharge: HOME | End: 2019-03-18

## 2019-03-18 DIAGNOSIS — M54.30 SCIATICA, UNSPECIFIED LATERALITY: ICD-10-CM

## 2019-03-18 DIAGNOSIS — M51.36 DDD (DEGENERATIVE DISC DISEASE), LUMBAR: Primary | ICD-10-CM

## 2019-03-18 DIAGNOSIS — M54.10 RADICULOPATHY WITH LOWER EXTREMITY SYMPTOMS: ICD-10-CM

## 2019-03-18 PROCEDURE — G0283 ELEC STIM OTHER THAN WOUND: HCPCS | Performed by: PHYSICAL THERAPIST

## 2019-03-18 PROCEDURE — 97110 THERAPEUTIC EXERCISES: CPT | Performed by: PHYSICAL THERAPIST

## 2019-03-18 NOTE — THERAPY TREATMENT NOTE
Outpatient Physical Therapy Ortho Treatment Note  [facility]ERRY Perez     Patient Name: Celina Barry  : 1955  MRN: [id number]  Today's Date: 3/18/2019      Visit Date: 2019    Visit Dx:    ICD-10-CM ICD-9-CM   1. DDD (degenerative disc disease), lumbar M51.36 722.52   2. Radiculopathy with lower extremity symptoms M54.10 724.4   3. Sciatica, unspecified laterality M54.30 724.3       Patient Active Problem List   Diagnosis   • Urinary frequency   • Acute midline low back pain without sciatica        Past Medical History:   Diagnosis Date   • Allergic    • Osteoarthritis    • Sciatica         Past Surgical History:   Procedure Laterality Date   • SHOULDER SURGERY Right    • SKIN CANCER EXCISION         PT Ortho     Row Name 19 1001       Subjective Comments    Subjective Comments  Patient reports that her MD has recommended that she see a spine specialist.  Patient reports that her knee continues to bother her.  Her back is about the same.  -SP      User Key  (r) = Recorded By, (t) = Taken By, (c) = Cosigned By    Initials Name Provider Type    Priscila Wiggins, PT Physical Therapist                      PT Assessment/Plan     Row Name 19 1338          PT Assessment    Assessment Comments  Patient continues to have back pain and knee pain minimally changed with therapy at this point  -SP       User Key  (r) = Recorded By, (t) = Taken By, (c) = Cosigned By    Initials Name Provider Type    Priscila Wiggins, PT Physical Therapist          Modalities     Row Name 19 1001             Moist Heat    MH Applied  Yes  -SP      Location  (B) L/S area  -SP      Rx Minutes  12 mins  -SP      MH Prior to Rx  Yes  -SP         ELECTRICAL STIMULATION    Attended/Unattended  Unattended  -SP      Stimulation Type  IFC  -SP      Location/Electrode Placement/Other  (B) L/S area  -SP         Other Treatment Provided    Taping / Bracing  kinesiotape:  for left knee support  -SP        User Key   (r) = Recorded By, (t) = Taken By, (c) = Cosigned By    Initials Name Provider Type    SP Priscila Gonzalez, VILMA Physical Therapist          Exercises     Row Name 03/18/19 1001             Subjective Comments    Subjective Comments  Patient reports that her MD has recommended that she see a spine specialist.  Patient reports that her knee continues to bother her.  Her back is about the same.  -SP         Exercise 1    Exercise Name 1  LTR  -SP      Reps 1  3  -SP      Time 1  20 sec  -SP      Reps 1  10  -SP         Exercise 2    Exercise Name 2  SKTC  -SP      Reps 2  3  -SP      Time 2  20  -SP      Reps 2  3  -SP         Exercise 3    Exercise Name 3  piriformis stretch push away/pulltoward  -SP      Reps 3  3  -SP      Time 3  20  -SP      Reps 3  3  -SP         Exercise 4    Exercise Name 4  PPT  -SP      Reps 4  10  -SP      Time 4  5 sec  -SP      Reps 4  15  -SP         Exercise 5    Exercise Name 5  PPT with ball squeeze  -SP      Reps 5  15  -SP      Time 5  5 sec  -SP      Reps 5  15  -SP         Exercise 6    Exercise Name 6  PPT with BKFO  -SP      Sets 6  2  -SP      Reps 6  10 each  -SP      Time 6  blue tband  -SP      Reps 6  20  -SP         Exercise 7    Exercise Name 7  PPT with ball rolls  -SP      Reps 7  10  -SP      Reps 7  15  -SP         Exercise 8    Exercise Name 8  PPT vs wall  -SP      Reps 8  10  -SP        User Key  (r) = Recorded By, (t) = Taken By, (c) = Cosigned By    Initials Name Provider Type    Priscila Wiggins, VILMA Physical Therapist                                            Time Calculation:   Start Time: 1001  Stop Time: 1110  Time Calculation (min): 69 min  Therapy Suggested Charges     Code   Minutes Charges    None           Therapy Charges for Today     Code Description Service Date Service Provider Modifiers Qty    90799037420 HC PT ELECTRICAL STIM UNATTENDED 3/18/2019 Priscila Gonzalez, PT  1    03387382592 HC PT THER PROC EA 15 MIN 3/18/2019  Carlos, Priscila Thurman, PT GP 1                    Priscila Gonzalez, PT  3/18/2019

## 2019-03-20 ENCOUNTER — HOSPITAL ENCOUNTER (OUTPATIENT)
Dept: PHYSICAL THERAPY | Facility: HOSPITAL | Age: 64
Setting detail: THERAPIES SERIES
Discharge: HOME | End: 2019-03-20

## 2019-03-20 DIAGNOSIS — M54.30 SCIATICA, UNSPECIFIED LATERALITY: ICD-10-CM

## 2019-03-20 DIAGNOSIS — M51.36 DDD (DEGENERATIVE DISC DISEASE), LUMBAR: Primary | ICD-10-CM

## 2019-03-20 DIAGNOSIS — M54.10 RADICULOPATHY WITH LOWER EXTREMITY SYMPTOMS: ICD-10-CM

## 2019-03-20 PROCEDURE — G0283 ELEC STIM OTHER THAN WOUND: HCPCS | Performed by: PHYSICAL THERAPIST

## 2019-03-20 PROCEDURE — 97110 THERAPEUTIC EXERCISES: CPT | Performed by: PHYSICAL THERAPIST

## 2019-03-20 NOTE — THERAPY RE-EVALUATION
"    Outpatient Physical Therapy Ortho Re-Evaluation  [place] [place]     Patient Name: Celina Barry  : 1955  MRN: [id number]  Today's Date: 3/20/2019      Visit Date: 2019    Patient Active Problem List   Diagnosis   • Urinary frequency   • Acute midline low back pain without sciatica        Past Medical History:   Diagnosis Date   • Allergic    • Osteoarthritis    • Sciatica         Past Surgical History:   Procedure Laterality Date   • SHOULDER SURGERY Right    • SKIN CANCER EXCISION         Visit Dx:     ICD-10-CM ICD-9-CM   1. DDD (degenerative disc disease), lumbar M51.36 722.52   2. Radiculopathy with lower extremity symptoms M54.10 724.4   3. Sciatica, unspecified laterality M54.30 724.3             PT Ortho     Row Name 19 1000       Subjective Comments    Subjective Comments  Patient reports that her back is feeling better today but she continues to have knee pain.  Patient does not feel like her knee and leg pain is related to her back.   Patient reports that she is continues to have left knee pain and describes feeling unstable on knee at time.  Patient denies any numbness or tingling in LE but continues to report that she feels \"knots on her arms and legs\"    -SP       Posture/Observations    Forward Head  Mild  -SP    Cervical Lordosis  Decreased  -SP    Thoracic Kyphosis  Mild  -SP    Lumbar lordosis  Increased  -SP    Iliac crests  Bilateral:;Normal  -SP    Genu valgus  Bilateral:;Increased  -SP    Foot pronation  Bilateral:;Mild  -SP    Observations  Edema  -SP       Neural Tension Signs- Lower Quarter Clearing    SLR  Bilateral:;Negative  -SP       Sensory Screen for Light Touch- Lower Quarter Clearing    L2 (anterior mid thigh)  Left:;Diminished  -SP    L5 (lateral lower leg/great toe)  Right:;Diminished  -SP       Myotomal Screen- Lower Quarter Clearing    Hip flexion (L2)  Right:;4- (Good -);Left:;4 (Good)  -SP    Knee extension (L3)  4 (Good);4+ (Good +)  -SP    Ankle DF (L4)  " Right:;4 (Good);Left:;4+ (Good +)  -SP    Great toe extension (L5)  Right:;4- (Good -);Left:;4 (Good)  -SP    Ankle PF (S1)  4- (Good -);Right:;Left:;4 (Good)  -SP    Knee flexion (S2)  Right:;4- (Good -);Left:;4 (Good)  -SP       Lumbar/SI Special Tests    SLR (Neural Tension)  Bilateral:;Negative  -SP       Lumbosacral Palpation    SI  Bilateral:;Tender  -SP    Erector Spinae (Paraspinals)  Bilateral:;Tender;Guarded/taut  -SP       Head/Neck/Trunk    Trunk Extension AROM  decreased by 25% from full range  -SP    Trunk Flexion AROM  decreased by 25% from full range  -SP    Trunk Lt Lateral Flexion AROM  decreased by 50% from full range with complaints of pain at end range  -SP    Trunk Rt Lateral Flexion AROM  decreased by 50% from full range  -SP    Trunk Lt Rotation AROM  decreased by 50% from full range  -SP    Trunk Rt Rotation AROM  decreased by 50% from full range  -SP       Sensation    Sensation WNL?  WFL  -SP       Flexibility    Flexibility Tested?  Lower Extremity  -SP       Lower Extremity Flexibility    Hamstrings  Bilateral:;Moderately limited  -SP    Hip Flexors  Bilateral:;Mildly limited  -SP    Quadriceps  Bilateral:;Mildly limited  -SP    Hip External Rotators  Bilateral:;Moderately limited  -SP    Hip Internal Rotators  Bilateral:;Moderately limited  -SP    Gastrocnemius  Bilateral:;Moderately limited  -SP       Transfers    Sit-Stand [place] (Transfers)  independent  -SP       Gait/Stairs Assessment/Training    Deviations/Abnormal Patterns (Gait)  gait speed decreased;chloé decreased  -SP    [place] Level (Stairs)  independent  -SP    Row Name 03/18/19 1001       Subjective Comments    Subjective Comments  Patient reports that her MD has recommended that she see a spine specialist.  Patient reports that her knee continues to bother her.  Her back is about the same.  -SP      User Key  (r) = Recorded By, (t) = Taken By, (c) = Cosigned By    Initials Name Provider Type    ARLINE Gonzalez  Priscila Thurman, PT Physical Therapist                  [unfilled]    Therapy Education  Given: HEP, Posture/body mechanics  Program: Reinforced  How Provided: Verbal, Written  Provided to: Patient  Level of Understanding: Verbalized, Demonstrated     PT OP Goals     Row Name 03/20/19 1000          PT Short Term Goals    STG 1  Patient demonstrates trunk AROM to wfl without complaints of pain at end range  -SP     STG 1 Progress  Ongoing  -SP     STG 2  Patient reports decreased radicular type symptoms into (B) LEs by 25%  -SP     STG 2 Progress  Ongoing  -SP     STG 3  Patient reports she is able to tolerate sit or ride/drive car for 30 min with pain level <3/10 at worst  -SP     STG 3 Progress  Ongoing  -SP        Long Term Goals    LTG 1  Patient reports improved ability to drive or sit for >1 hour with pain level <2/10  -SP     LTG 1 Progress  Ongoing  -SP     LTG 2  Patient demonstrates increased trunk and LE strength by one muscle grade to better tolerate ADLs  -SP     LTG 2 Progress  Ongoing  -SP     LTG 3  Patient independent with HEP  -SP     LTG 3 Progress  Partially Met  -SP        Time Calculation    PT Goal Re-Cert Due Date  04/19/19  -SP       User Key  (r) = Recorded By, (t) = Taken By, (c) = Cosigned By    Initials Name Provider Type    Priscila Wiggins, PT Physical Therapist          PT Assessment/Plan     Row Name 03/20/19 1246          PT Assessment    Assessment Comments  Patient has had fluctuation in symptoms with no consistent overall change in lumbar and hip area pain.  Patient consistently complains of left knee pain that has not changed and continues to be limiting in tolerance for activity.  -SP        PT Plan    PT Frequency  2x/week  -SP     Predicted Duration of Therapy Intervention (Therapy Eval)  3-4 weeks  -SP     PT Plan Comments  Continue per POC  -SP       User Key  (r) = Recorded By, (t) = Taken By, (c) = Cosigned By    Initials Name Provider Type    ARLINE Gonzalez  "Priscila Thurman PT Physical Therapist          Modalities     Row Name 03/20/19 1000             Moist Heat    MH Applied  Yes  -SP      Location  (B) L/S area  -SP      Rx Minutes  12 mins  -SP      MH Prior to Rx  Yes  -SP         ELECTRICAL STIMULATION    Attended/Unattended  Unattended  -SP      Stimulation Type  IFC  -SP      Location/Electrode Placement/Other  (B) L/S area  -SP         Other Treatment Provided    Taping / Bracing  kinesiotape:  for left knee support  -SP        User Key  (r) = Recorded By, (t) = Taken By, (c) = Cosigned By    Initials Name Provider Type    Priscila Wiggins PT Physical Therapist        Exercises     Row Name 03/20/19 1000             Subjective Comments    Subjective Comments  Patient reports that her back is feeling better today but she continues to have knee pain.  Patient does not feel like her knee and leg pain is related to her back.   Patient reports that she is continues to have left knee pain and describes feeling unstable on knee at time.  Patient denies any numbness or tingling in LE but continues to report that she feels \"knots on her arms and legs\"    -SP        User Key  (r) = Recorded By, (t) = Taken By, (c) = Cosigned By    Initials Name Provider Type    Priscila Wiggins, VILMA Physical Therapist                                  Time Calculation:     Start Time: 1010  Stop Time: 1108  Time Calculation (min): 58 min     Therapy Charges for Today     Code Description Service Date Service Provider Modifiers Qty    43214514915 HC PT ELECTRICAL STIM UNATTENDED 3/20/2019 Priscila Gonzalez, PT  1    18353791040 HC PT THER PROC EA 15 MIN 3/20/2019 Priscila Gonzalez, PT GP 1                    Priscila Gonzalez PT  3/20/2019      "

## 2019-03-26 ENCOUNTER — HOSPITAL ENCOUNTER (OUTPATIENT)
Dept: PHYSICAL THERAPY | Facility: HOSPITAL | Age: 64
Setting detail: THERAPIES SERIES
Discharge: HOME | End: 2019-03-26

## 2019-03-26 DIAGNOSIS — M51.36 DDD (DEGENERATIVE DISC DISEASE), LUMBAR: Primary | ICD-10-CM

## 2019-03-26 DIAGNOSIS — M54.30 SCIATICA, UNSPECIFIED LATERALITY: ICD-10-CM

## 2019-03-26 DIAGNOSIS — M54.10 RADICULOPATHY WITH LOWER EXTREMITY SYMPTOMS: ICD-10-CM

## 2019-03-26 PROCEDURE — G0283 ELEC STIM OTHER THAN WOUND: HCPCS | Performed by: PHYSICAL THERAPIST

## 2019-03-26 PROCEDURE — 97110 THERAPEUTIC EXERCISES: CPT | Performed by: PHYSICAL THERAPIST

## 2019-03-26 NOTE — THERAPY TREATMENT NOTE
"    Outpatient Physical Therapy Ortho Treatment Note  [facility]ERRY Perez     Patient Name: Celina Barry  : 1955  MRN: [id number]  Today's Date: 3/26/2019      Visit Date: 2019    Visit Dx:    ICD-10-CM ICD-9-CM   1. DDD (degenerative disc disease), lumbar M51.36 722.52   2. Radiculopathy with lower extremity symptoms M54.10 724.4   3. Sciatica, unspecified laterality M54.30 724.3       Patient Active Problem List   Diagnosis   • Urinary frequency   • Acute midline low back pain without sciatica        Past Medical History:   Diagnosis Date   • Allergic    • Osteoarthritis    • Sciatica         Past Surgical History:   Procedure Laterality Date   • SHOULDER SURGERY Right    • SKIN CANCER EXCISION         PT Ortho     Row Name 19 8579       Subjective Comments    Subjective Comments  Patient reports that she is not doing well and has had increased pain over the last few days.  She reports that she has had \"restless legs\"  throughout the entire day and is in so much pain she has had to get up in the night and take an epsom salt baths.  Patient states she is having pain in her left shoulder and left knee and posterior aspect of left LE.  Patient states she does not have any pain medicine but \"if I had tramadol, I would take all of it\"  Patient reports that she is feeling better today but is frustrated and feels like \"her body is failing me and I don't have anyone, I need to be able to take care of myself\"    Patient is scheduled to see a spine specialist on Friday 3-  -ARLINE      User Key  (r) = Recorded By, (t) = Taken By, (c) = Cosigned By    Initials Name Provider Type    Priscila Wiggins, PT Physical Therapist                      PT Assessment/Plan     Row Name 19 9577          PT Assessment    Assessment Comments  Patient with increased complaints of various and multiple body part, pain.  Patient pain has fluctuated and seems worse with increased weight bearing activity.  Patient has " "had no consistent improvement of symptoms  -SP        PT Plan    PT Plan Comments  Continue with HEP  -SP       User Key  (r) = Recorded By, (t) = Taken By, (c) = Cosigned By    Initials Name Provider Type    Priscila Wiggins, PT Physical Therapist          Modalities     Row Name 03/26/19 1535             Moist Heat    MH Applied  Yes  -KM      Location  (B) L/S area  -KM      Rx Minutes  12 mins  -KM      MH Prior to Rx  Yes  -KM         ELECTRICAL STIMULATION    Attended/Unattended  Unattended  -KM      Stimulation Type  IFC  -KM      Location/Electrode Placement/Other  (B) L/S area  -KM         Other Treatment Provided    Taping / Bracing  no taping today  -KM        User Key  (r) = Recorded By, (t) = Taken By, (c) = Cosigned By    Initials Name Provider Type    Jenifer Tucker, PTA Physical Therapy Assistant        Exercises     Row Name 03/26/19 8286             Subjective Comments    Subjective Comments  Patient reports that she is not doing well and has had increased pain over the last few days.  She reports that she has had \"restless legs\"  throughout the entire day and is in so much pain she has had to get up in the night and take an epsom salt baths.  Patient states she is having pain in her left shoulder and left knee and posterior aspect of left LE.  Patient states she does not have any pain medicine but \"if I had tramadol, I would take all of it\"  Patient reports that she is feeling better today but is frustrated and feels like \"her body is failing me and I don't have anyone, I need to be able to take care of myself\"    Patient is scheduled to see a spine specialist on Friday 3-  -SP         Exercise 1    Exercise Name 1  LTR  -KM      Cueing 1  Tactile;Verbal  -KM      Reps 1  3  -KM      Time 1  20 sec  -KM      Additional Comments  cues to not roll too far  -KM      Reps 1  10  -KM         Exercise 2    Exercise Name 2  SKTC  -KM      Reps 2  3  -KM      Time 2  20  -KM      Reps " 2  3  -KM         Exercise 3    Exercise Name 3  piriformis stretch push away/pulltoward  -KM      Cueing 3  Verbal  -KM      Reps 3  3  -KM      Time 3  20  -KM      Reps 3  3  -KM         Exercise 4    Exercise Name 4  PPT  -KM      Cueing 4  Verbal;Tactile  -KM      Reps 4  15  -KM      Time 4  5 sec  -KM      Reps 4  15  -KM         Exercise 5    Exercise Name 5  PPT with ball squeeze  -KM      Reps 5  15  -KM      Time 5  5 sec  -KM      Additional Comments  pt was performing all PPT ex as bridges-   -KM      Reps 5  15  -KM         Exercise 6    Exercise Name 6  PPT with BKFO  -KM      Sets 6  2  -KM      Reps 6  10 each  -KM      Time 6  blue tband  -KM      Additional Comments  pt was performing ex as a bridge and pushing too far out with legs with ex  -KM      Reps 6  20  -KM         Exercise 7    Exercise Name 7  PPT with ball rolls  -KM      Reps 7  15  -KM         Exercise 8    Exercise Name 8  PPT vs wall  -KM      Reps 8  10  -KM         Exercise 9    Exercise Name 9  i t band stretch  -KM      Cueing 9  Verbal;Tactile  -KM      Reps 9  3  -KM      Time 9  20 seconds  -KM        User Key  (r) = Recorded By, (t) = Taken By, (c) = Cosigned By    Initials Name Provider Type    Jenifer Tucker PTA Physical Therapy Assistant    Priscila Wiggins, PT Physical Therapist                      Manual Rx (last 36 hours)      Manual Treatments     Row Name 03/26/19 0198             Manual Rx 1    Manual Rx 1 Location  pelvis  -SP      Manual Rx 1 Type  MET: shotgun and right anterior innominate correct  -SP        User Key  (r) = Recorded By, (t) = Taken By, (c) = Cosigned By    Initials Name Provider Type    Priscila Wiggins, PT Physical Therapist                             Time Calculation:   Start Time: 1535  Stop Time: 1640  Time Calculation (min): 65 min  Therapy Charges for Today     Code Description Service Date Service Provider Modifiers Qty    20515135526  PT ELECTRICAL STIM  UNATTENDED 3/26/2019 Priscila Gonzalez, PT  1    [id number]  PT THER PROC EA 15 MIN 3/26/2019 Priscila Gonzalez, PT GP 1                    Priscila Gonzalez, PT  3/26/2019

## 2019-03-28 ENCOUNTER — APPOINTMENT (OUTPATIENT)
Dept: PHYSICAL THERAPY | Facility: HOSPITAL | Age: 64
End: 2019-03-28

## 2019-04-02 ENCOUNTER — HOSPITAL ENCOUNTER (OUTPATIENT)
Dept: PHYSICAL THERAPY | Facility: HOSPITAL | Age: 64
Setting detail: THERAPIES SERIES
Discharge: HOME | End: 2019-04-02

## 2019-04-02 DIAGNOSIS — M54.10 RADICULOPATHY WITH LOWER EXTREMITY SYMPTOMS: ICD-10-CM

## 2019-04-02 DIAGNOSIS — M54.30 SCIATICA, UNSPECIFIED LATERALITY: ICD-10-CM

## 2019-04-02 DIAGNOSIS — M51.36 DDD (DEGENERATIVE DISC DISEASE), LUMBAR: Primary | ICD-10-CM

## 2019-04-02 PROCEDURE — 97110 THERAPEUTIC EXERCISES: CPT

## 2019-04-02 PROCEDURE — 97032 APPL MODALITY 1+ESTIM EA 15: CPT

## 2019-04-02 NOTE — THERAPY TREATMENT NOTE
Outpatient Physical Therapy Ortho Treatment Note  [facility]     Patient Name: Celina Barry  : 1955  MRN: [id number]  Today's Date: 2019      Visit Date: 2019    Visit Dx:    ICD-10-CM ICD-9-CM   1. DDD (degenerative disc disease), lumbar M51.36 722.52   2. Radiculopathy with lower extremity symptoms M54.10 724.4   3. Sciatica, unspecified laterality M54.30 724.3       Patient Active Problem List   Diagnosis   • Urinary frequency   • Acute midline low back pain without sciatica        Past Medical History:   Diagnosis Date   • Allergic    • Osteoarthritis    • Sciatica         Past Surgical History:   Procedure Laterality Date   • SHOULDER SURGERY Right    • SKIN CANCER EXCISION         PT Ortho     Row Name 19 1144       Subjective Comments    Subjective Comments  pt reports she saw MD who changed her meds and is feeling better.  No sciatica issues since MD.  Knee issues is being referred to a knee doctor.  Reports sleeping better, moving better, feeling better  -KM       Subjective Pain    Able to rate subjective pain?  yes  -KM    Pre-Treatment Pain Level  4  -KM      User Key  (r) = Recorded By, (t) = Taken By, (c) = Cosigned By    Initials Name Provider Type    Jenifer Tucker PTA Physical Therapy Assistant                      PT Assessment/Plan     Row Name 19 1304 19 1251       PT Assessment    Functional Limitations  --  Impaired gait;Limitation in home management;Performance in work activities  -KM    Impairments  Range of motion;Muscle strength;Pain;Posture  -KM  --    Assessment Comments  -- pt reports overall feeling better with change in meds.  States MD changed two meds- one she says is for depression/nerves and states she is feeling better, sleeping better.  Encouraged pt to perform HEP at least once a day.  education provided on benefit  -KM  --      User Key  (r) = Recorded By, (t) = Taken By, (c) = Cosigned By    Initials Name Provider Type    RADHA Peterson  Jenifer MADRIGAL PTA Physical Therapy Assistant          Modalities     Row Name 04/02/19 1144             Moist Heat    MH Applied  Yes  -KM      Location  (B) L/S area  -KM      Rx Minutes  12 mins  -KM      MH Prior to Rx  Yes  -KM         ELECTRICAL STIMULATION    Attended/Unattended  Unattended  -KM      Stimulation Type  IFC  -KM      Location/Electrode Placement/Other  (B) L/S area with HP  -KM        User Key  (r) = Recorded By, (t) = Taken By, (c) = Cosigned By    Initials Name Provider Type    KM Jenifer Peterson PTA Physical Therapy Assistant        Exercises     Row Name 04/02/19 1306 04/02/19 1144          Subjective Comments    Subjective Comments  --  pt reports she saw MD who changed her meds and is feeling better.  No sciatica issues since MD.  Knee issues is being referred to a knee doctor.  Reports sleeping better, moving better, feeling better  -KM        Subjective Pain    Able to rate subjective pain?  --  yes  -KM     Pre-Treatment Pain Level  --  4  -KM     Subjective Pain Comment  --  pt states she forgot to take pain meds prior to session.  reports she forgot she had heating pad on and went to bed and burned part of breast area left side just redness.  Cautioned pt about using heating pad in bed  -KM        Total Minutes    39433 - PT Therapeutic Exercise Minutes  28  -KM  --        Exercise 1    Exercise Name 1  --  LTR  -KM     Cueing 1  --  Tactile;Verbal  -KM     Reps 1  --  3  -KM     Time 1  --  20 sec  -KM     Additional Comments  --  cues to not roll too far  -KM     Reps 1  --  10  -KM        Exercise 2    Exercise Name 2  --  SKTC  -KM     Reps 2  --  3  -KM     Time 2  --  20  -KM     Reps 2  --  3  -KM        Exercise 3    Exercise Name 3  --  piriformis stretch push away/pulltoward  -KM     Cueing 3  --  Verbal  -KM     Reps 3  --  3  -KM     Time 3  --  20  -KM     Reps 3  --  3  -KM        Exercise 4    Exercise Name 4  --  PPT  -KM     Cueing 4  --  Verbal;Tactile  -KM     Reps 4  --   15  -KM     Time 4  --  5 sec  -KM     Reps 4  --  15  -KM        Exercise 5    Exercise Name 5  --  PPT with ball squeeze  -KM     Cueing 5  --  Verbal;Tactile  -KM     Reps 5  --  15  -KM     Time 5  --  5 sec  -KM     Reps 5  --  15  -KM        Exercise 6    Exercise Name 6  --  PPT with BKFO  -KM     Cueing 6  --  Verbal;Tactile  -KM     Sets 6  --  2  -KM     Reps 6  --  10 each  -KM     Time 6  --  blue tband  -KM     Reps 6  --  20  -KM        Exercise 7    Exercise Name 7  --  PPT with ball rolls  -KM     Reps 7  --  15  -KM        Exercise 8    Exercise Name 8  --  PPT vs wall  -KM     Reps 8  --  10  -KM        Exercise 9    Exercise Name 9  --  i t band stretch  -KM     Cueing 9  --  Verbal;Tactile  -KM     Reps 9  --  3  -KM     Time 9  --  20 seconds  -KM       User Key  (r) = Recorded By, (t) = Taken By, (c) = Cosigned By    Initials Name Provider Type    Jenifer Tucker PTA Physical Therapy Assistant                      Manual Rx (last 36 hours)      Manual Treatments     Row Name 04/02/19 1100             Manual Rx 1    Manual Rx 1 Location  pelvis  -KM      Manual Rx 1 Type  MET: shotgun and right anterior innominate correct per S DELMA Gonzalez PT  -KM        User Key  (r) = Recorded By, (t) = Taken By, (c) = Cosigned By    Initials Name Provider Type    Jenifer Tucker PTA Physical Therapy Assistant        Pt arrived 2 hours early for appointment- was unsure what time her appointment time was today.  Pt reports she has forgotten to take meds and has been a rush all morning.  Encouraged her to perform HEP daily.  Pt still requires cues to perform HEP correctly. Encouraged her to continue HEP and be careful when using heating pad.  Pt feels  Back pain is better after session.  States back pain is a 1-2 after session.        Therapy Education  Education Details: (pt reports she does HEP once in a while- has down part of HEP 2 x since last here.  Education provided on benefits of performing HEP.   Cues to perform ex correctly)  Given: HEP, Symptoms/condition management, Pain management, Posture/body mechanics  Program: Reinforced  How Provided: Verbal, Demonstration(pt reports she has written HEP at home)  Provided to: Patient              Time Calculation:   Start Time: 1144  Stop Time: 1245  Time Calculation (min): 61 min  Therapy Charges for Today     Code Description Service Date Service Provider Modifiers Qty    47268675495  PT THER PROC EA 15 MIN 4/2/2019 Jenifer Peterson, PTA GP 2    36713381013  PT ELEC STIM EA-PER 15 MIN 4/2/2019 Jenifer Peterson, PTA GP 1    [id number]  PT HOT OR COLD PACK TREAT [facility]ARE 4/2/2019 Jenifer Peterson, PTA GP 1                    Jenifer Peterson PTA  4/2/2019

## 2019-04-04 ENCOUNTER — HOSPITAL ENCOUNTER (OUTPATIENT)
Dept: PHYSICAL THERAPY | Facility: HOSPITAL | Age: 64
Setting detail: THERAPIES SERIES
Discharge: HOME | End: 2019-04-04

## 2019-04-04 DIAGNOSIS — M54.30 SCIATICA, UNSPECIFIED LATERALITY: ICD-10-CM

## 2019-04-04 DIAGNOSIS — M51.36 DDD (DEGENERATIVE DISC DISEASE), LUMBAR: Primary | ICD-10-CM

## 2019-04-04 DIAGNOSIS — M54.10 RADICULOPATHY WITH LOWER EXTREMITY SYMPTOMS: ICD-10-CM

## 2019-04-04 PROCEDURE — G0283 ELEC STIM OTHER THAN WOUND: HCPCS | Performed by: PHYSICAL THERAPIST

## 2019-04-04 PROCEDURE — 97110 THERAPEUTIC EXERCISES: CPT | Performed by: PHYSICAL THERAPIST

## 2019-04-04 NOTE — THERAPY TREATMENT NOTE
"    Outpatient Physical Therapy Ortho Treatment Note  [facility]     Patient Name: Celina Barry  : 1955  MRN: [id number]  Today's Date: 2019      Visit Date: 2019    Visit Dx:    ICD-10-CM ICD-9-CM   1. DDD (degenerative disc disease), lumbar M51.36 722.52   2. Radiculopathy with lower extremity symptoms M54.10 724.4   3. Sciatica, unspecified laterality M54.30 724.3       Patient Active Problem List   Diagnosis   • Urinary frequency   • Acute midline low back pain without sciatica        Past Medical History:   Diagnosis Date   • Allergic    • Osteoarthritis    • Sciatica         Past Surgical History:   Procedure Laterality Date   • SHOULDER SURGERY Right    • SKIN CANCER EXCISION         PT Ortho     Row Name 19 1305       Subjective Comments    Subjective Comments  Patient reports that she is feeling much better with recent changes in medication.  She is sleeping better but reports that she had  \"cramps\" in (B) calf muscles last night that woke her.  She states that she is not having as much back pain, but pain is still present.  -SP    Row Name 19 1146       Subjective Comments    Subjective Comments  pt reports she saw MD who changed her meds and is feeling better.  No sciatica issues since MD.  Knee issues is being referred to a knee doctor.  Reports sleeping better, moving better, feeling better  -KM       Subjective Pain    Able to rate subjective pain?  yes  -KM    Pre-Treatment Pain Level  4  -KM      User Key  (r) = Recorded By, (t) = Taken By, (c) = Cosigned By    Initials Name Provider Type    Jenifer Tucker, [place] Physical Therapy Assistant    Priscila Wiggins, PT Physical Therapist                      PT Assessment/Plan     Row Name 19 6740          PT Assessment    Assessment Comments  Patient continues to need cues for exercises.  Overall decreased complaints of pain with change in medications  -SP       User Key  (r) = Recorded By, (t) = Taken By, (c) " "= Cosigned By    Initials Name Provider Type    SP Priscila Gonzalez, PT Physical Therapist          Modalities     Row Name 04/04/19 1305             Moist Heat    MH Applied  Yes  -SP      Location  (B) L/S area  -SP      Rx Minutes  12 mins  -SP      MH Prior to Rx  Yes  -SP         ELECTRICAL STIMULATION    Attended/Unattended  Unattended  -SP      Stimulation Type  IFC  -SP      Location/Electrode Placement/Other  (B) L/S area with HP  -SP        User Key  (r) = Recorded By, (t) = Taken By, (c) = Cosigned By    Initials Name Provider Type    Priscila Wiggins, PT Physical Therapist        Exercises     Row Name 04/04/19 1305             Subjective Comments    Subjective Comments  Patient reports that she is feeling much better with recent changes in medication.  She is sleeping better but reports that she had  \"cramps\" in (B) calf muscles last night that woke her.  She states that she is not having as much back pain, but pain is still present.  -SP         Exercise 1    Exercise Name 1  LTR  -SP      Cueing 1  Tactile;Verbal  -SP      Reps 1  3  -SP      Time 1  20 sec  -SP      Reps 1  10  -SP         Exercise 2    Exercise Name 2  SKTC  -SP      Reps 2  3  -SP      Time 2  20  -SP      Reps 2  3  -SP         Exercise 3    Exercise Name 3  piriformis stretch push away/pulltoward  -SP      Cueing 3  Verbal  -SP      Reps 3  3  -SP      Time 3  20  -SP      Reps 3  3  -SP         Exercise 4    Exercise Name 4  PPT  -SP      Cueing 4  Verbal;Tactile  -SP      Reps 4  15  -SP      Time 4  5 sec  -SP      Reps 4  15  -SP         Exercise 5    Exercise Name 5  PPT with ball squeeze  -SP      Cueing 5  Verbal;Tactile  -SP      Reps 5  15  -SP      Time 5  5 sec  -SP      Reps 5  15  -SP         Exercise 6    Exercise Name 6  PPT with BKFO  -SP      Cueing 6  Verbal;Tactile  -SP      Sets 6  2  -SP      Reps 6  10 each  -SP      Time 6  blue tband  -SP      Reps 6  20  -SP         Exercise 7    " Exercise Name 7  PPT with ball rolls  -SP      Reps 7  15  -SP         Exercise 8    Exercise Name 8  PPT vs wall  -SP      Reps 8  10  -SP         Exercise 9    Exercise Name 9  i t band stretch  -SP      Cueing 9  Verbal;Tactile  -SP      Reps 9  3  -SP      Time 9  20 seconds  -SP        User Key  (r) = Recorded By, (t) = Taken By, (c) = Cosigned By    Initials Name Provider Type    Priscila Wiggins, VILMA Physical Therapist                      Manual Rx (last 36 hours)      Manual Treatments     Row Name 04/04/19 1305             Manual Rx 1    Manual Rx 1 Location  pelvis  -SP      Manual Rx 1 Type  MET: shotgun and right anterior innominate correct per S DELMA Gonzalez PT  -SP        User Key  (r) = Recorded By, (t) = Taken By, (c) = Cosigned By    Initials Name Provider Type    Priscila Wiggins, PT Physical Therapist                             Time Calculation:   Start Time: 1305  Stop Time: 1410  Time Calculation (min): 65 min  Therapy Charges for Today     Code Description Service Date Service Provider Modifiers Qty    52606079237 HC PT ELECTRICAL STIM UNATTENDED 4/4/2019 Priscila Gonzalez, PT  1    33711763531 HC PT THER PROC EA 15 MIN 4/4/2019 Priscila Gonzalez, PT GP 1                    Priscila Gonzalez, PT  4/4/2019

## 2019-04-09 ENCOUNTER — HOSPITAL ENCOUNTER (OUTPATIENT)
Dept: PHYSICAL THERAPY | Facility: HOSPITAL | Age: 64
Setting detail: THERAPIES SERIES
Discharge: HOME | End: 2019-04-09

## 2019-04-09 DIAGNOSIS — M54.10 RADICULOPATHY WITH LOWER EXTREMITY SYMPTOMS: ICD-10-CM

## 2019-04-09 DIAGNOSIS — M51.36 DDD (DEGENERATIVE DISC DISEASE), LUMBAR: Primary | ICD-10-CM

## 2019-04-09 DIAGNOSIS — M54.30 SCIATICA, UNSPECIFIED LATERALITY: ICD-10-CM

## 2019-04-09 PROCEDURE — 97032 APPL MODALITY 1+ESTIM EA 15: CPT

## 2019-04-09 PROCEDURE — 97110 THERAPEUTIC EXERCISES: CPT

## 2019-04-09 NOTE — THERAPY TREATMENT NOTE
Outpatient Physical Therapy Ortho Treatment Note  [facility]     Patient Name: Celina Barry  : 1955  MRN: [id number]  Today's Date: 2019      Visit Date: 2019    Visit Dx:    ICD-10-CM ICD-9-CM   1. DDD (degenerative disc disease), lumbar M51.36 722.52   2. Radiculopathy with lower extremity symptoms M54.10 724.4   3. Sciatica, unspecified laterality M54.30 724.3       Patient Active Problem List   Diagnosis   • Urinary frequency   • Acute midline low back pain without sciatica        Past Medical History:   Diagnosis Date   • Allergic    • Osteoarthritis    • Sciatica         Past Surgical History:   Procedure Laterality Date   • SHOULDER SURGERY Right    • SKIN CANCER EXCISION         PT Ortho     Row Name 19 1415       Subjective Comments    Subjective Comments  Pt reports she is doing better.  Did have soreness  but states she drove an hour in her car and thinks rainy weather also caused pain.  Radiating pain is decreased.  Reports some symptoms in knee   -KM       Subjective Pain    Able to rate subjective pain?  yes  -KM    Pre-Treatment Pain Level  -- not bad  -KM    Post-Treatment Pain Level  -- feels good after session  -KM      User Key  (r) = Recorded By, (t) = Taken By, (c) = Cosigned By    Initials Name Provider Type    Jenifer Tukcer PTA Physical Therapy Assistant                      PT Assessment/Plan     Row Name 19 1521          PT Assessment    Functional Limitations  Limitation in home management;Performance in leisure activities;Performance in work activities  -KM     Impairments  Impaired flexibility;Muscle strength;Pain;Posture;Range of motion  -KM     Assessment Comments  pt reports she has had less symptoms when working.  States she is not havingn radiating symptoms and is overall feeling better.  Tolerated HEP and additions without issue today  -KM       User Key  (r) = Recorded By, (t) = Taken By, (c) = Cosigned By    Initials Name Provider Type     RADHA PetersonJenifer PTA Physical Therapy Assistant          Modalities     Row Name 04/09/19 1415             Moist Heat    MH Applied  Yes  -KM      Location  (B) L/S area  -KM      Rx Minutes  15 mins  -KM      MH Prior to Rx  Yes  -KM         ELECTRICAL STIMULATION    Attended/Unattended  Unattended  -KM      Stimulation Type  IFC  -KM      Location/Electrode Placement/Other  (B) L/S area with HP  -KM        User Key  (r) = Recorded By, (t) = Taken By, (c) = Cosigned By    Initials Name Provider Type    Sharona Tuckermartir MADRIGAL PTA Physical Therapy Assistant        Exercises     Row Name 04/09/19 1522 04/09/19 1415          Subjective Comments    Subjective Comments  --  Pt reports she is doing better.  Did have soreness Sunday but states she drove an hour in her car and thinks rainy weather also caused pain.  Radiating pain is decreased.  Reports some symptoms in knee   -KM        Subjective Pain    Able to rate subjective pain?  --  yes  -KM     Pre-Treatment Pain Level  --  -- not bad  -KM     Post-Treatment Pain Level  --  -- feels good after session  -KM        Total Minutes    77246 - PT Therapeutic Exercise Minutes  31  -KM  --        Exercise 1    Exercise Name 1  --  LTR  -KM     Cueing 1  --  Tactile;Verbal  -KM     Reps 1  --  3  -KM     Time 1  --  20 sec  -KM     Reps 1  --  10  -KM        Exercise 2    Exercise Name 2  --  SKTC  -KM     Reps 2  --  3  -KM     Time 2  --  20  -KM     Reps 2  --  3  -KM        Exercise 3    Exercise Name 3  --  piriformis stretch push away/pulltoward  -KM     Cueing 3  --  Verbal  -KM     Reps 3  --  3  -KM     Time 3  --  20  -KM     Reps 3  --  3  -KM        Exercise 4    Exercise Name 4  --  PPT  -KM     Cueing 4  --  Verbal;Tactile  -KM     Reps 4  --  15  -KM     Time 4  --  5 sec  -KM     Reps 4  --  15  -KM        Exercise 5    Exercise Name 5  --  PPT with ball squeeze  -KM     Cueing 5  --  Verbal;Tactile  -KM     Reps 5  --  15  -KM     Time 5  --  5 sec  -KM      Reps 5  --  15  -KM        Exercise 6    Exercise Name 6  --  PPT with BKFO  -KM     Cueing 6  --  Verbal;Tactile  -KM     Sets 6  --  2  -KM     Reps 6  --  10 each  -KM     Time 6  --  black  tband  -KM     Additional Comments  --  gave black band for HEP  -KM     Reps 6  --  20  -KM        Exercise 7    Exercise Name 7  --  PPT with ball rolls  -KM     Reps 7  --  15  -KM        Exercise 8    Exercise Name 8  --  PPT vs wall  -KM     Reps 8  --  10  -KM     Time 8  --  5 sec hold  -KM     Reps 8  --  10  -KM        Exercise 9    Exercise Name 9  --  i t band stretch  -KM     Cueing 9  --  Verbal;Tactile  -KM     Reps 9  --  3  -KM     Time 9  --  20 seconds  -KM       User Key  (r) = Recorded By, (t) = Taken By, (c) = Cosigned By    Initials Name Provider Type    Jenifer Tucker PTA Physical Therapy Assistant                      Manual Rx (last 36 hours)      Manual Treatments     Row Name 04/09/19 1500             Manual Rx 1    Manual Rx 1 Location  pelvis  -KM      Manual Rx 1 Type  MET: shotgun and right anterior innominate correct by MALCOLM Gonzalez, PT  -KM        User Key  (r) = Recorded By, (t) = Taken By, (c) = Cosigned By    Initials Name Provider Type    Jenifer Tucker PTA Physical Therapy Assistant              Therapy Education  Education Details: (encouraged pt to continue HEP )  Given: HEP, Symptoms/condition management, Posture/body mechanics  Program: Reinforced, Progressed(occasional cues for proper position)  How Provided: Verbal, Demonstration  Provided to: Patient  Level of Understanding: Teach back education performed, Verbalized, Demonstrated        Pt overall improving with mobility and reports she is able to perform work activities with less symptoms.  Encouraged her to continue HEP.  Pt reports she has MD appt for her knee in the next few weeks and returns to back MD in about a month.      Time Calculation:   Start Time: 1415  Stop Time: 1516  Time Calculation (min): 61  min  Therapy Charges for Today     Code Description Service Date Service Provider Modifiers Qty    80701463068 HC PT THER PROC EA 15 MIN 4/9/2019 Jenifer Peterson, PTA GP 2    05900647138 HC PT ELEC STIM EA-PER 15 MIN 4/9/2019 Jenifer Peterson, PTA GP 1    43082073874 HC PT HOT OR COLD PACK TREAT [facility]ARE 4/9/2019 Jenifer Peterson, PTA GP 1                    Jenifer Peterson PTA  4/9/2019

## 2019-04-11 ENCOUNTER — APPOINTMENT (OUTPATIENT)
Dept: PHYSICAL THERAPY | Facility: HOSPITAL | Age: 64
End: 2019-04-11

## 2019-04-16 ENCOUNTER — HOSPITAL ENCOUNTER (OUTPATIENT)
Dept: PHYSICAL THERAPY | Facility: HOSPITAL | Age: 64
Setting detail: THERAPIES SERIES
Discharge: HOME | End: 2019-04-16

## 2019-04-16 DIAGNOSIS — M54.30 SCIATICA, UNSPECIFIED LATERALITY: ICD-10-CM

## 2019-04-16 DIAGNOSIS — M54.10 RADICULOPATHY WITH LOWER EXTREMITY SYMPTOMS: ICD-10-CM

## 2019-04-16 DIAGNOSIS — M51.36 DDD (DEGENERATIVE DISC DISEASE), LUMBAR: Primary | ICD-10-CM

## 2019-04-16 PROCEDURE — 97110 THERAPEUTIC EXERCISES: CPT

## 2019-04-16 PROCEDURE — 97032 APPL MODALITY 1+ESTIM EA 15: CPT

## 2019-04-16 NOTE — THERAPY TREATMENT NOTE
Outpatient Physical Therapy Ortho Treatment Note  [place]     Patient Name: Celina Barry  : 1955  MRN: [id number]  Today's Date: 2019      Visit Date: 2019    Visit Dx:    ICD-10-CM ICD-9-CM   1. DDD (degenerative disc disease), lumbar M51.36 722.52   2. Radiculopathy with lower extremity symptoms M54.10 724.4   3. Sciatica, unspecified laterality M54.30 724.3       Patient Active Problem List   Diagnosis   • Urinary frequency   • Acute midline low back pain without sciatica        Past Medical History:   Diagnosis Date   • Allergic    • Osteoarthritis    • Sciatica         Past Surgical History:   Procedure Laterality Date   • SHOULDER SURGERY Right    • SKIN CANCER EXCISION         PT Ortho     Row Name 19 0131       Subjective Comments    Subjective Comments  pt reports she is sore today- stood on her feet and then had to help her daughter with bringing things home from her dorm.  States she had to load and unload car and carried alot in the house  -KM       Subjective Pain    Able to rate subjective pain?  yes  -KM    Pre-Treatment Pain Level  5  -KM    Post-Treatment Pain Level  3  -KM    Subjective Pain Comment  pt reports feeling better after session.  Plans to go home and relax  -KM      User Key  (r) = Recorded By, (t) = Taken By, (c) = Cosigned By    Initials Name Provider Type    Jenifer Tucker PTA Physical Therapy Assistant                      PT Assessment/Plan     Row Name 19 8405          PT Assessment    Functional Limitations  Impaired gait;Performance in work activities;Limitation in home management;Performance in self-care ADL  -KM     Impairments  Gait;Impaired flexibility;Muscle strength;Pain;Posture  -KM     Assessment Comments  -- pt reports her sciatica is better but reports soreness in hips after prolonged standing and helping her daughter move her stuff back home.  Discussed limiting activities, taking breaks and watching her posture with lifting,  etc.  -KM       User Key  (r) = Recorded By, (t) = Taken By, (c) = Cosigned By    Initials Name Provider Type    Jenifer TuckerALLISON Physical Therapy Assistant          Modalities     Row Name 04/16/19 1536             Moist Heat    MH Applied  Yes  -KM      Location  (B) L/S area  -KM      Rx Minutes  15 mins  -KM      MH Prior to Rx  Yes  -KM         ELECTRICAL STIMULATION    Attended/Unattended  Unattended  -KM      Stimulation Type  IFC  -KM      Location/Electrode Placement/Other  (B) L/S area  -KM        User Key  (r) = Recorded By, (t) = Taken By, (c) = Cosigned By    Initials Name Provider Type    Jenifer TuckerALLISON Physical Therapy Assistant        Exercises     Row Name 04/16/19 1701 04/16/19 1536          Subjective Comments    Subjective Comments  --  pt reports she is sore today- stood on her feet and then had to help her daughter with bringing things home from her dorm.  States she had to load and unload car and carried alot in the house  -KM        Subjective Pain    Able to rate subjective pain?  --  yes  -KM     Pre-Treatment Pain Level  --  5  -KM     Post-Treatment Pain Level  --  3  -KM     Subjective Pain Comment  --  pt reports feeling better after session.  Plans to go home and relax  -KM        Total Minutes    10260 - PT Therapeutic Exercise Minutes  29  -KM  --        Exercise 1    Exercise Name 1  --  LTR  -KM     Cueing 1  --  Tactile;Verbal  -KM     Reps 1  --  3  -KM     Time 1  --  20 sec  -KM     Reps 1  --  10  -KM        Exercise 2    Exercise Name 2  --  SKTC  -KM     Reps 2  --  3  -KM     Time 2  --  20  -KM     Reps 2  --  3  -KM        Exercise 3    Exercise Name 3  --  piriformis stretch push away/pulltoward  -KM     Cueing 3  --  Verbal  -KM     Reps 3  --  3  -KM     Time 3  --  20  -KM     Reps 3  --  3  -KM        Exercise 4    Exercise Name 4  --  PPT  -KM     Cueing 4  --  Verbal;Tactile  -KM     Reps 4  --  15  -KM     Time 4  --  5 sec  -KM     Reps 4  --  15   -KM        Exercise 5    Exercise Name 5  --  PPT with ball squeeze  -KM     Cueing 5  --  Verbal;Tactile  -KM     Reps 5  --  15  -KM     Time 5  --  5 sec  -KM     Reps 5  --  15  -KM        Exercise 6    Exercise Name 6  --  PPT with BKFO  -KM     Cueing 6  --  Verbal;Tactile  -KM     Sets 6  --  2  -KM     Reps 6  --  10 each  -KM     Time 6  --  black  tband  -KM     Reps 6  --  20  -KM        Exercise 7    Exercise Name 7  --  PPT with ball rolls  -KM     Reps 7  --  15  -KM        Exercise 8    Exercise Name 8  --  PPT vs wall  -KM     Reps 8  --  15  -KM     Time 8  --  5 sec hold  -KM     Reps 8  --  10  -KM        Exercise 9    Exercise Name 9  --  i t band stretch  -KM     Cueing 9  --  Verbal;Tactile  -KM     Reps 9  --  3  -KM     Time 9  --  20 seconds  -KM       User Key  (r) = Recorded By, (t) = Taken By, (c) = Cosigned By    Initials Name Provider Type    Jenifer Tucker PTA Physical Therapy Assistant                      Manual Rx (last 36 hours)      Manual Treatments     Row Name 04/16/19 1536             Manual Rx 1    Manual Rx 1 Location  pelvis  -KM      Manual Rx 1 Type  MET: shotgun and right anterior innominate correct by MALCOLM Gonzalez, PT  -KM        User Key  (r) = Recorded By, (t) = Taken By, (c) = Cosigned By    Initials Name Provider Type    Jenifer Tucker PTA Physical Therapy Assistant          Pt requires cues with some ex.  Pt reports feeling better after MET, heat/stim and ex.  Pt states she did not think she was doing that much but then was sore later.  Discussed that prolonged standing and then lifting and carrying items can cause soreness.  Discussed taking breaks and not doing everything at one time.    Therapy Education  Education Details: (encouraged pt to limit activities and to take breaks in between- not try to do everything without rest breaks.  Encouraged her to continue stretches)  Given: HEP, Symptoms/condition management, Posture/body mechanics  Program:  Reinforced, Progressed  How Provided: Verbal, Demonstration  Provided to: Patient  Level of Understanding: Teach back education performed, Verbalized, Demonstrated              Time Calculation:   Start Time: 1535  Stop Time: 1629  Time Calculation (min): 54 min  Therapy Charges for Today     Code Description Service Date Service Provider Modifiers Qty    86534547208 HC PT THER PROC EA 15 MIN 4/16/2019 Jenifer Peterson, ALLISON GP 2    15660251127 HC PT ELEC STIM EA-PER 15 MIN 4/16/2019 Jenifer Peterson, PTA GP 1    10895128355 HC PT HOT OR COLD PACK TREAT [facility] 4/16/2019 Jenifer Peterson, ALLISON GP 1                    Jenifer Peterson PTA  4/16/2019

## 2019-04-17 ENCOUNTER — HOSPITAL ENCOUNTER (OUTPATIENT)
Dept: PHYSICAL THERAPY | Facility: HOSPITAL | Age: 64
Setting detail: THERAPIES SERIES
Discharge: HOME | End: 2019-04-17

## 2019-04-17 DIAGNOSIS — M54.30 SCIATICA, UNSPECIFIED LATERALITY: ICD-10-CM

## 2019-04-17 DIAGNOSIS — M54.10 RADICULOPATHY WITH LOWER EXTREMITY SYMPTOMS: ICD-10-CM

## 2019-04-17 DIAGNOSIS — M51.36 DDD (DEGENERATIVE DISC DISEASE), LUMBAR: Primary | ICD-10-CM

## 2019-04-17 PROCEDURE — 97110 THERAPEUTIC EXERCISES: CPT

## 2019-04-17 PROCEDURE — 97032 APPL MODALITY 1+ESTIM EA 15: CPT

## 2019-04-17 NOTE — THERAPY TREATMENT NOTE
Outpatient Physical Therapy Ortho Treatment Note  [place]     Patient Name: Celina Barry  : 1955  MRN: [id number]  Today's Date: 2019      Visit Date: 2019    Visit Dx:    ICD-10-CM ICD-9-CM   1. DDD (degenerative disc disease), lumbar M51.36 722.52   2. Radiculopathy with lower extremity symptoms M54.10 724.4   3. Sciatica, unspecified laterality M54.30 724.3       Patient Active Problem List   Diagnosis   • Urinary frequency   • Acute midline low back pain without sciatica        Past Medical History:   Diagnosis Date   • Allergic    • Osteoarthritis    • Sciatica         Past Surgical History:   Procedure Laterality Date   • SHOULDER SURGERY Right    • SKIN CANCER EXCISION         PT Ortho     Row Name 19 1500       Subjective Comments    Subjective Comments  pt reports soreness better but still sore  -KM       Subjective Pain    Able to rate subjective pain?  yes  -KM    Pre-Treatment Pain Level  -- 4.5  -KM    Post-Treatment Pain Level  2  -KM    Subjective Pain Comment  pt reports soreness in right hip today.  pt has not done HEP since she was here.  Did not realize she could do stretches again  -KM    Row Name 19 1536       Subjective Comments    Subjective Comments  pt reports she is sore today- stood on her feet and then had to help her daughter with bringing things home from her dorm.  States she had to load and unload car and carried alot in the house  -KM       Subjective Pain    Able to rate subjective pain?  yes  -KM    Pre-Treatment Pain Level  5  -KM    Post-Treatment Pain Level  3  -KM    Subjective Pain Comment  pt reports feeling better after session.  Plans to go home and relax  -KM      User Key  (r) = Recorded By, (t) = Taken By, (c) = Cosigned By    Initials Name Provider Type    Jenifer Tucker PTA Physical Therapy Assistant                      PT Assessment/Plan     Row Name 19 1547 19 8385       PT Assessment    Functional Limitations   Limitations in functional capacity and performance;Performance in work activities;Limitation in home management  -KM  Impaired gait;Performance in work activities;Limitation in home management;Performance in self-care ADL  -KM    Impairments  Gait;Impaired flexibility;Muscle strength;Pain;Posture  -KM  Gait;Impaired flexibility;Muscle strength;Pain;Posture  -KM    Assessment Comments  -- encouraged pt to continue hep and use heat as needed.  She reports feeling better after session.  -KM  -- pt reports her sciatica is better but reports soreness in hips after prolonged standing and helping her daughter move her stuff back home.  Discussed limiting activities, taking breaks and watching her posture with lifting, etc.  -KM      User Key  (r) = Recorded By, (t) = Taken By, (c) = Cosigned By    Initials Name Provider Type    Jenifer Tucker PTA Physical Therapy Assistant          Modalities     Row Name 04/17/19 1500             Moist Heat    MH Applied  Yes  -KM      Location  (B) L/S area  -KM      Rx Minutes  15 mins  -KM      MH Prior to Rx  Yes  -KM         ELECTRICAL STIMULATION    Attended/Unattended  Unattended  -KM      Stimulation Type  IFC  -KM      Location/Electrode Placement/Other  (B) L/S area  -KM      13352 - PT E-Stim Attended (Manual) Minutes  15  -KM        User Key  (r) = Recorded By, (t) = Taken By, (c) = Cosigned By    Initials Name Provider Type    Jenifer Tucker PTA Physical Therapy Assistant        Exercises     Row Name 04/17/19 1609 04/17/19 1500 04/16/19 1701       Subjective Comments    Subjective Comments  --  pt reports soreness better but still sore  -KM  --       Subjective Pain    Able to rate subjective pain?  --  yes  -KM  --    Pre-Treatment Pain Level  --  -- 4.5  -KM  --    Post-Treatment Pain Level  --  2  -KM  --    Subjective Pain Comment  --  pt reports soreness in right hip today.  pt has not done HEP since she was here.  Did not realize she could do stretches again   -KM  --       Total Minutes    31958 - PT Therapeutic Exercise Minutes  26  -KM  --  29  -KM       Exercise 1    Exercise Name 1  --  LTR  -KM  --    Cueing 1  --  Tactile;Verbal  -KM  --    Reps 1  --  3  -KM  --    Time 1  --  20 sec  -KM  --    Reps 1  --  10  -KM  --       Exercise 2    Exercise Name 2  --  SKTC  -KM  --    Reps 2  --  3  -KM  --    Time 2  --  20  -KM  --    Reps 2  --  3  -KM  --       Exercise 3    Exercise Name 3  --  piriformis stretch push away/pulltoward  -KM  --    Cueing 3  --  Verbal  -KM  --    Reps 3  --  3  -KM  --    Time 3  --  20  -KM  --    Reps 3  --  3  -KM  --       Exercise 4    Exercise Name 4  --  PPT  -KM  --    Cueing 4  --  Verbal;Tactile  -KM  --    Reps 4  --  20  -KM  --    Time 4  --  5 sec  -KM  --    Reps 4  --  15  -KM  --       Exercise 5    Exercise Name 5  --  PPT with ball squeeze  -KM  --    Cueing 5  --  Verbal;Tactile  -KM  --    Reps 5  --  15  -KM  --    Time 5  --  5 sec  -KM  --    Reps 5  --  15  -KM  --       Exercise 6    Exercise Name 6  --  PPT with BKFO  -KM  --    Cueing 6  --  Verbal;Tactile  -KM  --    Sets 6  --  1  -KM  --    Reps 6  --  25 each  -KM  --    Time 6  --  black  tband  -KM  --    Reps 6  --  20  -KM  --       Exercise 7    Exercise Name 7  --  PPT with ball rolls  -KM  --    Reps 7  --  15  -KM  --       Exercise 8    Exercise Name 8  --  PPT vs wall  -KM  --    Reps 8  --  15  -KM  --    Time 8  --  5 sec hold  -KM  --    Reps 8  --  10  -KM  --       Exercise 9    Exercise Name 9  --  i t band stretch  -KM  --    Cueing 9  --  Verbal;Tactile  -KM  --    Reps 9  --  3  -KM  --    Time 9  --  20 seconds  -KM  --    Additional Comments  --  cues to perform correctly  -KM  --      User Key  (r) = Recorded By, (t) = Taken By, (c) = Cosigned By    Initials Name Provider Type    Jenifer Tucker, PTA Physical Therapy Assistant                      Manual Rx (last 36 hours)      Manual Treatments     Row Name 04/17/19 1500 04/16/19  1536          Manual Rx 1    Manual Rx 1 Location  pelvis  -KM  pelvis  -KM     Manual Rx 1 Type  MET: shotgun and right anterior innominate correct by VILMA Durand  MET: shotgun and right anterior innominate correct by VILMA Durand       User Key  (r) = Recorded By, (t) = Taken By, (c) = Cosigned By    Initials Name Provider Type    Jenifer Tucker PTA Physical Therapy Assistant        Pt started talking about her daughter and did not realize how much she was tensing up with her body.  Discussed this and discussed lifting rather than bending over with back.  Encouraged pt to continue HEP and use heat as needed.  Encouraged her to monitor stress- she reports she drives with tight  on steering wheel.  Pt has three more visits approved per insurance.  She reports relief after session      Therapy Education  Education Details: (encouraged pt to perform HEP every day- 1-2 times.  she reports soreness last night but did not think to perform ex or use heat to help with pain.  Discussed activities with pt.  Encouraged her to monitor how much she does at a time.)  Given: HEP, Symptoms/condition management, Pain management  Program: Reinforced, Progressed  How Provided: Verbal, Demonstration  Provided to: Patient  Level of Understanding: Teach back education performed, Verbalized, Demonstrated              Time Calculation:   Start Time: 1500  Stop Time: 1559  Time Calculation (min): 59 min  Therapy Charges for Today     Code Description Service Date Service Provider Modifiers Qty    75656395444 HC PT ELEC STIM EA-PER 15 MIN 4/17/2019 Jenifer Peterson, ALLISON GP 1    82585064572 HC PT HOT OR COLD PACK TREAT [facility] 4/17/2019 Jenifer Peterson PTA GP 1    57227330515 HC PT THER PROC EA 15 MIN 4/17/2019 Jenifer Peterson PTA GP 2                    Jenifer Peterson PTA  4/17/2019

## 2019-04-22 ENCOUNTER — HOSPITAL ENCOUNTER (OUTPATIENT)
Dept: PHYSICAL THERAPY | Facility: HOSPITAL | Age: 64
Setting detail: THERAPIES SERIES
Discharge: HOME | End: 2019-04-22

## 2019-04-22 DIAGNOSIS — M54.30 SCIATICA, UNSPECIFIED LATERALITY: ICD-10-CM

## 2019-04-22 DIAGNOSIS — M54.10 RADICULOPATHY WITH LOWER EXTREMITY SYMPTOMS: ICD-10-CM

## 2019-04-22 DIAGNOSIS — M51.36 DDD (DEGENERATIVE DISC DISEASE), LUMBAR: Primary | ICD-10-CM

## 2019-04-22 PROCEDURE — 97032 APPL MODALITY 1+ESTIM EA 15: CPT

## 2019-04-22 PROCEDURE — 97110 THERAPEUTIC EXERCISES: CPT

## 2019-04-23 ENCOUNTER — HOSPITAL ENCOUNTER (OUTPATIENT)
Dept: PHYSICAL THERAPY | Facility: HOSPITAL | Age: 64
Setting detail: THERAPIES SERIES
Discharge: HOME | End: 2019-04-23

## 2019-04-23 DIAGNOSIS — M51.36 DDD (DEGENERATIVE DISC DISEASE), LUMBAR: Primary | ICD-10-CM

## 2019-04-23 DIAGNOSIS — M54.30 SCIATICA, UNSPECIFIED LATERALITY: ICD-10-CM

## 2019-04-23 DIAGNOSIS — M54.10 RADICULOPATHY WITH LOWER EXTREMITY SYMPTOMS: ICD-10-CM

## 2019-04-23 PROCEDURE — 97032 APPL MODALITY 1+ESTIM EA 15: CPT

## 2019-04-23 PROCEDURE — 97110 THERAPEUTIC EXERCISES: CPT

## 2019-04-23 NOTE — THERAPY TREATMENT NOTE
Outpatient Physical Therapy Ortho Treatment Note  [facility]     Patient Name: Celina Barry  : 1955  MRN: [id number]  Today's Date: 2019      Visit Date: 2019    Visit Dx:    ICD-10-CM ICD-9-CM   1. DDD (degenerative disc disease), lumbar M51.36 722.52   2. Radiculopathy with lower extremity symptoms M54.10 724.4   3. Sciatica, unspecified laterality M54.30 724.3       Patient Active Problem List   Diagnosis   • Urinary frequency   • Acute midline low back pain without sciatica        Past Medical History:   Diagnosis Date   • Allergic    • Osteoarthritis    • Sciatica         Past Surgical History:   Procedure Laterality Date   • SHOULDER SURGERY Right    • SKIN CANCER EXCISION         PT Ortho     Row Name 19 0905       Subjective Comments    Subjective Comments  pt reports soreness not as bad.  did stand alot yesterday but did remember to sit and do correct lifting techniques  -KM       Subjective Pain    Able to rate subjective pain?  yes  -KM    Pre-Treatment Pain Level  3  -KM    Post-Treatment Pain Level  -- 1.5  -KM    Subjective Pain Comment  pt reports soreness by the end of the day  -KM    Row Name 19 1306       Subjective Comments    Subjective Comments  pt reports soreness from moving things in her fontaine.    -KM       Subjective Pain    Able to rate subjective pain?  yes  -KM    Pre-Treatment Pain Level  4  -KM    Post-Treatment Pain Level  -- 2-3  -KM    Subjective Pain Comment  reports pain in her back today- was in her legs  yesterday  -KM      User Key  (r) = Recorded By, (t) = Taken By, (c) = Cosigned By    Initials Name Provider Type    Jenifer Tucker PTA Physical Therapy Assistant                      PT Assessment/Plan     Row Name 19 1797          PT Assessment    Functional Limitations  Limitations in functional capacity and performance;Impaired gait;Performance in work activities  -KM     Impairments  Muscle strength;Pain;Poor body mechanics;Posture   -KM     Assessment Comments  -- pt requires cues to stay on task.  States she has Adhd  and has difficulty concentrating.  practiced lifting and body mechanics with pt  -KM       User Key  (r) = Recorded By, (t) = Taken By, (c) = Cosigned By    Initials Name Provider Type    Jenifer Tucker PTA Physical Therapy Assistant          Modalities     Row Name 04/23/19 0905 04/22/19 1306          Moist Heat    MH Applied  Yes  -KM  Yes  -KM     Location  (B) L/S area  -KM  (B) L/S area  -KM     Rx Minutes  15 mins  -KM  15 mins  -KM     MH Prior to Rx  Yes  -KM  Yes  -KM        ELECTRICAL STIMULATION    Attended/Unattended  Unattended  -KM  Unattended  -KM     Stimulation Type  IFC  -KM  IFC  -KM     Location/Electrode Placement/Other  (B) L/S area  -KM  (B) L/S area  -KM     03010 - PT E-Stim Attended (Manual) Minutes  15  -KM  15  -KM       User Key  (r) = Recorded By, (t) = Taken By, (c) = Cosigned By    Initials Name Provider Type    Jenifer Tucker PTA Physical Therapy Assistant        Exercises     Row Name 04/23/19 1027 04/23/19 0905 04/22/19 1653       Subjective Comments    Subjective Comments  --  pt reports soreness not as bad.  did stand alot yesterday but did remember to sit and do correct lifting techniques  -KM  --       Subjective Pain    Able to rate subjective pain?  --  yes  -KM  --    Pre-Treatment Pain Level  --  3  -KM  --    Post-Treatment Pain Level  --  -- 1.5  -KM  --    Subjective Pain Comment  --  pt reports soreness by the end of the day  -KM  --       Total Minutes    45287 - PT Therapeutic Exercise Minutes  27  -KM  --  37  -KM       Exercise 1    Exercise Name 1  --  LTR  -KM  --    Cueing 1  --  Tactile;Verbal  -KM  --    Reps 1  --  3  -KM  --    Time 1  --  20 sec  -KM  --    Reps 1  --  10  -KM  --       Exercise 2    Exercise Name 2  --  SKTC  -KM  --    Reps 2  --  3  -KM  --    Time 2  --  20  -KM  --    Reps 2  --  3  -KM  --       Exercise 3    Exercise Name 3  --  piriformis  stretch push away/pulltoward  -KM  --    Cueing 3  --  Verbal  -KM  --    Reps 3  --  3  -KM  --    Time 3  --  20  -KM  --    Reps 3  --  3  -KM  --       Exercise 4    Exercise Name 4  --  PPT  -KM  --    Cueing 4  --  Verbal;Tactile  -KM  --    Reps 4  --  20  -KM  --    Time 4  --  5 sec  -KM  --    Reps 4  --  15  -KM  --       Exercise 5    Exercise Name 5  --  PPT with ball squeeze  -KM  --    Cueing 5  --  Verbal;Tactile  -KM  --    Reps 5  --  20  -KM  --    Time 5  --  5 sec  -KM  --    Reps 5  --  15  -KM  --       Exercise 6    Exercise Name 6  --  PPT with BKFO  -KM  --    Cueing 6  --  Verbal;Tactile  -KM  --    Sets 6  --  1  -KM  --    Reps 6  --  25 each  -KM  --    Time 6  --  black  tband  -KM  --    Reps 6  --  20  -KM  --       Exercise 7    Exercise Name 7  --  PPT with ball rolls  -KM  --    Reps 7  --  15  -KM  --       Exercise 8    Exercise Name 8  --  PPT vs wall  -KM  --    Cueing 8  --  Verbal;Tactile  -KM  --    Reps 8  --  20  -KM  --    Time 8  --  5 sec hold  -KM  --    Reps 8  --  10  -KM  --       Exercise 9    Exercise Name 9  --  i t band stretch  -KM  --    Cueing 9  --  Verbal;Tactile  -KM  --    Reps 9  --  3  -KM  --    Time 9  --  20 seconds  -KM  --    Row Name 04/22/19 1306             Subjective Comments    Subjective Comments  pt reports soreness from moving things in her fontaine.    -KM         Subjective Pain    Able to rate subjective pain?  yes  -KM      Pre-Treatment Pain Level  4  -KM      Post-Treatment Pain Level  -- 2-3  -KM      Subjective Pain Comment  reports pain in her back today- was in her legs  yesterday  -KM         Exercise 1    Exercise Name 1  LTR  -KM      Cueing 1  Tactile;Verbal  -KM      Reps 1  3  -KM      Time 1  20 sec  -KM      Reps 1  10  -KM         Exercise 2    Exercise Name 2  SKTC  -KM      Reps 2  3  -KM      Time 2  20  -KM      Reps 2  3  -KM         Exercise 3    Exercise Name 3  piriformis stretch push away/pulltoward  -KM       Cueing 3  Verbal  -KM      Reps 3  3  -KM      Time 3  20  -KM      Reps 3  3  -KM         Exercise 4    Exercise Name 4  PPT  -KM      Cueing 4  Verbal;Tactile  -KM      Reps 4  20  -KM      Time 4  5 sec  -KM      Reps 4  15  -KM         Exercise 5    Exercise Name 5  PPT with ball squeeze  -KM      Cueing 5  Verbal;Tactile  -KM      Reps 5  20  -KM      Time 5  5 sec  -KM      Reps 5  15  -KM         Exercise 6    Exercise Name 6  PPT with BKFO  -KM      Cueing 6  Verbal;Tactile  -KM      Sets 6  1  -KM      Reps 6  25 each  -KM      Time 6  black  tband  -KM      Reps 6  20  -KM         Exercise 7    Exercise Name 7  PPT with ball rolls  -KM      Reps 7  15  -KM         Exercise 8    Exercise Name 8  PPT vs wall  -KM      Cueing 8  Verbal;Tactile  -KM      Reps 8  15  -KM      Time 8  5 sec hold  -KM      Reps 8  10  -KM         Exercise 9    Exercise Name 9  i t band stretch  -KM      Cueing 9  Verbal;Tactile  -KM      Reps 9  3  -KM      Time 9  20 seconds  -KM        User Key  (r) = Recorded By, (t) = Taken By, (c) = Cosigned By    Initials Name Provider Type    Jenifer Tucker PTA Physical Therapy Assistant                      Manual Rx (last 36 hours)      Manual Treatments     Row Name 04/23/19 0905 04/22/19 1306          Manual Rx 1    Manual Rx 1 Location  pelvis  -KM  pelvis  -KM     Manual Rx 1 Type  MET: shotgun and right anterior innominate correct by MALCOLM Gonzalez, PT  -KM  MET: shotgun and right anterior innominate correct by MALCOLM Gonzalez, PT  -KM       User Key  (r) = Recorded By, (t) = Taken By, (c) = Cosigned By    Initials Name Provider Type    Jenifer Tucker PTA Physical Therapy Assistant        Pt states practicing lifting yesterday did help her to remember and use techniques when lifting items yesterday.  Pt has one more visit approved per insurance and then will have to see if anymore are approved.      Therapy Education  Education Details: (encouraged pt to continue HEP and to  watch her positions/lifting technique.  Does better if she  is focused on what she is doing.  Discussed limiting help with moving daughter out off dorm...)  Given: HEP, Symptoms/condition management, Pain management, Posture/body mechanics  Program: Reinforced  How Provided: Demonstration  Provided to: Patient  Level of Understanding: Teach back education performed, Demonstrated              Time Calculation:   Start Time: 0905  Stop Time: 1001  Time Calculation (min): 56 min  Therapy Charges for Today     Code Description Service Date Service Provider Modifiers Qty    10074143963 HC PT THER PROC EA 15 MIN 4/23/2019 Jenifer Peterson, PTA GP 2    13952502615 HC PT ELEC STIM EA-PER 15 MIN 4/23/2019 Jenifer Peterson, PTA GP 1    10553619683 HC PT HOT OR COLD PACK TREAT [facility] 4/23/2019 Jenifer Peterson, PTA GP 1                    Jenifer Peterson PTA  4/23/2019

## 2019-05-01 ENCOUNTER — APPOINTMENT (OUTPATIENT)
Dept: PHYSICAL THERAPY | Facility: HOSPITAL | Age: 64
End: 2019-05-01

## 2019-05-07 ENCOUNTER — APPOINTMENT (OUTPATIENT)
Dept: PHYSICAL THERAPY | Facility: HOSPITAL | Age: 64
End: 2019-05-07

## 2019-05-14 ENCOUNTER — HOSPITAL ENCOUNTER (OUTPATIENT)
Dept: PHYSICAL THERAPY | Facility: HOSPITAL | Age: 64
Setting detail: THERAPIES SERIES
Discharge: HOME | End: 2019-05-14

## 2019-05-14 DIAGNOSIS — M54.10 RADICULOPATHY WITH LOWER EXTREMITY SYMPTOMS: ICD-10-CM

## 2019-05-14 DIAGNOSIS — M54.30 SCIATICA, UNSPECIFIED LATERALITY: ICD-10-CM

## 2019-05-14 DIAGNOSIS — M51.36 DDD (DEGENERATIVE DISC DISEASE), LUMBAR: Primary | ICD-10-CM

## 2019-05-14 PROCEDURE — G0283 ELEC STIM OTHER THAN WOUND: HCPCS | Performed by: PHYSICAL THERAPIST

## 2019-05-14 PROCEDURE — 97110 THERAPEUTIC EXERCISES: CPT | Performed by: PHYSICAL THERAPIST

## 2019-05-14 NOTE — THERAPY TREATMENT NOTE
"    Outpatient Physical Therapy Ortho Treatment Note   GERRY Perez     Patient Name: Celina Barry  : 1955  MRN: [id number]  Today's Date: 2019      Visit Date: 2019    Visit Dx:    ICD-10-CM ICD-9-CM   1. DDD (degenerative disc disease), lumbar M51.36 722.52   2. Radiculopathy with lower extremity symptoms M54.10 724.4   3. Sciatica, unspecified laterality M54.30 724.3       Patient Active Problem List   Diagnosis   • Urinary frequency   • Acute midline low back pain without sciatica        Past Medical History:   Diagnosis Date   • Allergic    • Osteoarthritis    • Sciatica         Past Surgical History:   Procedure Laterality Date   • SHOULDER SURGERY Right    • SKIN CANCER EXCISION         PT Ortho     Row Name 19 0905       Subjective Comments    Subjective Comments  Patient reports that she has made overall improvement in symptoms since initiation of therapy.  However, she goes on to reports that her (B) leg pain/symptoms is unchanged and she continues to have pain and \"knots\" along each thigh.  She reports that she has most pain in left medial knee.  Patient has new complaint of numbness that she states is constant in her toe, but cannot recall which toe, she states she thinks it is her right.  Patient reports that she is trying to be more cautious about she is moving and lifting.  She reports that her daughter is home from college and she has been helping so that patient does not have to do as much lifting and bending.    -SP       Posture/Observations    Forward Head  Mild  -SP    Cervical Lordosis  Decreased  -SP    Thoracic Kyphosis  Mild  -SP    Lumbar lordosis  Increased  -SP    Iliac crests  Bilateral:;Normal  -SP    Genu valgus  Bilateral:;Increased  -SP    Foot pronation  Bilateral:;Mild  -SP       Neural Tension Signs- Lower Quarter Clearing    SLR  Bilateral:;Negative  -SP       Sensory Screen for Light Touch- Lower Quarter Clearing    L2 (anterior mid thigh)  Left:;Diminished  " -SP    L5 (lateral lower leg/great toe)  Right:;Diminished  -SP       Myotomal Screen- Lower Quarter Clearing    Hip flexion (L2)  4 (Good);Bilateral:  -SP    Knee extension (L3)  4+ (Good +);Bilateral:  -SP    Ankle DF (L4)  4+ (Good +);Bilateral:  -SP    Great toe extension (L5)  Right:;Left:;4+ (Good +)  -SP    Ankle PF (S1)  4 (Good);Bilateral:  -SP    Knee flexion (S2)  4 (Good);Bilateral:  -SP       Lumbar/SI Special Tests    SLR (Neural Tension)  Bilateral:;Negative  -SP       Lumbosacral Palpation    SI  Bilateral:;Tender  -SP    Erector Spinae (Paraspinals)  Bilateral:;Tender;Guarded/taut  -SP       Head/Neck/Trunk    Trunk Extension AROM  wfl   -SP    Trunk Flexion AROM  wfl with complaints of tightness in (B) hamstrings  -SP    Trunk Lt Lateral Flexion AROM  decreased by 50% from full range with complaints of pain at end  -SP    Trunk Rt Lateral Flexion AROM  decreased by 50% from full range  -SP    Trunk Lt Rotation AROM  decreased by 50% from full range   -SP    Trunk Rt Rotation AROM  decreased by 50% from full range  -SP       Sensation    Sensation WNL?  WFL  -SP       Flexibility    Flexibility Tested?  Lower Extremity  -SP       Lower Extremity Flexibility    Hamstrings  Bilateral:;Mildly limited  -SP    Hip Flexors  Bilateral:;Mildly limited  -SP    Quadriceps  Bilateral:;Mildly limited  -SP    Hip External Rotators  Bilateral:;Mildly limited  -SP    Hip Internal Rotators  Bilateral:;Mildly limited  -SP    Gastrocnemius  Bilateral:;Mildly limited  -SP       Transfers    Sit-Stand Mercer (Transfers)  independent  -SP       Gait/Stairs Assessment/Training    Mercer Level (Stairs)  independent  -SP      User Key  (r) = Recorded By, (t) = Taken By, (c) = Cosigned By    Initials Name Provider Type    Priscila Wiggins, PT Physical Therapist                      PT Assessment/Plan     Row Name 05/14/19 4672          PT Assessment    Assessment Comments  Patient reports that she has  "made some improvement in symptoms since initiation of treatment however she continues to complains of pain in (B) LEs.  Patient has been seen for 15 visits to address her low back and LE symptoms.  She has been treated for this multiple times in the past and has shown minimal overall change in symptoms.   -SP        PT Plan    PT Plan Comments  Recommend discontinue therapy at this time.  -SP       User Key  (r) = Recorded By, (t) = Taken By, (c) = Cosigned By    Initials Name Provider Type    Priscila Wiggins, PT Physical Therapist          Modalities     Row Name 05/14/19 0905             Moist Heat    MH Applied  Yes with IFC  -SP      Location  L/S area  -SP      Rx Minutes  15 mins  -SP      MH Prior to Rx  Yes  -SP         ELECTRICAL STIMULATION    Attended/Unattended  Unattended  -SP      Stimulation Type  IFC  -SP      Location/Electrode Placement/Other  (B) L/S area  -SP        User Key  (r) = Recorded By, (t) = Taken By, (c) = Cosigned By    Initials Name Provider Type    Priscila Wiggins, PT Physical Therapist        Exercises     Row Name 05/14/19 0905             Subjective Comments    Subjective Comments  Patient reports that she has made overall improvement in symptoms since initiation of therapy.  However, she goes on to reports that her (B) leg pain/symptoms is unchanged and she continues to have pain and \"knots\" along each thigh.  She reports that she has most pain in left medial knee.  Patient has new complaint of numbness that she states is constant in her toe, but cannot recall which toe, she states she thinks it is her right.  Patient reports that she is trying to be more cautious about she is moving and lifting.  She reports that her daughter is home from college and she has been helping so that patient does not have to do as much lifting and bending.    -SP         Exercise 1    Exercise Name 1  LTR  -SP      Cueing 1  Tactile;Verbal  -SP      Reps 1  3  -SP      Time 1  " 20 sec  -SP      Reps 1  10  -SP         Exercise 2    Exercise Name 2  SKTC  -SP      Reps 2  3  -SP      Time 2  20  -SP      Reps 2  3  -SP         Exercise 3    Exercise Name 3  piriformis stretch push away/pulltoward  -SP      Cueing 3  Verbal  -SP      Reps 3  3  -SP      Time 3  20  -SP      Reps 3  3  -SP         Exercise 4    Exercise Name 4  PPT  -SP      Cueing 4  Verbal;Tactile  -SP      Reps 4  20  -SP      Time 4  5 sec  -SP      Reps 4  15  -SP         Exercise 5    Exercise Name 5  PPT with ball squeeze  -SP      Cueing 5  Verbal;Tactile  -SP      Reps 5  20  -SP      Time 5  5 sec  -SP      Reps 5  15  -SP         Exercise 6    Exercise Name 6  PPT with BKFO  -SP      Cueing 6  Verbal;Tactile  -SP      Sets 6  1  -SP      Reps 6  25 each  -SP      Time 6  black  tband  -SP      Reps 6  20  -SP         Exercise 7    Exercise Name 7  PPT with ball rolls  -SP      Reps 7  15  -SP         Exercise 8    Exercise Name 8  PPT vs wall  -SP      Cueing 8  Verbal;Tactile  -SP      Reps 8  20  -SP      Time 8  5 sec hold  -SP      Reps 8  10  -SP         Exercise 9    Exercise Name 9  i t band stretch  -SP      Cueing 9  Verbal;Tactile  -SP      Reps 9  3  -SP      Time 9  20 seconds  -SP        User Key  (r) = Recorded By, (t) = Taken By, (c) = Cosigned By    Initials Name Provider Type    Priscila Wiggins PT Physical Therapist                      Manual Rx (last 36 hours)      Manual Treatments     Row Name 05/14/19 0905             Manual Rx 1    Manual Rx 1 Location  pelvis  -SP      Manual Rx 1 Type  MET: shotgun and right anterior innominate correct by MALCOLM Gonzalez PT  -SP        User Key  (r) = Recorded By, (t) = Taken By, (c) = Cosigned By    Initials Name Provider Type    Priscila Wiggins PT Physical Therapist          PT OP Goals     Row Name 05/14/19 0904          PT Short Term Goals    STG 1  Patient demonstrates trunk AROM to wfl without complaints of pain at end range   -SP     STG 1 Progress  Not Met progress made  -SP     STG 2  Patient reports decreased radicular type symptoms into (B) LEs by 25%  -SP     STG 2 Progress  Not Met patient reports that LE symptoms continue  -SP     STG 3  Patient reports she is able to tolerate sit or ride/drive car for 30 min with pain level <3/10 at worst  -SP     STG 3 Progress  Not Met  -SP        Long Term Goals    LTG 1  Patient reports improved ability to drive or sit for >1 hour with pain level <2/10  -SP     LTG 1 Progress  Not Met  -SP     LTG 2  Patient demonstrates increased trunk and LE strength by one muscle grade to better tolerate ADLs  -SP     LTG 2 Progress  Partially Met  -SP     LTG 3  Patient independent with HEP  -SP     LTG 3 Progress  Met  -SP       User Key  (r) = Recorded By, (t) = Taken By, (c) = Cosigned By    Initials Name Provider Type    Priscila Wiggins, PT Physical Therapist                         Time Calculation:   Start Time: 0904  Stop Time: 1008  Time Calculation (min): 64 min  Therapy Charges for Today     Code Description Service Date Service Provider Modifiers Qty    67274818817 HC PT ELECTRICAL STIM UNATTENDED 5/14/2019 Priscila Gonzalez, PT  1    13223193484 HC PT THER PROC EA 15 MIN 5/14/2019 Priscila Gonzalez, PT GP 1                    Priscila Gonzalez, PT  5/14/2019

## 2019-09-09 ENCOUNTER — HOSPITAL ENCOUNTER (OUTPATIENT)
Dept: PHYSICAL THERAPY | Facility: HOSPITAL | Age: 64
Setting detail: THERAPIES SERIES
Discharge: HOME | End: 2019-09-09

## 2019-09-09 DIAGNOSIS — M54.41 RIGHT-SIDED LOW BACK PAIN WITH RIGHT-SIDED SCIATICA, UNSPECIFIED CHRONICITY: ICD-10-CM

## 2019-09-09 DIAGNOSIS — M54.30 SCIATICA, UNSPECIFIED LATERALITY: ICD-10-CM

## 2019-09-09 DIAGNOSIS — M54.10 RADICULOPATHY WITH LOWER EXTREMITY SYMPTOMS: Primary | ICD-10-CM

## 2019-09-09 PROCEDURE — 97162 PT EVAL MOD COMPLEX 30 MIN: CPT | Performed by: PHYSICAL THERAPIST

## 2019-09-09 NOTE — THERAPY EVALUATION
"    Outpatient Physical Therapy Ortho Initial Evaluation  GERRY Delaplaine     Patient Name: Celina Barry  : 1955  MRN: [id number]  Today's Date: 2019      Visit Date: 2019    Patient Active Problem List   Diagnosis   • Urinary frequency   • Acute midline low back pain without sciatica        Past Medical History:   Diagnosis Date   • Allergic    • Osteoarthritis    • Sciatica         Past Surgical History:   Procedure Laterality Date   • SHOULDER SURGERY Right    • SKIN CANCER EXCISION         Visit Dx:     ICD-10-CM ICD-9-CM   1. Radiculopathy with lower extremity symptoms M54.10 724.4   2. Sciatica, unspecified laterality M54.30 724.3   3. Right-sided low back pain with right-sided sciatica, unspecified chronicity M54.41 724.3         Patient History     Row Name 19 0800             History    Chief Complaint  Pain;Difficulty with daily activities  -SP      Type of Pain  Back pain;Lower Extremity / Leg  -SP      Date Current Problem(s) Began  -- 3-4 weeks ago  -SP      Brief Description of Current Complaint  Patient with chronic history of back and sciatic type pain for several years.  She has had multiple rounds of PT.  Patient states she had been doing well following last round of PT.  She reports that her back \"went out\" approximately 3-4 weeks ago when lifting objects.  Patient reports she tried stretching and rest but symptoms have continued.  Patient was referred to a chiropractor and had some type of traction and manipulation.  She states she is much better, but continues to have low back area pain and \"weakness\"  Patient is complaining of left knee pain for which she was prescribed steroids.  She does not have orders to treat left knee.  Patient reports she is also having pain in right lateral thigh, but denies numbness/tingling.     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   -SP      How has patient tried to help current problem?  rest, warm bath,meds  -SP      What clinical tests have you had for this problem?  -- no recent testing  -SP         Pain     Pain Location  Back;Knee;Leg  -SP      Pain at Present  7;8  -SP      Pain at Best  5  -SP      Pain at Worst  8  -SP      Pain Frequency  Constant/continuous  -SP      Pain Description  Aching;Tightness stiffness  -SP      What Performance Factors Make the Current Problem(s) WORSE?  walking, lifting, prolonged sitting, standing  -SP      What Performance Factors Make the Current Problem(s) BETTER?  meds, lying down  -SP      Tolerance Time- Standing  pain within a few minutes of standing  -SP      Tolerance Time- Sitting  30-60 min now; could not sit   -SP      Tolerance Time- Walking  limited, feels like she needs to hold on to something  -SP      Is your sleep disturbed?  Yes  -SP      What position do you sleep in?  -- moves frequently  -SP      Difficulties at work?  limited tolerance for any bend lifting  -SP      Difficulties with ADL's?  limited tolerance for stand, sit, walking  -SP         Fall Risk Assessment    Any falls in the past year:  No  -SP         Daily Activities    Primary Language  English  -SP      Are you able to read  Yes  -SP      Are you able to write  Yes  -SP      How does patient learn best?  Listening;Reading  -SP      Teaching needs identified  Home Exercise Program;Management of Condition  -SP      Barriers to learning  None  -SP      Pt Participated in POC and Goals  Yes  -SP         Safety    Are you being hurt, hit, or frightened by anyone at home or in your life?  No  -SP      Are you being neglected by a caregiver  No  -SP        User Key  (r) = Recorded By, (t) = Taken By, (c) = Cosigned By    Initials Name Provider Type    SP Priscila Gonzalez, PT  Physical Therapist          PT Ortho     Row Name 09/09/19 0800       Posture/Observations    Lumbar lordosis  Increased  -SP    Iliac crests  Bilateral:;Normal  -SP    Posture/Observations Comments  bialteral genu valgus; appears to have right anterior innominate  -SP       Neural Tension Signs- Lower Quarter Clearing    SLR  Bilateral:;Negative  -SP       Sensory Screen for Light Touch- Lower Quarter Clearing    L1 (inguinal area)  Bilateral:;Intact  -SP    L2 (anterior mid thigh)  Bilateral:;Intact  -SP    L3 (distal anterior thigh)  Bilateral:;Intact  -SP    L4 (medial lower leg/foot)  Bilateral:;Intact  -SP    L5 (lateral lower leg/great toe)  Bilateral:;Intact  -SP    S1 (bottom of foot)  Bilateral:;Intact  -SP       Myotomal Screen- Lower Quarter Clearing    Hip flexion (L2)  Right:;3 (Fair);Left:;4- (Good -)  -SP    Knee extension (L3)  Right:;3+ (Fair +);Left:;4- (Good -)  -SP    Ankle DF (L4)  Right:;4- (Good -);Left:;4 (Good)  -SP    Great toe extension (L5)  Right:;4- (Good -);Left:;4 (Good)  -SP    Ankle PF (S1)  Right:;3 (Fair);Left:;3+ (Fair +) patient unable to heel raise bilaterally  -SP    Knee flexion (S2)  Right:;4- (Good -);Left:;4 (Good)  -SP       Lumbar/SI Special Tests    Standing Flexion Test (SI Dysfunction)  Right:;Positive  -SP    SLR (Neural Tension)  Bilateral:;Negative  -SP       Lumbosacral Palpation    SI  Right:;Tender  -SP    Erector Spinae (Paraspinals)  Bilateral:;Tender;Guarded/taut  -SP       General ROM    GENERAL ROM COMMENTS  (B) LE AROM wfl with complaints of pain at end ranges of hip flexion and left knee ROM pain  -SP       Head/Neck/Trunk    Trunk Extension AROM  decreased by 25% with low back pain  -SP    Trunk Flexion AROM  decreased by 50% from full range with complaints of tightness at end range  -SP    Trunk Lt Lateral Flexion AROM  decreased by 50% from full range with reports of tightness in low back  -SP    Trunk Rt Lateral Flexion AROM  decreased by 50% from  full range with complaints of pain in right L/S area  -SP    Trunk Lt Rotation AROM  decreased by 50% from full range  -SP    Trunk Rt Rotation AROM  decreased by 50% from full range  -SP       MMT Neck/Trunk    Trunk Flexion MMT, Gross Movement  (2/5) poor  -SP    Left Pelvic Elevation MMT, Gross Movement  (2-/5) poor minus  -SP    Right Pelvic Elevation MMT, Gross Movement:  (2-/5) poor minus  -SP       Lower Extremity Flexibility    Hamstrings  Bilateral:;Moderately limited  -SP    Hip Flexors  Bilateral:;Moderately limited  -SP    Hip External Rotators  Bilateral:;Moderately limited  -SP       Balance Skills Training    SLS  unable to SLS without UE support  -SP       Transfers    Sit-Stand Randle (Transfers)  independent  -SP    Stand-Sit Randle (Transfers)  independent  -SP    Comment (Transfers)  uses UEs to assist with transfers sit to stand  -SP       Gait/Stairs Assessment/Training    Randle Level (Gait)  independent  -SP    Deviations/Abnormal Patterns (Gait)  chloé decreased;stride length decreased  -SP      User Key  (r) = Recorded By, (t) = Taken By, (c) = Cosigned By    Initials Name Provider Type    Priscila Wiggins, PT Physical Therapist                      Therapy Education  Given: HEP  Program: New  How Provided: Verbal, Written  Provided to: Patient  Level of Understanding: Verbalized, Demonstrated     PT OP Goals     Row Name 09/09/19 0800          PT Short Term Goals    STG Date to Achieve  09/24/19  -SP     STG 1  Patient reports decreased radicular type symptoms into right hip/LE by 25%  -SP     STG 2  Patient instructed in and demonstrates apppropriate technique with lifting and carrying activity   -SP     STG 3  Patient reports improved ability to tolerate stand and walk > 30 min with pain level   -SP        Long Term Goals    LTG Date to Achieve  10/09/19  -SP     LTG 1  Patient demonstrates increased trunk and LE strength by one muscle grade to better  tolerate ADLs  -SP     LTG 2  Patient demonstrates appropriate mechanics with lift floor to waist <5 lbs without complaints of increased pain  -SP     LTG 3  Patient independent with HEP  -SP        Time Calculation    PT Goal Re-Cert Due Date  10/09/19  -SP       User Key  (r) = Recorded By, (t) = Taken By, (c) = Cosigned By    Initials Name Provider Type    SP Priscila Gonzalez, PT Physical Therapist          PT Assessment/Plan     Row Name 09/09/19 1045          PT Assessment    Functional Limitations  Impaired gait;Limitation in home management;Limitations in community activities;Performance in work activities;Performance in self-care ADL;Performance in leisure activities;Limitations in functional capacity and performance  -SP     Impairments  Gait;Range of motion;Posture;Poor body mechanics;Pain;Muscle strength;Impaired flexibility  -SP     Assessment Comments  Patient with chronic history of back and sciatic type pain that recently increased approximately 3-4 weeks ago after lifting and twisting activity.  Patient presents with pain, decreased trunk and LE strength, decreased trunk mobility, and impaired functional ability to complete home and community ADLs  -SP     Please refer to paper survey for additional self-reported information  Yes  -SP     Rehab Potential  Good  -SP     Patient/caregiver participated in establishment of treatment plan and goals  Yes  -SP     Patient would benefit from skilled therapy intervention  Yes  -SP        PT Plan    PT Frequency  2x/week  -SP     Predicted Duration of Therapy Intervention (Therapy Eval)  4 weeks  -SP     Planned CPT's?  PT EVAL LOW COMPLEXITY: 83168;PT MANUAL THERAPY EA 15 MIN: 28617;PT HOT OR COLD PACK TREAT MCARE;PT ULTRASOUND EA 15 MIN: 98999;PT THER PROC EA 15 MIN: 29450;PT ELECTRICAL STIM UNATTEND:   -SP       User Key  (r) = Recorded By, (t) = Taken By, (c) = Cosigned By    Initials Name Provider Type    Priscila Wiggins, PT  Physical Therapist          Modalities     Row Name 09/09/19 0800             Moist Heat    MH Applied  Yes patient supine with LEs on stool  -SP      Location  L/S area  -SP      Rx Minutes  12 mins  -SP      MH Prior to Rx  Yes  -SP        User Key  (r) = Recorded By, (t) = Taken By, (c) = Cosigned By    Initials Name Provider Type    Priscila Wiggins, PT Physical Therapist        OP Exercises     Row Name 09/09/19 1000             Exercise 1    Exercise Name 1  SKTC (with towel for assist)  -SP      Cueing 1  Verbal;Tactile  -SP      Reps 1  3  -SP      Time 1  20 sec  -SP         Exercise 2    Exercise Name 2  piriformis stretch  -SP      Cueing 2  Verbal;Demo  -SP      Reps 2  3  -SP      Time 2  20 sec  -SP         Exercise 3    Exercise Name 3  PPT  -SP      Cueing 3  Verbal;Demo  -SP      Reps 3  10  -SP      Time 3  5sec  -SP        User Key  (r) = Recorded By, (t) = Taken By, (c) = Cosigned By    Initials Name Provider Type    Priscila Wiggins, PT Physical Therapist           Manual Rx (last 36 hours)      Manual Treatments     Row Name 09/09/19 0800             Manual Rx 1    Manual Rx 1 Location  right innominate  -SP      Manual Rx 1 Type  MET: pelvic clearing and right anterior innominate correct  -SP        User Key  (r) = Recorded By, (t) = Taken By, (c) = Cosigned By    Initials Name Provider Type    Priscila Wiggins, PT Physical Therapist                      Outcome Measure Options: Other Outcome Measure  Other Outcome Measure Tool Used  Other Outcome Measure Tool Comments: Back index score 60      Time Calculation:     Start Time: 0815  Stop Time: 0920  Time Calculation (min): 65 min     Therapy Charges for Today     Code Description Service Date Service Provider Modifiers Qty    [id number]  PT EVAL MOD COMPLEXITY 3 9/9/2019 Priscila Gonzalez, PT GP 1          PT G-Codes  Outcome Measure Options: Other Outcome Measure         Priscila Gonzalez  PT  9/9/2019

## 2019-09-11 ENCOUNTER — HOSPITAL ENCOUNTER (OUTPATIENT)
Dept: PHYSICAL THERAPY | Facility: HOSPITAL | Age: 64
Setting detail: THERAPIES SERIES
Discharge: HOME | End: 2019-09-11

## 2019-09-11 DIAGNOSIS — M54.30 SCIATICA, UNSPECIFIED LATERALITY: ICD-10-CM

## 2019-09-11 DIAGNOSIS — M54.41 RIGHT-SIDED LOW BACK PAIN WITH RIGHT-SIDED SCIATICA, UNSPECIFIED CHRONICITY: ICD-10-CM

## 2019-09-11 DIAGNOSIS — M54.10 RADICULOPATHY WITH LOWER EXTREMITY SYMPTOMS: Primary | ICD-10-CM

## 2019-09-11 PROCEDURE — 97012 MECHANICAL TRACTION THERAPY: CPT | Performed by: PHYSICAL THERAPIST

## 2019-09-11 PROCEDURE — 97110 THERAPEUTIC EXERCISES: CPT | Performed by: PHYSICAL THERAPIST

## 2019-09-11 NOTE — THERAPY TREATMENT NOTE
Outpatient Physical Therapy Ortho Treatment Note  GERRY Perez     Patient Name: Celina Barry  : 1955  MRN: [id number]  Today's Date: 2019      Visit Date: 2019    Visit Dx:    ICD-10-CM ICD-9-CM   1. Radiculopathy with lower extremity symptoms M54.10 724.4   2. Sciatica, unspecified laterality M54.30 724.3   3. Right-sided low back pain with right-sided sciatica, unspecified chronicity M54.41 724.3       Patient Active Problem List   Diagnosis   • Urinary frequency   • Acute midline low back pain without sciatica        Past Medical History:   Diagnosis Date   • Allergic    • Osteoarthritis    • Sciatica         Past Surgical History:   Procedure Laterality Date   • SHOULDER SURGERY Right    • SKIN CANCER EXCISION         PT Ortho     Row Name 19 1000       Subjective Comments    Subjective Comments  Patient reports that her back is feeling some better.  She continues to complain of left knee pain.  Discussed with patient that she does not have orders to address left knee, that orders are for low back pain, right sided sciatica.   -SP    Row Name 19 0800       Posture/Observations    Lumbar lordosis  Increased  -SP    Iliac crests  Bilateral:;Normal  -SP    Posture/Observations Comments  bialteral genu valgus; appears to have right anterior innominate  -SP       Neural Tension Signs- Lower Quarter Clearing    SLR  Bilateral:;Negative  -SP       Sensory Screen for Light Touch- Lower Quarter Clearing    L1 (inguinal area)  Bilateral:;Intact  -SP    L2 (anterior mid thigh)  Bilateral:;Intact  -SP    L3 (distal anterior thigh)  Bilateral:;Intact  -SP    L4 (medial lower leg/foot)  Bilateral:;Intact  -SP    L5 (lateral lower leg/great toe)  Bilateral:;Intact  -SP    S1 (bottom of foot)  Bilateral:;Intact  -SP       Myotomal Screen- Lower Quarter Clearing    Hip flexion (L2)  Right:;3 (Fair);Left:;4- (Good -)  -SP    Knee extension (L3)  Right:;3+ (Fair +);Left:;4- (Good -)  -SP    Ankle DF  (L4)  Right:;4- (Good -);Left:;4 (Good)  -SP    Great toe extension (L5)  Right:;4- (Good -);Left:;4 (Good)  -SP    Ankle PF (S1)  Right:;3 (Fair);Left:;3+ (Fair +) patient unable to heel raise bilaterally  -SP    Knee flexion (S2)  Right:;4- (Good -);Left:;4 (Good)  -SP       Lumbar/SI Special Tests    Standing Flexion Test (SI Dysfunction)  Right:;Positive  -SP    SLR (Neural Tension)  Bilateral:;Negative  -SP       Lumbosacral Palpation    SI  Right:;Tender  -SP    Erector Spinae (Paraspinals)  Bilateral:;Tender;Guarded/taut  -SP       General ROM    GENERAL ROM COMMENTS  (B) LE AROM wfl with complaints of pain at end ranges of hip flexion and left knee ROM pain  -SP       Head/Neck/Trunk    Trunk Extension AROM  decreased by 25% with low back pain  -SP    Trunk Flexion AROM  decreased by 50% from full range with complaints of tightness at end range  -SP    Trunk Lt Lateral Flexion AROM  decreased by 50% from full range with reports of tightness in low back  -SP    Trunk Rt Lateral Flexion AROM  decreased by 50% from full range with complaints of pain in right L/S area  -SP    Trunk Lt Rotation AROM  decreased by 50% from full range  -SP    Trunk Rt Rotation AROM  decreased by 50% from full range  -SP       MMT Neck/Trunk    Trunk Flexion MMT, Gross Movement  (2/5) poor  -SP    Left Pelvic Elevation MMT, Gross Movement  (2-/5) poor minus  -SP    Right Pelvic Elevation MMT, Gross Movement:  (2-/5) poor minus  -SP       Lower Extremity Flexibility    Hamstrings  Bilateral:;Moderately limited  -SP    Hip Flexors  Bilateral:;Moderately limited  -SP    Hip External Rotators  Bilateral:;Moderately limited  -SP       Balance Skills Training    SLS  unable to SLS without UE support  -SP       Transfers    Sit-Stand [place] (Transfers)  independent  -SP    Stand-Sit [place] (Transfers)  independent  -SP    Comment (Transfers)  uses UEs to assist with transfers sit to stand  -SP       Gait/Stairs  Assessment/Training    [place] Level (Gait)  independent  -SP    Deviations/Abnormal Patterns (Gait)  chloé decreased;stride length decreased  -SP      User Key  (r) = Recorded By, (t) = Taken By, (c) = Cosigned By    Initials Name Provider Type    Priscila Wiggins, PT Physical Therapist                      PT Assessment/Plan     Row Name 09/11/19 1120 09/11/19 1102       PT Assessment    Assessment Comments  Patient tolerates intermittent pelvic traction without complaints of increased pain.  Demonstrates good technique with pelvic tilt  -SP  Patient  -SP       PT Plan    PT Plan Comments  Assess for response to traction  -SP  --      User Key  (r) = Recorded By, (t) = Taken By, (c) = Cosigned By    Initials Name Provider Type    Priscila Wiggins, PT Physical Therapist          Modalities     Row Name 09/11/19 1000             Moist Heat    MH Applied  Yes  -SP      Location  L/S area  -SP      Rx Minutes  12 mins  -SP      MH Prior to Rx  Yes  -SP         Traction 75405    Traction Type  Lumbar  -SP      Rx Minutes  12 min  -SP      Duration  Intermittent  -SP      Position  Hook-lying 90/90  -SP      Weight  60  -SP      Hold  50  -SP      Relax  15  -SP        User Key  (r) = Recorded By, (t) = Taken By, (c) = Cosigned By    Initials Name Provider Type    Priscila Wiggins, PT Physical Therapist        OP Exercises     Row Name 09/11/19 1000             Subjective Comments    Subjective Comments  Patient reports that her back is feeling some better.  She continues to complain of left knee pain.  Discussed with patient that she does not have orders to address left knee, that orders are for low back pain, right sided sciatica.   -SP         Exercise 1    Exercise Name 1  SKTC (with towel for assist)  -SP      Cueing 1  Verbal;Tactile  -SP      Reps 1  3  -SP      Time 1  20 sec  -SP         Exercise 2    Exercise Name 2  piriformis stretch  -SP      Cueing 2  Verbal;Demo   -SP      Reps 2  3  -SP      Time 2  20 sec  -SP         Exercise 3    Exercise Name 3  PPT  -SP      Cueing 3  Verbal;Demo  -SP      Reps 3  15  -SP      Time 3  5sec  -SP         Exercise 4    Exercise Name 4  PPT with ball squeeze  -SP      Cueing 4  Verbal;Tactile  -SP      Reps 4  15  -SP      Time 4  5sec  -SP        User Key  (r) = Recorded By, (t) = Taken By, (c) = Cosigned By    Initials Name Provider Type    Priscila Wiggins, PT Physical Therapist                      Manual Rx (last 36 hours)      Manual Treatments     Row Name 09/11/19 1000             Manual Rx 1    Manual Rx 1 Location  right innominate  -SP      Manual Rx 1 Type  MET: pelvic clearing and right anterior innominate correct  -SP        User Key  (r) = Recorded By, (t) = Taken By, (c) = Cosigned By    Initials Name Provider Type    Priscila Wiggins, PT Physical Therapist              Therapy Education  Given: HEP  Program: Reinforced  How Provided: Verbal  Provided to: Patient  Level of Understanding: Verbalized, Demonstrated              Time Calculation:   Start Time: 1001  Stop Time: 1103  Time Calculation (min): 62 min  Therapy Charges for Today     Code Description Service Date Service Provider Modifiers Qty    00022811790 HC PT THER PROC EA 15 MIN 9/11/2019 Priscila Gonzalez, PT GP 1    [id number]  PT TRACTION LUMBAR 9/11/2019 Priscila Gonzalez, PT GP 1                    Priscila Gonzalez, PT  9/11/2019

## 2019-09-17 ENCOUNTER — HOSPITAL ENCOUNTER (OUTPATIENT)
Dept: PHYSICAL THERAPY | Facility: HOSPITAL | Age: 64
Setting detail: THERAPIES SERIES
Discharge: HOME | End: 2019-09-17

## 2019-09-17 DIAGNOSIS — M54.10 RADICULOPATHY WITH LOWER EXTREMITY SYMPTOMS: Primary | ICD-10-CM

## 2019-09-17 DIAGNOSIS — M54.41 RIGHT-SIDED LOW BACK PAIN WITH RIGHT-SIDED SCIATICA, UNSPECIFIED CHRONICITY: ICD-10-CM

## 2019-09-17 DIAGNOSIS — M51.36 DDD (DEGENERATIVE DISC DISEASE), LUMBAR: ICD-10-CM

## 2019-09-17 DIAGNOSIS — M54.30 SCIATICA, UNSPECIFIED LATERALITY: ICD-10-CM

## 2019-09-17 PROCEDURE — 97012 MECHANICAL TRACTION THERAPY: CPT | Performed by: PHYSICAL THERAPIST

## 2019-09-17 PROCEDURE — 97110 THERAPEUTIC EXERCISES: CPT | Performed by: PHYSICAL THERAPIST

## 2019-09-17 NOTE — THERAPY TREATMENT NOTE
Outpatient Physical Therapy Ortho Treatment Note  [facility] [place]     Patient Name: Celina Barry  : 1955  MRN: [id number]  Today's Date: 2019      Visit Date: 2019    Visit Dx:    ICD-10-CM ICD-9-CM   1. Radiculopathy with lower extremity symptoms M54.10 724.4   2. Sciatica, unspecified laterality M54.30 724.3   3. Right-sided low back pain with right-sided sciatica, unspecified chronicity M54.41 724.3   4. DDD (degenerative disc disease), lumbar M51.36 722.52       Patient Active Problem List   Diagnosis   • Urinary frequency   • Acute midline low back pain without sciatica        Past Medical History:   Diagnosis Date   • Allergic    • Osteoarthritis    • Sciatica         Past Surgical History:   Procedure Laterality Date   • SHOULDER SURGERY Right    • SKIN CANCER EXCISION         PT Ortho     Row Name 19 0905       Subjective Comments    Subjective Comments  Patient reports that her back felt much better after last vist and she did not notice as much right hip/SI joint area pain.  Patient reports that she is having most pain in left knee and has orders to evaluate it.   -SP      User Key  (r) = Recorded By, (t) = Taken By, (c) = Cosigned By    Initials Name Provider Type    Priscila Wiggins, PT Physical Therapist                      PT Assessment/Plan     Row Name 19 3901          PT Assessment    Assessment Comments  Patient with decrease low back and right LE radicular type symptoms following traction.    -SP        PT Plan    PT Plan Comments  Evaluate left knee next visit.   -SP       User Key  (r) = Recorded By, (t) = Taken By, (c) = Cosigned By    Initials Name Provider Type    Priscila Wiggins, PT Physical Therapist          Modalities     Row Name 19 0905             Moist Heat    MH Applied  Yes  -SP      Location  L/S area  -SP      Rx Minutes  12 mins  -SP      MH Prior to Rx  Yes  -SP         Traction 74851    Traction Type  Lumbar  -SP      Rx  Minutes  12 min  -SP      Position  Hook-lying 90/90  -SP      Weight  60  -SP      Hold  50  -SP      Relax  15  -SP        User Key  (r) = Recorded By, (t) = Taken By, (c) = Cosigned By    Initials Name Provider Type    Priscila Wiggins, VILMA Physical Therapist        OP Exercises     Row Name 09/17/19 0905             Subjective Comments    Subjective Comments  Patient reports that her back felt much better after last vist and she did not notice as much right hip/SI joint area pain.  Patient reports that she is having most pain in left knee and has orders to evaluate it.   -SP         Exercise 1    Exercise Name 1  SKTC (with towel for assist)  -SP      Cueing 1  Verbal;Tactile  -SP      Reps 1  3  -SP      Time 1  20 sec  -SP         Exercise 2    Exercise Name 2  piriformis stretch  -SP      Cueing 2  Verbal;Demo  -SP      Reps 2  3  -SP      Time 2  20 sec  -SP         Exercise 3    Exercise Name 3  PPT  -SP      Cueing 3  Verbal;Demo  -SP      Reps 3  15  -SP      Time 3  5sec  -SP         Exercise 4    Exercise Name 4  PPT with ball squeeze  -SP      Cueing 4  Verbal;Tactile  -SP      Reps 4  15  -SP      Time 4  5sec  -SP         Exercise 5    Exercise Name 5  Hamstring stretch  -SP      Cueing 5  Verbal;Tactile  -SP      Reps 5  3  -SP      Time 5  20 sec  -SP        User Key  (r) = Recorded By, (t) = Taken By, (c) = Cosigned By    Initials Name Provider Type    Priscila Wiggins, VILMA Physical Therapist                      Manual Rx (last 36 hours)      Manual Treatments     Row Name 09/17/19 0905             Manual Rx 1    Manual Rx 1 Location  right innominate  -SP      Manual Rx 1 Type  MET: pelvic clearing and right anterior innominate correct  -SP        User Key  (r) = Recorded By, (t) = Taken By, (c) = Cosigned By    Initials Name Provider Type    Priscila Wiggins, VILMA Physical Therapist              Therapy Education  Given: HEP  Program: Reinforced  How Provided:  Verbal  Provided to: Patient  Level of Understanding: Verbalized, Demonstrated              Time Calculation:   Start Time: 0905  Stop Time: 1010  Time Calculation (min): 65 min  Therapy Charges for Today     Code Description Service Date Service Provider Modifiers Qty    32819536309 HC PT TRACTION LUMBAR 9/17/2019 Priscila Gonzalez, PT GP 1    [id number]  PT THER PROC EA 15 MIN 9/17/2019 Priscila Gonzalez, PT GP 1                    Priscila Gonzalez, PT  9/17/2019

## 2019-09-19 ENCOUNTER — HOSPITAL ENCOUNTER (OUTPATIENT)
Dept: PHYSICAL THERAPY | Facility: HOSPITAL | Age: 64
Setting detail: THERAPIES SERIES
Discharge: HOME | End: 2019-09-19

## 2019-09-19 DIAGNOSIS — M25.562 PAIN AND SWELLING OF LEFT KNEE: Primary | ICD-10-CM

## 2019-09-19 DIAGNOSIS — M25.462 PAIN AND SWELLING OF LEFT KNEE: Primary | ICD-10-CM

## 2019-09-19 PROCEDURE — G0283 ELEC STIM OTHER THAN WOUND: HCPCS | Performed by: PHYSICAL THERAPIST

## 2019-09-19 PROCEDURE — 97161 PT EVAL LOW COMPLEX 20 MIN: CPT | Performed by: PHYSICAL THERAPIST

## 2019-09-19 NOTE — THERAPY EVALUATION
"    Outpatient Physical Therapy Ortho Initial Evaluation  GERRY Perez     Patient Name: Celina Barry  : 1955  MRN: [id number]  Today's Date: 2019      Visit Date: 2019    Patient Active Problem List   Diagnosis   • Urinary frequency   • Acute midline low back pain without sciatica        Past Medical History:   Diagnosis Date   • Allergic    • Osteoarthritis    • Sciatica         Past Surgical History:   Procedure Laterality Date   • SHOULDER SURGERY Right    • SKIN CANCER EXCISION         Visit Dx:     ICD-10-CM ICD-9-CM   1. Pain and swelling of left knee M25.562 719.46    M25.462 719.06         Patient History     Row Name 19 0900             History    Chief Complaint  Pain;Difficulty Walking;Difficulty with daily activities  -SP      Type of Pain  Knee pain  -SP      Date Current Problem(s) Began  19 MD order  -SP      Brief Description of Current Complaint  Patient with chronic history of left knee pain.  She has been seen multiple times in the past for her knee and back pain and typically improves but does not remain compliant with exercises.  She is currently being seen for low back and right sciatic type pain.  She presents today with order to evaluate for left knee pain and swelling.  Patient reports that both knees are bothering her today.  She had history of injections to right knee.  Patient states that her left knee began bothering her when she began having recent left knee pain.  She thinks shifting her weight and lifting for work duties aggravated symptoms.  Patient reports that she has had no testing done on left knee and not seen an ortho MD.  Patient then describes pain throughout thigh and calf \"I didn't even realize it\"  Patient stating its sore and tight in medial thigh.  Patient reports that she has multiple episodes of left knee buckling and is somewhat fearful of falling.   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  meds, heat  -SP      Results of Clinical Tests  patient told she has arthritis  -SP      Are you or can you be pregnant  No  -SP      What clinical tests have you had for this problem?  --  -SP         Pain     Pain Location  Knee left  -SP      Pain at Present  8  -SP      Pain at Best  0  -SP      Pain at Worst  8  -SP      Pain Frequency  Intermittent  -SP      Pain Description  Tightness  -SP      What Performance Factors Make the Current Problem(s) WORSE?  weight bearing standing, walking  -SP      Tolerance Time- Standing  reports pain upon immediate stand  -SP      Tolerance Time- Sitting  patient reports that she does not have knee pain with sitting  -SP      Tolerance Time- Walking  reports that she has difficulty walking  -SP      Is your sleep disturbed?  Yes  -SP      Difficulties at work?  limited tolerance for lifting activity required for work  -SP      Difficulties with ADL's?  difficulty tolerating any weight bearing ADLs  -SP         Fall Risk Assessment    Any falls in the past year:  No  -SP      Factors that contributed to the fall:  Other (comment)  -SP         Daily Activities    Primary Language  English  -SP      Are you able to read  Yes  -SP      Are you able to write  Yes  -SP      How does patient learn best?  Listening  -SP      Teaching needs identified  Home Exercise Program;Management of Condition  -SP      Patient is concerned about/has problems with  Climbing Stairs;Performing home management (household chores, shopping, care of dependents);Performing job responsibilities/community activities (work, school,;Standing;Walking;Transfers (getting out of a chair, bed)  -SP      Does  patient have problems with the following?  None  -SP      Barriers to learning  None  -SP      Pt Participated in POC and Goals  Yes  -SP         Safety    Are you being hurt, hit, or frightened by anyone at home or in your life?  No  -SP      Are you being neglected by a caregiver  No  -SP        User Key  (r) = Recorded By, (t) = Taken By, (c) = Cosigned By    Initials Name Provider Type    SP Priscila Gonzalez, PT Physical Therapist          PT Ortho     Row Name 09/19/19 0900       Posture/Observations    Iliac crests  Normal  -SP    Genu valgus  Bilateral:;Moderate  genu recuvatum  -SP    Foot pronation  Bilateral:;Mild  -SP    Observations  Edema  -SP    Posture/Observations Comments  (B) genu valgus and recurvatum  -SP       Knee Palpation    Patella  Left:;Tender  -SP    Patella Tendon  Left:;Tender  -SP    Medial Joint Line  Left:;Tender;Swollen  -SP    Lateral Joint Line  Left:;Tender;Swollen  -SP    Posterior Joint Line  Left:;Tender;Swollen  -SP       Knee Special Tests    Anterior drawer (ACL lesion)  Left:;Negative  -SP    Valgus stress (MCL lesion)  Left: lax with testing, no pain  -SP    Francisca’s test (meniscal lesion)  Left:;Negative  -SP       General ROM    GENERAL ROM COMMENTS  left hip and ankle AROM wfl  -SP       Left Lower Ext    Lt Knee Extension/Flexion AROM  0 to 118 degrees with pain at end range of flexion  -SP       MMT Left Lower Ext    Lt Hip Flexion MMT, Gross Movement  (4-/5) good minus  -SP    Lt Hip ABduction MMT, Gross Movement  (4-/5) good minus  -SP    Lt Hip ADduction MMT, Gross Movement  (3+/5) fair plus  -SP    Lt Knee Extension MMT, Gross Movement  (4-/5) good minus  -SP    Lt Knee Flexion MMT, Gross Movement  (4/5) good  -SP    Lt Ankle Plantarflexion MMT, Gross Movement  (3+/5) fair plus  -SP    Lt Ankle Dorsiflexion MMT, Gross Movement  (4/5) good  -SP       Sensation    Sensation WNL?  WFL  -SP       Flexibility    Flexibility Tested?  Lower Extremity  -SP        Lower Extremity Flexibility    Hamstrings  Bilateral:;Moderately limited  -SP    Hip Flexors  Bilateral:;Moderately limited  -SP    Quadriceps  Bilateral:;Moderately limited  -SP    Gastrocnemius  Bilateral:;Moderately limited  -SP       LLE Quick Girth (cm)    Mid patella  49 cm  -SP    Other 1  57.5 cm  -SP    Other 2  44.8 cm  -SP       Transfers    Sit-Stand [place] (Transfers)  independent  -SP    Stand-Sit [place] (Transfers)  independent  -SP    Comment (Transfers)  uses UEs to assist with transfers to stand  -SP       Gait/Stairs Assessment/Training    [place] Level (Gait)  independent  -SP    [place] Level (Stairs)  independent  -SP    Row Name 09/17/19 0905       Subjective Comments    Subjective Comments  Patient reports that her back felt much better after last vist and she did not notice as much right hip/SI joint area pain.  Patient reports that she is having most pain in left knee and has orders to evaluate it.   -SP      User Key  (r) = Recorded By, (t) = Taken By, (c) = Cosigned By    Initials Name Provider Type    Priscila Wiggins, PT Physical Therapist                            PT OP Goals     Row Name 09/19/19 1100          PT Short Term Goals    STG Date to Achieve  10/04/19  -SP     STG 1  Patient reports no episodes of left knee buckling x 1 week  -SP     STG 2  Patient demonstrates left knee AROM 0 to 120 degrees without complaints of pain at end ranges  -SP     STG 3  Patient observed to transfer sit to stand with decreased use of UEs to assist with transfer  -SP        Long Term Goals    LTG Date to Achieve  10/19/19  -SP     LTG 1  Patient demonstrates left LE strength increased by one muscle grade  -SP     LTG 2  Patient ambulates level and unlevel surfaces with normal reciprocal gait pattern and no episodes of instability  -SP     LTG 3  Patient independent with HEP  -SP        Time Calculation    PT Goal Re-Cert Due Date  10/19/19  -SP       User Key   (r) = Recorded By, (t) = Taken By, (c) = Cosigned By    Initials Name Provider Type    Priscila Wiggins, PT Physical Therapist          PT Assessment/Plan     Row Name 09/19/19 1154 09/19/19 1124       PT Assessment    Functional Limitations  --  Impaired gait;Limitation in home management;Limitations in community activities;Limitations in functional capacity and performance;Performance in leisure activities  -SP    Impairments  --  Gait;Impaired flexibility;Muscle strength;Pain;Range of motion;Edema  -SP    Assessment Comments  --  Patient with chronic left knee pain that has increased with recent onset of low back and right sciatic type pain.   Patient presents with pain, edema, decreased ROM, decreased strength, altered gait and difficulty tolerating weight bearing ADLs  -SP    Please refer to paper survey for additional self-reported information  --  Yes  -SP    Rehab Potential  --  Good  -SP    Patient/caregiver participated in establishment of treatment plan and goals  --  Yes  -SP    Patient would benefit from skilled therapy intervention  --  Yes  -SP       PT Plan    PT Frequency  --  2x/week  -SP    Predicted Duration of Therapy Intervention (Therapy Eval)  --  4 weeks  -SP    Planned CPT's?  --  PT EVAL LOW COMPLEXITY: 29397;PT MANUAL THERAPY EA 15 MIN: 94512;PT HOT OR COLD PACK TREAT [place];PT ULTRASOUND EA 15 MIN: 48407;PT THER PROC EA 15 MIN: 06247;PT ELECTRICAL STIM UNATTEND:   -SP    PT Plan Comments  Initiate strengthening for LLE next visit  -SP  --      User Key  (r) = Recorded By, (t) = Taken By, (c) = Cosigned By    Initials Name Provider Type    Priscila Wiggins, PT Physical Therapist          Modalities     Row Name 09/19/19 1100             Ice    Ice Applied  Yes with IFC  -SP      Location  left knee  -SP      Rx Minutes  15 mins  -SP      Ice S/P Rx  Yes  -SP         ELECTRICAL STIMULATION    Attended/Unattended  Unattended  -SP      Stimulation Type  IFC  -SP    "   Location/Electrode Placement/Other  left knee  -SP         Other Treatment Provided    Taping / Bracing  kinesiotape for left knee support with 2 \"I\" strips anchored at medial and lateral proximal tibia area and pulled across opposite sides of patella.  Space correct across joint line  -SP        User Key  (r) = Recorded By, (t) = Taken By, (c) = Cosigned By    Initials Name Provider Type    Priscila Wiggins, PT Physical Therapist                          Outcome Measure Options: Lower Extremity Functional Scale (LEFS)  Lower Extremity Functional Index  Any of your usual work, housework or school activities: Quite a bit of difficulty  Your usual hobbies, recreational or sporting activities: Extreme difficulty or unable to perform activity  Getting into or out of the bath: A little bit of difficulty  Walking between rooms: Moderate difficulty  Putting on your shoes or socks: No difficulty  Squatting: Quite a bit of difficulty  Lifting an object, like a bag of groceries from the floor: No difficulty  Performing light activities around your home: A little bit of difficulty  Performing heavy activities around your home: Extreme difficulty or unable to perform activity  Getting into or out of a car: A little bit of difficulty  Walking 2 blocks: Extreme difficulty or unable to perform activity  Walking a mile: Extreme difficulty or unable to perform activity  Going up or down 10 stairs (about 1 flight of stairs): Extreme difficulty or unable to perform activity  Standing for 1 hour: Quite a bit of difficulty  Sitting for 1 hour: A little bit of difficulty  Running on even ground: Extreme difficulty or unable to perform activity  Running on uneven ground: Extreme difficulty or unable to perform activity  Making sharp turns while running fast: Extreme difficulty or unable to perform activity  Hopping: Extreme difficulty or unable to perform activity  Rolling over in bed: A little bit of difficulty  Total: " 28      Time Calculation:     Start Time: 0915  Stop Time: 1010  Time Calculation (min): 55 min     Therapy Charges for Today     Code Description Service Date Service Provider Modifiers Qty    09557273356 HC PT ELECTRICAL STIM UNATTENDED 9/19/2019 Priscila Gonzalez, PT  1    [id number] HC PT EVAL LOW COMPLEXITY 2 9/19/2019 Priscila Gonzalez, PT GP 1          PT G-Codes  Outcome Measure Options: Lower Extremity Functional Scale (LEFS)  Total: 28         Priscila Gonzalez, PT  9/19/2019

## 2019-09-23 ENCOUNTER — APPOINTMENT (OUTPATIENT)
Dept: PHYSICAL THERAPY | Facility: HOSPITAL | Age: 64
End: 2019-09-23

## 2019-09-25 ENCOUNTER — HOSPITAL ENCOUNTER (OUTPATIENT)
Dept: PHYSICAL THERAPY | Facility: HOSPITAL | Age: 64
Setting detail: THERAPIES SERIES
Discharge: HOME | End: 2019-09-25

## 2019-09-25 DIAGNOSIS — M54.41 RIGHT-SIDED LOW BACK PAIN WITH RIGHT-SIDED SCIATICA, UNSPECIFIED CHRONICITY: ICD-10-CM

## 2019-09-25 DIAGNOSIS — M51.36 DDD (DEGENERATIVE DISC DISEASE), LUMBAR: ICD-10-CM

## 2019-09-25 DIAGNOSIS — M54.30 SCIATICA, UNSPECIFIED LATERALITY: ICD-10-CM

## 2019-09-25 DIAGNOSIS — M25.462 PAIN AND SWELLING OF LEFT KNEE: Primary | ICD-10-CM

## 2019-09-25 DIAGNOSIS — M25.562 PAIN AND SWELLING OF LEFT KNEE: Primary | ICD-10-CM

## 2019-09-25 DIAGNOSIS — M54.10 RADICULOPATHY WITH LOWER EXTREMITY SYMPTOMS: ICD-10-CM

## 2019-09-25 PROCEDURE — 97110 THERAPEUTIC EXERCISES: CPT | Performed by: PHYSICAL THERAPIST

## 2019-09-25 NOTE — THERAPY TREATMENT NOTE
Outpatient Physical Therapy Ortho Treatment Note  [facility]     Patient Name: Celina Barry  : 1955  MRN: [id number]  Today's Date: 2019      Visit Date: 2019    Visit Dx:    ICD-10-CM ICD-9-CM   1. Pain and swelling of left knee M25.562 719.46    M25.462 719.06   2. Radiculopathy with lower extremity symptoms M54.10 724.4   3. Sciatica, unspecified laterality M54.30 724.3   4. Right-sided low back pain with right-sided sciatica, unspecified chronicity M54.41 724.3   5. DDD (degenerative disc disease), lumbar M51.36 722.52       Patient Active Problem List   Diagnosis   • Urinary frequency   • Acute midline low back pain without sciatica        Past Medical History:   Diagnosis Date   • Allergic    • Osteoarthritis    • Sciatica         Past Surgical History:   Procedure Laterality Date   • SHOULDER SURGERY Right    • SKIN CANCER EXCISION         PT Ortho     Row Name 19 0900       Subjective Comments    Subjective Comments  Patient arrives 15 minutes late for scheduled vists.  She reports she had to cancel last visit as she was feeling ill over the weekend with a myriad of full body syptoms.  She reports that her back and legs were hurting, she was having sweating episodes, her arms were tender to the touch, and she was having itching on her head and abdomen area.  Patient reports that she was having gastrointestinal symptoms.  Patient states that she thought she should go to the ER but did not think she could sit for 3 hours to wait.  She states she was fearful because she was alone at home.  Patient states she just rested all day yesterday and is feeling better today but continues to have some symptoms including back and leg pain and itching.    -SP      User Key  (r) = Recorded By, (t) = Taken By, (c) = Cosigned By    Initials Name Provider Type    Priscila Wiggins, PT Physical Therapist                      PT Assessment/Plan     Row Name 19 6554          PT Assessment     Assessment Comments  Patient becomes emotionally upset and crying during treatment due to personal and health issues.  Patient asked if she needs to speak with someone and hospital ta Ale Kelvin presents to speak with patient.  Patient did not complete exercises today.  -SP        PT Plan    PT Plan Comments  Continue to progress trunk and LE strengthening.   -SP       User Key  (r) = Recorded By, (t) = Taken By, (c) = Cosigned By    Initials Name Provider Type    Priscila Wiggins, PT Physical Therapist          Modalities     Row Name 09/25/19 0900             Ice    Ice Applied  Yes  -SP      Location  left knee  -SP      Rx Minutes  15 mins  -SP      Ice S/P Rx  Yes  -SP         ELECTRICAL STIMULATION    Attended/Unattended  Unattended  -SP      Stimulation Type  IFC  -SP      Location/Electrode Placement/Other  left knee  -SP        User Key  (r) = Recorded By, (t) = Taken By, (c) = Cosigned By    Initials Name Provider Type    Priscila Wiggins, PT Physical Therapist        OP Exercises     Row Name 09/25/19 0900             Subjective Comments    Subjective Comments  Patient arrives 15 minutes late for scheduled vists.  She reports she had to cancel last visit as she was feeling ill over the weekend with a myriad of full body syptoms.  She reports that her back and legs were hurting, she was having sweating episodes, her arms were tender to the touch, and she was having itching on her head and abdomen area.  Patient reports that she was having gastrointestinal symptoms.  Patient states that she thought she should go to the ER but did not think she could sit for 3 hours to wait.  She states she was fearful because she was alone at home.  Patient states she just rested all day yesterday and is feeling better today but continues to have some symptoms including back and leg pain and itching.    -SP         Exercise 1    Exercise Name 1  SKTC (with towel for assist)  -SP      Cueing 1   Verbal;Tactile  -SP      Reps 1  3  -SP      Time 1  20 sec  -SP         Exercise 2    Exercise Name 2  piriformis stretch  -SP      Cueing 2  Verbal;Demo  -SP      Reps 2  3  -SP      Time 2  20 sec  -SP         Exercise 3    Additional Comments  attempts to perform pelvic tilt but has hamstring area cramp then back cramping and exercise discontinued  -SP         Exercise 5    Exercise Name 5  Hamstring stretch  -SP      Cueing 5  Verbal;Tactile  -SP      Reps 5  3  -SP      Time 5  20 sec  -SP        User Key  (r) = Recorded By, (t) = Taken By, (c) = Cosigned By    Initials Name Provider Type    Priscila Wiggins, PT Physical Therapist                      Manual Rx (last 36 hours)      Manual Treatments     Row Name 09/25/19 0900             Manual Rx 1    Manual Rx 1 Location  right innominate  -SP      Manual Rx 1 Type  MET: pelvic clearing and right anterior innominate correct  -SP        User Key  (r) = Recorded By, (t) = Taken By, (c) = Cosigned By    Initials Name Provider Type    Priscila Wiggins, VILMA Physical Therapist                             Time Calculation:   Start Time: 0918  Stop Time: 0955  Time Calculation (min): 37 min  Therapy Charges for Today     Code Description Service Date Service Provider Modifiers Qty    34492103797  PT THER PROC EA 15 MIN 9/25/2019 Priscila Gonzalez, PT GP 1                    Priscila Gonzalez, PT  9/25/2019

## 2019-09-30 ENCOUNTER — HOSPITAL ENCOUNTER (OUTPATIENT)
Dept: PHYSICAL THERAPY | Facility: HOSPITAL | Age: 64
Setting detail: THERAPIES SERIES
Discharge: HOME | End: 2019-09-30

## 2019-09-30 DIAGNOSIS — M25.462 PAIN AND SWELLING OF LEFT KNEE: Primary | ICD-10-CM

## 2019-09-30 DIAGNOSIS — M54.30 SCIATICA, UNSPECIFIED LATERALITY: ICD-10-CM

## 2019-09-30 DIAGNOSIS — M25.562 PAIN AND SWELLING OF LEFT KNEE: Primary | ICD-10-CM

## 2019-09-30 DIAGNOSIS — M51.36 DDD (DEGENERATIVE DISC DISEASE), LUMBAR: ICD-10-CM

## 2019-09-30 DIAGNOSIS — M54.41 RIGHT-SIDED LOW BACK PAIN WITH RIGHT-SIDED SCIATICA, UNSPECIFIED CHRONICITY: ICD-10-CM

## 2019-09-30 DIAGNOSIS — M54.10 RADICULOPATHY WITH LOWER EXTREMITY SYMPTOMS: ICD-10-CM

## 2019-09-30 PROCEDURE — 97110 THERAPEUTIC EXERCISES: CPT | Performed by: PHYSICAL THERAPIST

## 2019-09-30 PROCEDURE — G0283 ELEC STIM OTHER THAN WOUND: HCPCS | Performed by: PHYSICAL THERAPIST

## 2019-10-02 ENCOUNTER — HOSPITAL ENCOUNTER (OUTPATIENT)
Dept: PHYSICAL THERAPY | Facility: HOSPITAL | Age: 64
Setting detail: THERAPIES SERIES
Discharge: HOME | End: 2019-10-02

## 2019-10-02 DIAGNOSIS — M51.36 DDD (DEGENERATIVE DISC DISEASE), LUMBAR: ICD-10-CM

## 2019-10-02 DIAGNOSIS — M54.41 RIGHT-SIDED LOW BACK PAIN WITH RIGHT-SIDED SCIATICA, UNSPECIFIED CHRONICITY: ICD-10-CM

## 2019-10-02 DIAGNOSIS — M54.30 SCIATICA, UNSPECIFIED LATERALITY: ICD-10-CM

## 2019-10-02 DIAGNOSIS — M25.462 PAIN AND SWELLING OF LEFT KNEE: Primary | ICD-10-CM

## 2019-10-02 DIAGNOSIS — M25.562 PAIN AND SWELLING OF LEFT KNEE: Primary | ICD-10-CM

## 2019-10-02 DIAGNOSIS — M54.10 RADICULOPATHY WITH LOWER EXTREMITY SYMPTOMS: ICD-10-CM

## 2019-10-02 PROCEDURE — 97012 MECHANICAL TRACTION THERAPY: CPT | Performed by: PHYSICAL THERAPIST

## 2019-10-02 PROCEDURE — 97110 THERAPEUTIC EXERCISES: CPT | Performed by: PHYSICAL THERAPIST

## 2019-10-02 NOTE — THERAPY TREATMENT NOTE
"    Outpatient Physical Therapy Ortho Treatment Note  [facility]     Patient Name: Celina Barry  : 1955  MRN: [id number]  Today's Date: 10/2/2019      Visit Date: 10/02/2019    Visit Dx:    ICD-10-CM ICD-9-CM   1. Pain and swelling of left knee M25.562 719.46    M25.462 719.06   2. Radiculopathy with lower extremity symptoms M54.10 724.4   3. Sciatica, unspecified laterality M54.30 724.3   4. Right-sided low back pain with right-sided sciatica, unspecified chronicity M54.41 724.3   5. DDD (degenerative disc disease), lumbar M51.36 722.52       Patient Active Problem List   Diagnosis   • Urinary frequency   • Acute midline low back pain without sciatica        Past Medical History:   Diagnosis Date   • Allergic    • Osteoarthritis    • Sciatica         Past Surgical History:   Procedure Laterality Date   • SHOULDER SURGERY Right    • SKIN CANCER EXCISION         PT Ortho     Row Name 10/02/19 1005       Subjective Comments    Subjective Comments  Patient reports that her back and left knee felt some better after last visit but had increased pain the next day.  Today patient is complaining of right sacral area pain and continues to report that her low back \"feels weak\"  Patient states that when she is bent forward she does not feel like she can stand back upright.   Patient continues to express multiple concerns over appointments that she has and being able to work.  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    -SP    Row Name 19 1130       Subjective Comments    Subjective Comments  Patient reports that her back feel \"tight and weak\" across lumbar area.  She continues to have left knee pain.  Patient reports that she is feeling pain with transfer sit to  hamstring area.    -SP      User Key  (r) = Recorded By, (t) = Taken By, (c) = Cosigned By    Initials Name Provider Type    Priscila Wiggins, PT Physical Therapist                      PT " "Assessment/Plan     Row Name 10/02/19 2510          PT Assessment    Assessment Comments  Patient reports that her back and right hip feel better after traction.    -SP        PT Plan    PT Plan Comments  Continue per POC  -SP       User Key  (r) = Recorded By, (t) = Taken By, (c) = Cosigned By    Initials Name Provider Type    Priscila Wiggins, PT Physical Therapist          Modalities     Row Name 10/02/19 1005             Moist Heat    MH Applied  Yes  -SP      Location  L/S area and left knee  -SP      Rx Minutes  12 mins  -SP      MH Prior to Rx  Yes  -SP         ELECTRICAL STIMULATION    Attended/Unattended  Unattended  -SP      Stimulation Type  IFC  -SP      Location/Electrode Placement/Other  left knee  -SP         Traction 65251    Traction Type  Lumbar  -SP      Rx Minutes  12 min  -SP      Position  Hook-lying 90/90  -SP      Weight  60  -SP      Hold  50  -SP      Relax  15  -SP        User Key  (r) = Recorded By, (t) = Taken By, (c) = Cosigned By    Initials Name Provider Type    Priscila Wiggins, PT Physical Therapist        OP Exercises     Row Name 10/02/19 1005             Subjective Comments    Subjective Comments  Patient reports that her back and left knee felt some better after last visit but had increased pain the next day.  Today patient is complaining of right sacral area pain and continues to report that her low back \"feels weak\"  Patient states that when she is bent forward she does not feel like she can stand back upright.   Patient continues to express multiple concerns over appointments that she has and being able to work.  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    -SP         Exercise 1    Exercise Name 1  SKTC (with towel for assist)  -SP      Cueing 1  Verbal;Tactile  -SP      Reps 1  3  -SP      Time 1  20 sec  -SP         Exercise 2    Exercise Name 2  piriformis stretch  -SP      Cueing 2  Verbal;Demo  -SP      Reps " 2  3  -SP      Time 2  20 sec  -SP         Exercise 3    Exercise Name 3  PPT  -SP      Cueing 3  Verbal;Demo  -SP      Reps 3  15  -SP      Time 3  5sec  -SP         Exercise 4    Exercise Name 4  PPT with ball squeeze  -SP      Cueing 4  Verbal;Tactile  -SP      Reps 4  15  -SP      Time 4  5sec  -SP         Exercise 5    Exercise Name 5  Hamstring stretch  -SP      Cueing 5  Verbal;Tactile  -SP      Reps 5  3  -SP      Time 5  20 sec  -SP         Exercise 6    Exercise Name 6  QS  -SP      Cueing 6  Verbal;Tactile  -SP         Exercise 7    Exercise Name 7  SLR  -SP      Cueing 7  Verbal;Tactile  -SP         Exercise 8    Exercise Name 8  SAQ  -SP      Cueing 8  Verbal;Tactile  -SP        User Key  (r) = Recorded By, (t) = Taken By, (c) = Cosigned By    Initials Name Provider Type    Priscila Wiggins, VILMA Physical Therapist                      Manual Rx (last 36 hours)      Manual Treatments     Row Name 10/02/19 1005             Manual Rx 1    Manual Rx 1 Location  right innominate  -SP      Manual Rx 1 Type  MET: pelvic clearing and right anterior innominate correct  -SP        User Key  (r) = Recorded By, (t) = Taken By, (c) = Cosigned By    Initials Name Provider Type    Priscila Wiggins, VILMA Physical Therapist              Therapy Education  Given: HEP, Posture/body mechanics  Program: Reinforced  How Provided: Verbal  Provided to: Patient  Level of Understanding: Verbalized, Demonstrated              Time Calculation:   Start Time: 1005  Stop Time: 1140  Time Calculation (min): 95 min  Therapy Charges for Today     Code Description Service Date Service Provider Modifiers Qty    41953691152 HC PT TRACTION LUMBAR 10/2/2019 Priscila Gonzalez, PT GP 1    04316536237 HC PT THER PROC EA 15 MIN 10/2/2019 Priscila Gonzalez, PT GP 1                    Priscila Gonzalez PT  10/2/2019

## 2019-10-08 ENCOUNTER — HOSPITAL ENCOUNTER (OUTPATIENT)
Dept: PHYSICAL THERAPY | Facility: HOSPITAL | Age: 64
Setting detail: THERAPIES SERIES
Discharge: HOME | End: 2019-10-08

## 2019-10-08 DIAGNOSIS — M54.30 SCIATICA, UNSPECIFIED LATERALITY: ICD-10-CM

## 2019-10-08 DIAGNOSIS — M51.36 DDD (DEGENERATIVE DISC DISEASE), LUMBAR: ICD-10-CM

## 2019-10-08 DIAGNOSIS — M54.41 RIGHT-SIDED LOW BACK PAIN WITH RIGHT-SIDED SCIATICA, UNSPECIFIED CHRONICITY: ICD-10-CM

## 2019-10-08 DIAGNOSIS — M25.562 PAIN AND SWELLING OF LEFT KNEE: Primary | ICD-10-CM

## 2019-10-08 DIAGNOSIS — M25.462 PAIN AND SWELLING OF LEFT KNEE: Primary | ICD-10-CM

## 2019-10-08 DIAGNOSIS — M54.10 RADICULOPATHY WITH LOWER EXTREMITY SYMPTOMS: ICD-10-CM

## 2019-10-08 PROCEDURE — 97012 MECHANICAL TRACTION THERAPY: CPT | Performed by: PHYSICAL THERAPIST

## 2019-10-08 PROCEDURE — G0283 ELEC STIM OTHER THAN WOUND: HCPCS | Performed by: PHYSICAL THERAPIST

## 2019-10-08 PROCEDURE — 97110 THERAPEUTIC EXERCISES: CPT | Performed by: PHYSICAL THERAPIST

## 2019-10-08 NOTE — THERAPY TREATMENT NOTE
Outpatient Physical Therapy Ortho Treatment Note  [place] [place]     Patient Name: Celina Barry  : 1955  MRN: [id number]  Today's Date: 10/8/2019      Visit Date: 10/08/2019    Visit Dx:    ICD-10-CM ICD-9-CM   1. Pain and swelling of left knee M25.562 719.46    M25.462 719.06   2. Radiculopathy with lower extremity symptoms M54.10 724.4   3. Sciatica, unspecified laterality M54.30 724.3   4. Right-sided low back pain with right-sided sciatica, unspecified chronicity M54.41 724.3   5. DDD (degenerative disc disease), lumbar M51.36 722.52       Patient Active Problem List   Diagnosis   • Urinary frequency   • Acute midline low back pain without sciatica        Past Medical History:   Diagnosis Date   • Allergic    • Osteoarthritis    • Sciatica         Past Surgical History:   Procedure Laterality Date   • SHOULDER SURGERY Right    • SKIN CANCER EXCISION         PT Ortho     Row Name 10/08/19 1042       Subjective Comments    Subjective Comments  Patient reports that she has felt a little better over the last few days.  She saw her MD and found that the itching she was having was an allergic reaction and she has been prescribed meds.  Patient states that her back felt better after traction last visit.  She continues to left medial knee pain but it has not felt as unstable  -SP      User Key  (r) = Recorded By, (t) = Taken By, (c) = Cosigned By    Initials Name Provider Type    Priscila Wiggins, PT Physical Therapist                      PT Assessment/Plan     Row Name 10/08/19 1153          PT Assessment    Assessment Comments  Patient with some improvement in symptoms with traction.  Overall pain level decreased today.   -SP        PT Plan    PT Plan Comments  Continue to progress as tolerable  -SP       User Key  (r) = Recorded By, (t) = Taken By, (c) = Cosigned By    Initials Name Provider Type    Priscila Wiggins, PT Physical Therapist          Modalities     Row Name 10/08/19 0493              Moist Heat    MH Applied  Yes  -SP      Location  L/S area and left knee  -SP      Rx Minutes  12 mins  -SP      MH Prior to Rx  Yes  -SP         ELECTRICAL STIMULATION    Attended/Unattended  Unattended  -SP      Stimulation Type  IFC  -SP      Location/Electrode Placement/Other  left knee  -SP         Traction 30981    Traction Type  Lumbar  -SP      Rx Minutes  12 min  -SP      Position  Hook-lying 90/90  -SP      Weight  60  -SP      Hold  50  -SP      Relax  15  -SP        User Key  (r) = Recorded By, (t) = Taken By, (c) = Cosigned By    Initials Name Provider Type    Priscila Wiggins, PT Physical Therapist        OP Exercises     Row Name 10/08/19 1042             Subjective Comments    Subjective Comments  Patient reports that she has felt a little better over the last few days.  She saw her MD and found that the itching she was having was an allergic reaction and she has been prescribed meds.  Patient states that her back felt better after traction last visit.  She continues to left medial knee pain but it has not felt as unstable  -SP         Exercise 1    Exercise Name 1  SKTC (with towel for assist)  -SP      Cueing 1  Verbal;Tactile  -SP      Reps 1  3  -SP      Time 1  20 sec  -SP         Exercise 2    Exercise Name 2  piriformis stretch  -SP      Cueing 2  Verbal;Demo  -SP      Reps 2  3  -SP      Time 2  20 sec  -SP         Exercise 3    Exercise Name 3  PPT  -SP      Cueing 3  Verbal;Demo  -SP      Reps 3  15  -SP      Time 3  5sec  -SP         Exercise 4    Exercise Name 4  PPT with ball squeeze  -SP      Cueing 4  Verbal;Tactile  -SP      Reps 4  15  -SP      Time 4  5sec  -SP         Exercise 5    Exercise Name 5  Hamstring stretch  -SP      Cueing 5  Verbal;Tactile  -SP      Reps 5  3  -SP      Time 5  20 sec  -SP         Exercise 6    Exercise Name 6  QS with ball squeeze  -SP      Cueing 6  Verbal;Tactile  -SP      Reps 6  10   -SP      Time 6  5 sec  -SP         Exercise 7     Exercise Name 7  SLR  -SP      Cueing 7  Verbal;Tactile  -SP         Exercise 8    Exercise Name 8  SAQ with ball squeeze  -SP      Cueing 8  Verbal;Tactile  -SP      Reps 8  10  -SP        User Key  (r) = Recorded By, (t) = Taken By, (c) = Cosigned By    Initials Name Provider Type    Priscila Wiggins, PT Physical Therapist                      Manual Rx (last 36 hours)      Manual Treatments     Row Name 10/08/19 1042             Manual Rx 1    Manual Rx 1 Location  right innominate  -SP      Manual Rx 1 Type  MET: pelvic clearing and right anterior innominate correct  -SP        User Key  (r) = Recorded By, (t) = Taken By, (c) = Cosigned By    Initials Name Provider Type    Priscila Wiggins, PT Physical Therapist              Therapy Education  Given: HEP  Program: Reinforced, Modified  How Provided: Verbal  Provided to: Patient  Level of Understanding: Verbalized, Demonstrated              Time Calculation:   Start Time: 1042  Stop Time: 1207  Time Calculation (min): 85 min  Therapy Charges for Today     Code Description Service Date Service Provider Modifiers Qty    21145432158 HC PT ELECTRICAL STIM UNATTENDED 10/8/2019 Priscila Gonzalez, PT  1    16775619735 HC PT TRACTION LUMBAR 10/8/2019 Priscila Gonzalez, PT GP 1    98261709496 HC PT THER PROC EA 15 MIN 10/8/2019 Priscila Gonzalez, PT GP 1                    Priscila Gonzalez, PT  10/8/2019

## 2019-10-10 ENCOUNTER — HOSPITAL ENCOUNTER (OUTPATIENT)
Dept: PHYSICAL THERAPY | Facility: HOSPITAL | Age: 64
Setting detail: THERAPIES SERIES
Discharge: HOME | End: 2019-10-10

## 2019-10-10 DIAGNOSIS — M54.41 RIGHT-SIDED LOW BACK PAIN WITH RIGHT-SIDED SCIATICA, UNSPECIFIED CHRONICITY: ICD-10-CM

## 2019-10-10 DIAGNOSIS — M51.36 DDD (DEGENERATIVE DISC DISEASE), LUMBAR: ICD-10-CM

## 2019-10-10 DIAGNOSIS — M25.562 PAIN AND SWELLING OF LEFT KNEE: Primary | ICD-10-CM

## 2019-10-10 DIAGNOSIS — M25.462 PAIN AND SWELLING OF LEFT KNEE: Primary | ICD-10-CM

## 2019-10-10 DIAGNOSIS — M54.30 SCIATICA, UNSPECIFIED LATERALITY: ICD-10-CM

## 2019-10-10 DIAGNOSIS — M54.10 RADICULOPATHY WITH LOWER EXTREMITY SYMPTOMS: ICD-10-CM

## 2019-10-10 PROCEDURE — G0283 ELEC STIM OTHER THAN WOUND: HCPCS | Performed by: PHYSICAL THERAPIST

## 2019-10-10 PROCEDURE — 97012 MECHANICAL TRACTION THERAPY: CPT | Performed by: PHYSICAL THERAPIST

## 2019-10-10 PROCEDURE — 97110 THERAPEUTIC EXERCISES: CPT | Performed by: PHYSICAL THERAPIST

## 2019-10-10 NOTE — THERAPY TREATMENT NOTE
"    Outpatient Physical Therapy Ortho Treatment Note  [facility]     Patient Name: Celina Barry  : 1955  MRN: [id number]  Today's Date: 10/10/2019      Visit Date: 10/10/2019    Visit Dx:    ICD-10-CM ICD-9-CM   1. Pain and swelling of left knee M25.562 719.46    M25.462 719.06   2. Radiculopathy with lower extremity symptoms M54.10 724.4   3. Sciatica, unspecified laterality M54.30 724.3   4. Right-sided low back pain with right-sided sciatica, unspecified chronicity M54.41 724.3   5. DDD (degenerative disc disease), lumbar M51.36 722.52       Patient Active Problem List   Diagnosis   • Urinary frequency   • Acute midline low back pain without sciatica        Past Medical History:   Diagnosis Date   • Allergic    • Osteoarthritis    • Sciatica         Past Surgical History:   Procedure Laterality Date   • SHOULDER SURGERY Right    • SKIN CANCER EXCISION         PT Ortho     Row Name 10/10/19 1040       Subjective Comments    Subjective Comments  Patient reports that she had increased pain in her left knee and her back following last visit but by the next day. she was feeling much better.  She reports that she is feeling good today and states that her back does not feel \"as weak\"  -SP    Row Name 10/08/19 1042       Subjective Comments    Subjective Comments  Patient reports that she has felt a little better over the last few days.  She saw her MD and found that the itching she was having was an allergic reaction and she has been prescribed meds.  Patient states that her back felt better after traction last visit.  She continues to left medial knee pain but it has not felt as unstable  -SP      User Key  (r) = Recorded By, (t) = Taken By, (c) = Cosigned By    Initials Name Provider Type    Priscila Wiggins, PT Physical Therapist                      PT Assessment/Plan     Row Name 10/10/19 5916          PT Assessment    Assessment Comments  Patient reports decreased pain and exhibits improved " "tolerance for exercise this visit.  Patient encouraged to be compliant with HEP  -SP        PT Plan    PT Plan Comments  Continue to progress as tolerable  -SP       User Key  (r) = Recorded By, (t) = Taken By, (c) = Cosigned By    Initials Name Provider Type    Priscila Wiggins, PT Physical Therapist          Modalities     Row Name 10/10/19 1040             Moist Heat    MH Applied  Yes  -SP      Location  L/S area and left knee  -SP      Rx Minutes  12 mins  -SP      MH Prior to Rx  Yes  -SP         ELECTRICAL STIMULATION    Attended/Unattended  Unattended  -SP      Stimulation Type  IFC  -SP      Location/Electrode Placement/Other  left knee  -SP         Traction 13222    Traction Type  Lumbar  -SP      Rx Minutes  12 min  -SP      Position  Hook-lying 90/90  -SP      Weight  60  -SP      Hold  50  -SP      Relax  15  -SP        User Key  (r) = Recorded By, (t) = Taken By, (c) = Cosigned By    Initials Name Provider Type    Priscila Wiggins, PT Physical Therapist        OP Exercises     Row Name 10/10/19 1040             Subjective Comments    Subjective Comments  Patient reports that she had increased pain in her left knee and her back following last visit but by the next day. she was feeling much better.  She reports that she is feeling good today and states that her back does not feel \"as weak\"  -SP         Exercise 1    Exercise Name 1  SKTC (with towel for assist)  -SP      Cueing 1  Verbal;Tactile  -SP      Reps 1  3  -SP      Time 1  20 sec  -SP         Exercise 2    Exercise Name 2  piriformis stretch  -SP      Cueing 2  Verbal;Demo  -SP      Reps 2  3  -SP      Time 2  20 sec  -SP         Exercise 3    Exercise Name 3  PPT  -SP      Cueing 3  Verbal;Demo  -SP      Reps 3  15  -SP      Time 3  5sec  -SP         Exercise 4    Exercise Name 4  PPT with ball squeeze  -SP      Cueing 4  Verbal;Tactile  -SP      Reps 4  15  -SP      Time 4  5sec  -SP         Exercise 5    Exercise Name 5 "  Hamstring stretch  -SP      Cueing 5  Verbal;Tactile  -SP      Reps 5  3  -SP      Time 5  20 sec  -SP         Exercise 6    Exercise Name 6  QS with ball squeeze  -SP      Cueing 6  Verbal;Tactile  -SP      Reps 6  10   -SP      Time 6  5 sec  -SP         Exercise 7    Exercise Name 7  SLR  -SP      Cueing 7  Verbal;Tactile  -SP      Sets 7  2  -SP      Reps 7  10  -SP         Exercise 8    Exercise Name 8  SAQ with ball squeeze  -SP      Cueing 8  Verbal;Tactile  -SP      Reps 8  15  -SP      Time 8  3 sec  -SP      Additional Comments  1#  -SP        User Key  (r) = Recorded By, (t) = Taken By, (c) = Cosigned By    Initials Name Provider Type    Priscila Wiggins, VILMA Physical Therapist                      Manual Rx (last 36 hours)      Manual Treatments     Row Name 10/10/19 1040             Manual Rx 1    Manual Rx 1 Location  right innominate  -SP      Manual Rx 1 Type  MET: pelvic clearing and right anterior innominate correct  -SP        User Key  (r) = Recorded By, (t) = Taken By, (c) = Cosigned By    Initials Name Provider Type    Priscila Wiggins, VILMA Physical Therapist              Therapy Education  Given: HEP  Program: Reinforced  How Provided: Verbal  Provided to: Patient  Level of Understanding: Verbalized, Demonstrated              Time Calculation:   Start Time: 1040  Stop Time: 1205  Time Calculation (min): 85 min  Therapy Charges for Today     Code Description Service Date Service Provider Modifiers Qty    49952187989 HC PT TRACTION LUMBAR 10/10/2019 Priscila Gonzalez, PT GP 1    02985643771 HC PT ELECTRICAL STIM UNATTENDED 10/10/2019 Priscila Gonzalez, PT  1    90681875651 HC PT THER PROC EA 15 MIN 10/10/2019 Priscila Gonzalez, PT GP 1                    Priscila Gonzalez PT  10/10/2019

## 2019-10-15 ENCOUNTER — HOSPITAL ENCOUNTER (OUTPATIENT)
Dept: PHYSICAL THERAPY | Facility: HOSPITAL | Age: 64
Setting detail: THERAPIES SERIES
Discharge: HOME | End: 2019-10-15

## 2019-10-15 DIAGNOSIS — M25.462 PAIN AND SWELLING OF LEFT KNEE: Primary | ICD-10-CM

## 2019-10-15 DIAGNOSIS — M54.41 RIGHT-SIDED LOW BACK PAIN WITH RIGHT-SIDED SCIATICA, UNSPECIFIED CHRONICITY: ICD-10-CM

## 2019-10-15 DIAGNOSIS — M54.30 SCIATICA, UNSPECIFIED LATERALITY: ICD-10-CM

## 2019-10-15 DIAGNOSIS — M25.562 PAIN AND SWELLING OF LEFT KNEE: Primary | ICD-10-CM

## 2019-10-15 DIAGNOSIS — M54.10 RADICULOPATHY WITH LOWER EXTREMITY SYMPTOMS: ICD-10-CM

## 2019-10-15 DIAGNOSIS — M51.36 DDD (DEGENERATIVE DISC DISEASE), LUMBAR: ICD-10-CM

## 2019-10-15 PROCEDURE — G0283 ELEC STIM OTHER THAN WOUND: HCPCS | Performed by: PHYSICAL THERAPIST

## 2019-10-15 PROCEDURE — 97012 MECHANICAL TRACTION THERAPY: CPT | Performed by: PHYSICAL THERAPIST

## 2019-10-15 PROCEDURE — 97110 THERAPEUTIC EXERCISES: CPT | Performed by: PHYSICAL THERAPIST

## 2019-10-15 NOTE — THERAPY TREATMENT NOTE
Outpatient Physical Therapy Ortho Treatment Note  GERRY Perez     Patient Name: Celina Barry  : 1955  MRN: [id number]  Today's Date: 10/15/2019      Visit Date: 10/15/2019    Visit Dx:    ICD-10-CM ICD-9-CM   1. Pain and swelling of left knee M25.562 719.46    M25.462 719.06   2. Radiculopathy with lower extremity symptoms M54.10 724.4   3. Sciatica, unspecified laterality M54.30 724.3   4. Right-sided low back pain with right-sided sciatica, unspecified chronicity M54.41 724.3   5. DDD (degenerative disc disease), lumbar M51.36 722.52       Patient Active Problem List   Diagnosis   • Urinary frequency   • Acute midline low back pain without sciatica        Past Medical History:   Diagnosis Date   • Allergic    • Osteoarthritis    • Sciatica         Past Surgical History:   Procedure Laterality Date   • SHOULDER SURGERY Right    • SKIN CANCER EXCISION         PT Ortho     Row Name 10/15/19 1900       Subjective Comments    Subjective Comments  Patient reports that she is feeling better.  She continues to have issues with dental work and is concerned that infection in her mouth is causing the symptoms throughout her body.    -SP      User Key  (r) = Recorded By, (t) = Taken By, (c) = Cosigned By    Initials Name Provider Type    Priscila Wiggins, PT Physical Therapist                      PT Assessment/Plan     Row Name 10/15/19 1913          PT Assessment    Assessment Comments  Patient with decreased complaints of back and LE pain.    -SP        PT Plan    PT Plan Comments  Continue per POC  -SP       User Key  (r) = Recorded By, (t) = Taken By, (c) = Cosigned By    Initials Name Provider Type    Priscila Wiggins, PT Physical Therapist          Modalities     Row Name 10/15/19 1900             Moist Heat    MH Applied  Yes  -SP      Location  L/S area and left knee  -SP      Rx Minutes  12 mins  -SP      MH Prior to Rx  Yes  -SP         ELECTRICAL STIMULATION    Attended/Unattended   Unattended  -SP      Stimulation Type  IFC  -SP      Location/Electrode Placement/Other  left knee  -SP         Traction 50839    Traction Type  Lumbar  -SP      Rx Minutes  12 min  -SP      Position  Hook-lying 90/90  -SP      Weight  60  -SP      Hold  50  -SP      Relax  15  -SP        User Key  (r) = Recorded By, (t) = Taken By, (c) = Cosigned By    Initials Name Provider Type    Priscila Wiggins, PT Physical Therapist        OP Exercises     Row Name 10/15/19 3320             Subjective Comments    Subjective Comments  Patient reports that she is feeling better.  She continues to have issues with dental work and is concerned that infection in her mouth is causing the symptoms throughout her body.    -SP         Exercise 1    Exercise Name 1  SKTC (with towel for assist)  -SP      Cueing 1  Verbal;Tactile  -SP      Reps 1  3  -SP      Time 1  20 sec  -SP         Exercise 2    Exercise Name 2  piriformis stretch  -SP      Cueing 2  Verbal;Demo  -SP      Reps 2  3  -SP      Time 2  20 sec  -SP         Exercise 3    Exercise Name 3  PPT  -SP      Cueing 3  Verbal;Demo  -SP      Reps 3  15  -SP      Time 3  5sec  -SP         Exercise 4    Exercise Name 4  PPT with ball squeeze  -SP      Cueing 4  Verbal;Tactile  -SP      Reps 4  15  -SP      Time 4  5sec  -SP         Exercise 5    Exercise Name 5  Hamstring stretch  -SP      Cueing 5  Verbal;Tactile  -SP      Reps 5  3  -SP      Time 5  20 sec  -SP         Exercise 6    Exercise Name 6  QS with ball squeeze  -SP      Cueing 6  Verbal;Tactile  -SP      Reps 6  10   -SP      Time 6  5 sec  -SP         Exercise 7    Exercise Name 7  SLR  -SP      Cueing 7  Verbal;Tactile  -SP      Sets 7  2  -SP      Reps 7  10  -SP         Exercise 8    Exercise Name 8  SAQ with ball squeeze  -SP      Cueing 8  Verbal;Tactile  -SP      Reps 8  15  -SP      Time 8  3 sec  -SP        User Key  (r) = Recorded By, (t) = Taken By, (c) = Cosigned By    Initials Name Provider Type     Priscila Wiggins, PT Physical Therapist                      Manual Rx (last 36 hours)      Manual Treatments     Row Name 10/15/19 1900             Manual Rx 1    Manual Rx 1 Location  right innominate  -SP      Manual Rx 1 Type  MET: pelvic clearing and right anterior innominate correct  -SP        User Key  (r) = Recorded By, (t) = Taken By, (c) = Cosigned By    Initials Name Provider Type    Priscila Wiggins, PT Physical Therapist                             Time Calculation:   Start Time: 1505  Stop Time: 1630  Time Calculation (min): 85 min  Therapy Charges for Today     Code Description Service Date Service Provider Modifiers Qty    40504019213 HC PT TRACTION LUMBAR 10/15/2019 Priscila Gonzalez, PT GP 1    20292414819 HC PT THER PROC EA 15 MIN 10/15/2019 Priscila Gonzalez, PT GP 1    30084401667 HC PT ELECTRICAL STIM UNATTENDED 10/15/2019 Priscila Gonzalez, PT  1                    Priscila Gonzalez, PT  10/15/2019

## 2019-10-17 ENCOUNTER — APPOINTMENT (OUTPATIENT)
Dept: PHYSICAL THERAPY | Facility: HOSPITAL | Age: 64
End: 2019-10-17

## 2019-10-22 ENCOUNTER — HOSPITAL ENCOUNTER (OUTPATIENT)
Dept: PHYSICAL THERAPY | Facility: HOSPITAL | Age: 64
Setting detail: THERAPIES SERIES
Discharge: HOME | End: 2019-10-22

## 2019-10-22 DIAGNOSIS — M25.562 PAIN AND SWELLING OF LEFT KNEE: Primary | ICD-10-CM

## 2019-10-22 DIAGNOSIS — M54.41 RIGHT-SIDED LOW BACK PAIN WITH RIGHT-SIDED SCIATICA, UNSPECIFIED CHRONICITY: ICD-10-CM

## 2019-10-22 DIAGNOSIS — M51.36 DDD (DEGENERATIVE DISC DISEASE), LUMBAR: ICD-10-CM

## 2019-10-22 DIAGNOSIS — M25.462 PAIN AND SWELLING OF LEFT KNEE: Primary | ICD-10-CM

## 2019-10-22 DIAGNOSIS — M54.30 SCIATICA, UNSPECIFIED LATERALITY: ICD-10-CM

## 2019-10-22 DIAGNOSIS — M54.10 RADICULOPATHY WITH LOWER EXTREMITY SYMPTOMS: ICD-10-CM

## 2019-10-22 PROCEDURE — 97110 THERAPEUTIC EXERCISES: CPT | Performed by: PHYSICAL THERAPIST

## 2019-10-22 PROCEDURE — 97012 MECHANICAL TRACTION THERAPY: CPT | Performed by: PHYSICAL THERAPIST

## 2019-10-22 PROCEDURE — G0283 ELEC STIM OTHER THAN WOUND: HCPCS | Performed by: PHYSICAL THERAPIST

## 2019-10-22 NOTE — THERAPY TREATMENT NOTE
Outpatient Physical Therapy Ortho Treatment Note  GERRY Perez     Patient Name: Celina Barry  : 1955  MRN: [id number]  Today's Date: 10/22/2019      Visit Date: 10/22/2019    Visit Dx:    ICD-10-CM ICD-9-CM   1. Pain and swelling of left knee M25.562 719.46    M25.462 719.06   2. Radiculopathy with lower extremity symptoms M54.10 724.4   3. Sciatica, unspecified laterality M54.30 724.3   4. Right-sided low back pain with right-sided sciatica, unspecified chronicity M54.41 724.3   5. DDD (degenerative disc disease), lumbar M51.36 722.52       Patient Active Problem List   Diagnosis   • Urinary frequency   • Acute midline low back pain without sciatica        Past Medical History:   Diagnosis Date   • Allergic    • Osteoarthritis    • Sciatica         Past Surgical History:   Procedure Laterality Date   • SHOULDER SURGERY Right    • SKIN CANCER EXCISION         PT Ortho     Row Name 10/22/19 1800       Subjective Comments    Subjective Comments  Patient reports that her back is better but still with intermittent feelings of weakness.  She states her knee continues to have intermittent pain and some instability  -SP      User Key  (r) = Recorded By, (t) = Taken By, (c) = Cosigned By    Initials Name Provider Type    Priscila Wiggins, PT Physical Therapist                      PT Assessment/Plan     Row Name 10/22/19 3669          PT Assessment    Assessment Comments  Patient fatigues easily with exercise.  Continues to reports decreased lumbar area discomfort following traction.  -SP        PT Plan    PT Plan Comments  Continue to progress strengthening as tolerable  -SP       User Key  (r) = Recorded By, (t) = Taken By, (c) = Cosigned By    Initials Name Provider Type    Priscila Wiggins, PT Physical Therapist          Modalities     Row Name 10/22/19 1800             Moist Heat    MH Applied  Yes  -SP      Location  L/S area and left knee  -SP      Rx Minutes  12 mins  -SP      MH Prior  to Rx  Yes  -SP         ELECTRICAL STIMULATION    Attended/Unattended  Unattended  -SP      Stimulation Type  IFC  -SP      Location/Electrode Placement/Other  left knee  -SP         Traction 50147    Traction Type  Lumbar  -SP      Rx Minutes  12 min  -SP      Position  Hook-lying 90/90  -SP      Weight  60  -SP      Hold  50  -SP      Relax  15  -SP        User Key  (r) = Recorded By, (t) = Taken By, (c) = Cosigned By    Initials Name Provider Type    Priscila Wiggins, PT Physical Therapist        OP Exercises     Row Name 10/22/19 1800             Subjective Comments    Subjective Comments  Patient reports that her back is better but still with intermittent feelings of weakness.  She states her knee continues to have intermittent pain and some instability  -SP         Exercise 1    Exercise Name 1  SKTC (with towel for assist)  -SP      Cueing 1  Verbal;Tactile  -SP      Reps 1  3  -SP      Time 1  20 sec  -SP         Exercise 2    Exercise Name 2  piriformis stretch  -SP      Cueing 2  Verbal;Demo  -SP      Reps 2  3  -SP      Time 2  20 sec  -SP         Exercise 3    Exercise Name 3  PPT  -SP      Cueing 3  Verbal;Demo  -SP      Reps 3  15  -SP      Time 3  5sec  -SP         Exercise 4    Exercise Name 4  PPT with ball squeeze  -SP      Cueing 4  Verbal;Tactile  -SP      Reps 4  15  -SP      Time 4  5sec  -SP         Exercise 5    Exercise Name 5  Hamstring stretch  -SP      Cueing 5  Verbal;Tactile  -SP      Reps 5  3  -SP      Time 5  20 sec  -SP         Exercise 6    Exercise Name 6  QS with ball squeeze  -SP      Cueing 6  Verbal;Tactile  -SP      Reps 6  10   -SP      Time 6  5 sec  -SP         Exercise 7    Exercise Name 7  SLR  -SP      Cueing 7  Verbal;Tactile  -SP      Sets 7  2  -SP      Reps 7  10  -SP         Exercise 8    Exercise Name 8  SAQ with ball squeeze  -SP      Cueing 8  Verbal;Tactile  -SP      Reps 8  15  -SP      Time 8  3 sec  -SP        User Key  (r) = Recorded By, (t) =  Taken By, (c) = Cosigned By    Initials Name Provider Type    Priscila Wiggins, PT Physical Therapist                      Manual Rx (last 36 hours)      Manual Treatments     Row Name 10/22/19 1800             Manual Rx 1    Manual Rx 1 Location  right innominate  -SP      Manual Rx 1 Type  MET: pelvic clearing and right anterior innominate correct  -SP        User Key  (r) = Recorded By, (t) = Taken By, (c) = Cosigned By    Initials Name Provider Type    Priscila Wiggins, PT Physical Therapist              Therapy Education  Given: HEP  Program: Reinforced  How Provided: Verbal  Provided to: Patient  Level of Understanding: Verbalized, Demonstrated              Time Calculation:   Start Time: 1645  Stop Time: 1748  Time Calculation (min): 63 min  Therapy Charges for Today     Code Description Service Date Service Provider Modifiers Qty    85705722986 HC PT ELECTRICAL STIM UNATTENDED 10/22/2019 Priscila Gonzalez, PT  1    36981667747 HC PT TRACTION LUMBAR 10/22/2019 Priscila Gonzalez, PT GP 1    95307722098 HC PT THER PROC EA 15 MIN 10/22/2019 Priscila Gonzalez, PT GP 1                    Priscila Gonzalez, PT  10/22/2019

## 2019-10-24 ENCOUNTER — HOSPITAL ENCOUNTER (OUTPATIENT)
Dept: PHYSICAL THERAPY | Facility: HOSPITAL | Age: 64
Setting detail: THERAPIES SERIES
Discharge: HOME | End: 2019-10-24

## 2019-10-24 DIAGNOSIS — M51.36 DDD (DEGENERATIVE DISC DISEASE), LUMBAR: ICD-10-CM

## 2019-10-24 DIAGNOSIS — M25.562 PAIN AND SWELLING OF LEFT KNEE: Primary | ICD-10-CM

## 2019-10-24 DIAGNOSIS — M25.462 PAIN AND SWELLING OF LEFT KNEE: Primary | ICD-10-CM

## 2019-10-24 DIAGNOSIS — M54.30 SCIATICA, UNSPECIFIED LATERALITY: ICD-10-CM

## 2019-10-24 DIAGNOSIS — M54.10 RADICULOPATHY WITH LOWER EXTREMITY SYMPTOMS: ICD-10-CM

## 2019-10-24 DIAGNOSIS — M54.41 RIGHT-SIDED LOW BACK PAIN WITH RIGHT-SIDED SCIATICA, UNSPECIFIED CHRONICITY: ICD-10-CM

## 2019-10-24 PROCEDURE — 97012 MECHANICAL TRACTION THERAPY: CPT | Performed by: PHYSICAL THERAPIST

## 2019-10-24 PROCEDURE — G0283 ELEC STIM OTHER THAN WOUND: HCPCS | Performed by: PHYSICAL THERAPIST

## 2019-10-24 PROCEDURE — 97110 THERAPEUTIC EXERCISES: CPT | Performed by: PHYSICAL THERAPIST

## 2019-10-24 NOTE — THERAPY TREATMENT NOTE
Outpatient Physical Therapy Ortho Treatment Note  GERRY McintoshAnthony     Patient Name: Celina Barry  : 1955  MRN: [id number]  Today's Date: 10/24/2019      Visit Date: 10/24/2019    Visit Dx:    ICD-10-CM ICD-9-CM   1. Pain and swelling of left knee M25.562 719.46    M25.462 719.06   2. Radiculopathy with lower extremity symptoms M54.10 724.4   3. Sciatica, unspecified laterality M54.30 724.3   4. Right-sided low back pain with right-sided sciatica, unspecified chronicity M54.41 724.3   5. DDD (degenerative disc disease), lumbar M51.36 722.52       Patient Active Problem List   Diagnosis   • Urinary frequency   • Acute midline low back pain without sciatica        Past Medical History:   Diagnosis Date   • Allergic    • Osteoarthritis    • Sciatica         Past Surgical History:   Procedure Laterality Date   • SHOULDER SURGERY Right    • SKIN CANCER EXCISION         PT Ortho     Row Name 10/24/19 1505       Subjective Comments    Subjective Comments  Patient reports that she had to walk for a prolonged period and ride in car for prolonged period yesterday.  She has had increased pain in her left medial knee area and low back.    -SP    Row Name 10/22/19 1800       Subjective Comments    Subjective Comments  Patient reports that her back is better but still with intermittent feelings of weakness.  She states her knee continues to have intermittent pain and some instability  -SP      User Key  (r) = Recorded By, (t) = Taken By, (c) = Cosigned By    Initials Name Provider Type    Priscila Wiggins, PT Physical Therapist                      PT Assessment/Plan     Row Name 10/24/19 5117          PT Assessment    Assessment Comments  Patient continues to have fatigue easily with ther-ex.  She presents today with increased pain following prolonged weight bearing activity.  -SP        PT Plan    PT Plan Comments  Continue to progress as tolerable  -SP       User Key  (r) = Recorded By, (t) = Taken By, (c) =  Cosigned By    Initials Name Provider Type    Priscila Wiggins, PT Physical Therapist          Modalities     Row Name 10/24/19 1505             Moist Heat    MH Applied  Yes  -SP      Location  L/S area and left knee  -SP      Rx Minutes  12 mins  -SP      MH Prior to Rx  Yes  -SP         ELECTRICAL STIMULATION    Attended/Unattended  Unattended  -SP      Stimulation Type  IFC  -SP      Location/Electrode Placement/Other  left knee  -SP         Traction 77972    Traction Type  Lumbar  -SP      Rx Minutes  12 min  -SP      Position  Hook-lying 90/90  -SP      Weight  60  -SP      Hold  50  -SP      Relax  15  -SP        User Key  (r) = Recorded By, (t) = Taken By, (c) = Cosigned By    Initials Name Provider Type    Priscila Wiggins, PT Physical Therapist        OP Exercises     Row Name 10/24/19 1508             Subjective Comments    Subjective Comments  Patient reports that she had to walk for a prolonged period and ride in car for prolonged period yesterday.  She has had increased pain in her left medial knee area and low back.    -SP         Exercise 1    Exercise Name 1  SKTC (with towel for assist)  -SP      Cueing 1  Verbal;Tactile  -SP      Reps 1  3  -SP      Time 1  20 sec  -SP         Exercise 2    Exercise Name 2  piriformis stretch  -SP      Cueing 2  Verbal;Demo  -SP      Reps 2  3  -SP      Time 2  20 sec  -SP         Exercise 3    Exercise Name 3  PPT  -SP      Cueing 3  Verbal;Demo  -SP      Reps 3  15  -SP      Time 3  5sec  -SP         Exercise 4    Exercise Name 4  PPT with ball squeeze  -SP      Cueing 4  Verbal;Tactile  -SP      Reps 4  15  -SP      Time 4  5sec  -SP         Exercise 5    Exercise Name 5  Hamstring stretch  -SP      Cueing 5  Verbal;Tactile  -SP      Reps 5  3  -SP      Time 5  20 sec  -SP         Exercise 6    Exercise Name 6  QS with ball squeeze  -SP      Cueing 6  Verbal;Tactile  -SP      Reps 6  10   -SP      Time 6  5 sec  -SP         Exercise 7     Exercise Name 7  SLR  -SP      Cueing 7  Verbal;Tactile  -SP      Sets 7  2  -SP      Reps 7  10  -SP         Exercise 8    Exercise Name 8  SAQ with ball squeeze  -SP      Cueing 8  Verbal;Tactile  -SP      Reps 8  15  -SP      Time 8  3 sec  -SP        User Key  (r) = Recorded By, (t) = Taken By, (c) = Cosigned By    Initials Name Provider Type    Priscila Wiggins, VILMA Physical Therapist                      Manual Rx (last 36 hours)      Manual Treatments     Row Name 10/24/19 1505             Manual Rx 1    Manual Rx 1 Location  right innominate  -SP      Manual Rx 1 Type  MET: pelvic clearing and right anterior innominate correct  -SP        User Key  (r) = Recorded By, (t) = Taken By, (c) = Cosigned By    Initials Name Provider Type    Priscila Wiggins, PT Physical Therapist              Therapy Education  Given: HEP  Program: Reinforced  How Provided: Verbal  Provided to: Patient  Level of Understanding: Verbalized, Demonstrated              Time Calculation:   Start Time: 1505  Stop Time: 1628  Time Calculation (min): 83 min  Therapy Charges for Today     Code Description Service Date Service Provider Modifiers Qty    80072407814 HC PT TRACTION LUMBAR 10/24/2019 Priscila Gonazlez, PT GP 1    34948984511 HC PT THER PROC EA 15 MIN 10/24/2019 Priscila Gonzalez, PT GP 1    20814071437 HC PT ELECTRICAL STIM UNATTENDED 10/24/2019 Priscila Gonzalez, PT  1                    Priscila Gonzalez, PT  10/24/2019

## 2019-10-29 ENCOUNTER — HOSPITAL ENCOUNTER (OUTPATIENT)
Dept: PHYSICAL THERAPY | Facility: HOSPITAL | Age: 64
Setting detail: THERAPIES SERIES
Discharge: HOME | End: 2019-10-29

## 2019-10-29 DIAGNOSIS — M51.36 DDD (DEGENERATIVE DISC DISEASE), LUMBAR: ICD-10-CM

## 2019-10-29 DIAGNOSIS — M25.562 PAIN AND SWELLING OF LEFT KNEE: Primary | ICD-10-CM

## 2019-10-29 DIAGNOSIS — M54.30 SCIATICA, UNSPECIFIED LATERALITY: ICD-10-CM

## 2019-10-29 DIAGNOSIS — M25.462 PAIN AND SWELLING OF LEFT KNEE: Primary | ICD-10-CM

## 2019-10-29 DIAGNOSIS — M54.41 RIGHT-SIDED LOW BACK PAIN WITH RIGHT-SIDED SCIATICA, UNSPECIFIED CHRONICITY: ICD-10-CM

## 2019-10-29 DIAGNOSIS — M54.10 RADICULOPATHY WITH LOWER EXTREMITY SYMPTOMS: ICD-10-CM

## 2019-10-29 PROCEDURE — 97110 THERAPEUTIC EXERCISES: CPT | Performed by: PHYSICAL THERAPIST

## 2019-10-29 PROCEDURE — G0283 ELEC STIM OTHER THAN WOUND: HCPCS | Performed by: PHYSICAL THERAPIST

## 2019-10-29 PROCEDURE — 97012 MECHANICAL TRACTION THERAPY: CPT | Performed by: PHYSICAL THERAPIST

## 2019-10-29 NOTE — THERAPY TREATMENT NOTE
Outpatient Physical Therapy Ortho Treatment Note  GERRY McintoshHitchcock     Patient Name: Celina Barry  : 1955  MRN: [id number]  Today's Date: 10/29/2019      Visit Date: 10/29/2019    Visit Dx:    ICD-10-CM ICD-9-CM   1. Pain and swelling of left knee M25.562 719.46    M25.462 719.06   2. Radiculopathy with lower extremity symptoms M54.10 724.4   3. Sciatica, unspecified laterality M54.30 724.3   4. Right-sided low back pain with right-sided sciatica, unspecified chronicity M54.41 724.3   5. DDD (degenerative disc disease), lumbar M51.36 722.52       Patient Active Problem List   Diagnosis   • Urinary frequency   • Acute midline low back pain without sciatica        Past Medical History:   Diagnosis Date   • Allergic    • Osteoarthritis    • Sciatica         Past Surgical History:   Procedure Laterality Date   • SHOULDER SURGERY Right    • SKIN CANCER EXCISION         PT Ortho     Row Name 10/29/19 1435       Subjective Comments    Subjective Comments  Patient reports that she continues to have pain with any prolonged walking or with riding in car.  She reports that she is feeling better today.  -SP      User Key  (r) = Recorded By, (t) = Taken By, (c) = Cosigned By    Initials Name Provider Type    Priscila Wiggins, PT Physical Therapist                      PT Assessment/Plan     Row Name 10/29/19 174 10/29/19 6831       PT Assessment    Assessment Comments  --  Patient continues to need cues for exercises and has increased discomfort with LE strengthening.    -SP       PT Plan    PT Plan Comments  Continue to progress as tolerable  -SP  --      User Key  (r) = Recorded By, (t) = Taken By, (c) = Cosigned By    Initials Name Provider Type    Priscila Wiggins, PT Physical Therapist          Modalities     Row Name 10/29/19 1435             Moist Heat    MH Applied  Yes  -SP      Location  L/S area and left knee  -SP      Rx Minutes  12 mins  -SP      MH Prior to Rx  Yes  -SP         ELECTRICAL  STIMULATION    Attended/Unattended  Unattended  -SP      Stimulation Type  IFC  -SP      Location/Electrode Placement/Other  left knee  -SP         Traction 47937    Traction Type  Lumbar  -SP      Rx Minutes  12 min  -SP      Position  Hook-lying 90/90  -SP      Weight  60  -SP      Hold  50  -SP      Relax  15  -SP        User Key  (r) = Recorded By, (t) = Taken By, (c) = Cosigned By    Initials Name Provider Type    Priscila Wiggins, PT Physical Therapist        OP Exercises     Row Name 10/29/19 1778             Subjective Comments    Subjective Comments  Patient reports that she continues to have pain with any prolonged walking or with riding in car.  She reports that she is feeling better today.  -SP         Exercise 1    Exercise Name 1  SKTC (with towel for assist)  -SP      Cueing 1  Verbal;Tactile  -SP      Reps 1  3  -SP      Time 1  20 sec  -SP         Exercise 2    Exercise Name 2  piriformis stretch  -SP      Cueing 2  Verbal;Demo  -SP      Reps 2  3  -SP      Time 2  20 sec  -SP         Exercise 3    Exercise Name 3  PPT  -SP      Cueing 3  Verbal;Demo  -SP      Reps 3  15  -SP      Time 3  5sec  -SP         Exercise 4    Exercise Name 4  PPT with ball squeeze  -SP      Cueing 4  Verbal;Tactile  -SP      Reps 4  15  -SP      Time 4  5sec  -SP         Exercise 5    Exercise Name 5  Hamstring stretch  -SP      Cueing 5  Verbal;Tactile  -SP      Reps 5  3  -SP      Time 5  20 sec  -SP         Exercise 6    Exercise Name 6  QS with ball squeeze  -SP      Cueing 6  Verbal;Tactile  -SP      Reps 6  10   -SP      Time 6  5 sec  -SP         Exercise 7    Exercise Name 7  SLR  -SP      Cueing 7  Verbal;Tactile  -SP      Sets 7  2  -SP      Reps 7  10  -SP         Exercise 8    Exercise Name 8  SAQ with ball squeeze  -SP      Cueing 8  Verbal;Tactile  -SP      Reps 8  20  -SP      Time 8  3 sec  -SP         Exercise 9    Exercise Name 9  sit to stand from elevated surface (assisted squat)  -SP       Cueing 9  Verbal;Demo  -SP      Reps 9  10   -SP        User Key  (r) = Recorded By, (t) = Taken By, (c) = Cosigned By    Initials Name Provider Type    Priscila Wiggins, VILMA Physical Therapist                      Manual Rx (last 36 hours)      Manual Treatments     Row Name 10/29/19 1435             Manual Rx 1    Manual Rx 1 Location  right innominate  -SP      Manual Rx 1 Type  MET: pelvic clearing and right anterior innominate correct  -SP        User Key  (r) = Recorded By, (t) = Taken By, (c) = Cosigned By    Initials Name Provider Type    Priscila Wiggins, VILMA Physical Therapist              Therapy Education  Given: HEP  Program: Reinforced  How Provided: Verbal  Provided to: Patient  Level of Understanding: Verbalized, Demonstrated              Time Calculation:   Start Time: 1435  Stop Time: 1559  Time Calculation (min): 84 min  Therapy Charges for Today     Code Description Service Date Service Provider Modifiers Qty    86229672598 HC PT ELECTRICAL STIM UNATTENDED 10/29/2019 Priscila Gonzalez, PT  1    20713511185 HC PT TRACTION LUMBAR 10/29/2019 Priscila Gonzalez, PT GP 1    56516654157 HC PT THER PROC EA 15 MIN 10/29/2019 Priscila Gonzalez, PT GP 1                    Priscila Gonzalez, PT  10/29/2019

## 2019-10-31 ENCOUNTER — HOSPITAL ENCOUNTER (OUTPATIENT)
Dept: PHYSICAL THERAPY | Facility: HOSPITAL | Age: 64
Setting detail: THERAPIES SERIES
Discharge: HOME | End: 2019-10-31

## 2019-10-31 DIAGNOSIS — M54.10 RADICULOPATHY WITH LOWER EXTREMITY SYMPTOMS: ICD-10-CM

## 2019-10-31 DIAGNOSIS — M54.30 SCIATICA, UNSPECIFIED LATERALITY: ICD-10-CM

## 2019-10-31 DIAGNOSIS — M51.36 DDD (DEGENERATIVE DISC DISEASE), LUMBAR: ICD-10-CM

## 2019-10-31 DIAGNOSIS — M54.41 RIGHT-SIDED LOW BACK PAIN WITH RIGHT-SIDED SCIATICA, UNSPECIFIED CHRONICITY: ICD-10-CM

## 2019-10-31 DIAGNOSIS — M25.462 PAIN AND SWELLING OF LEFT KNEE: Primary | ICD-10-CM

## 2019-10-31 DIAGNOSIS — M25.562 PAIN AND SWELLING OF LEFT KNEE: Primary | ICD-10-CM

## 2019-10-31 PROCEDURE — 97110 THERAPEUTIC EXERCISES: CPT | Performed by: PHYSICAL THERAPIST

## 2019-10-31 PROCEDURE — 97012 MECHANICAL TRACTION THERAPY: CPT | Performed by: PHYSICAL THERAPIST

## 2019-10-31 PROCEDURE — G0283 ELEC STIM OTHER THAN WOUND: HCPCS | Performed by: PHYSICAL THERAPIST

## 2019-11-04 ENCOUNTER — HOSPITAL ENCOUNTER (OUTPATIENT)
Dept: PHYSICAL THERAPY | Facility: HOSPITAL | Age: 64
Setting detail: THERAPIES SERIES
Discharge: HOME | End: 2019-11-04

## 2019-11-04 DIAGNOSIS — M51.36 DDD (DEGENERATIVE DISC DISEASE), LUMBAR: ICD-10-CM

## 2019-11-04 DIAGNOSIS — M54.10 RADICULOPATHY WITH LOWER EXTREMITY SYMPTOMS: ICD-10-CM

## 2019-11-04 DIAGNOSIS — M25.462 PAIN AND SWELLING OF LEFT KNEE: Primary | ICD-10-CM

## 2019-11-04 DIAGNOSIS — M54.41 RIGHT-SIDED LOW BACK PAIN WITH RIGHT-SIDED SCIATICA, UNSPECIFIED CHRONICITY: ICD-10-CM

## 2019-11-04 DIAGNOSIS — M54.30 SCIATICA, UNSPECIFIED LATERALITY: ICD-10-CM

## 2019-11-04 DIAGNOSIS — M25.562 PAIN AND SWELLING OF LEFT KNEE: Primary | ICD-10-CM

## 2019-11-04 PROCEDURE — G0283 ELEC STIM OTHER THAN WOUND: HCPCS | Performed by: PHYSICAL THERAPIST

## 2019-11-04 PROCEDURE — 97110 THERAPEUTIC EXERCISES: CPT | Performed by: PHYSICAL THERAPIST

## 2019-11-04 PROCEDURE — 97012 MECHANICAL TRACTION THERAPY: CPT | Performed by: PHYSICAL THERAPIST

## 2019-11-04 NOTE — THERAPY RE-EVALUATION
"    Outpatient Physical Therapy Ortho Re-Evaluation  [facility]     Patient Name: Celina Barry  : 1955  MRN: [id number]  Today's Date: 2019      Visit Date: 2019    Patient Active Problem List   Diagnosis   • Urinary frequency   • Acute midline low back pain without sciatica        Past Medical History:   Diagnosis Date   • Allergic    • Osteoarthritis    • Sciatica         Past Surgical History:   Procedure Laterality Date   • SHOULDER SURGERY Right    • SKIN CANCER EXCISION         Visit Dx:     ICD-10-CM ICD-9-CM   1. Pain and swelling of left knee M25.562 719.46    M25.462 719.06   2. Radiculopathy with lower extremity symptoms M54.10 724.4   3. Sciatica, unspecified laterality M54.30 724.3   4. Right-sided low back pain with right-sided sciatica, unspecified chronicity M54.41 724.3   5. DDD (degenerative disc disease), lumbar M51.36 722.52             PT Ortho     Row Name 19 1630       Subjective Comments    Subjective Comments  Patient reports that she has been having cramping type pain into left LE medial knee and calf area.  She reports that she thinks she has aggravated her back by how she is cleaning out her cat's litter box.  Patient's symptoms have fluctuated since initiation of therapy and seem to be related to activity.  With increaesd weight bearing or prolonged riding in car, she has increased low back and left knee pain.  Patient has not complained of right LE symptoms but does have occasional pain into (B) hips.  Patient does have some decrease in symptoms following traction and describes less episodes of  \"weak feeling\" in low back area.   -SP       Posture/Observations    Forward Head  Mild  -SP    Cervical Lordosis  Decreased  -SP    Thoracic Kyphosis  Mild  -SP    Lumbar lordosis  Increased  -SP    Iliac crests  Normal  -SP    Genu valgus  Bilateral:;Moderate  genu recuvatum  -SP    Foot pronation  Bilateral:;Mild  -SP    Observations  Edema  -SP    Posture/Observations " Comments  (B) genu valgus and recurvatum  -SP       Neural Tension Signs- Lower Quarter Clearing    SLR  Bilateral:;Negative  -SP       Sensory Screen for Light Touch- Lower Quarter Clearing    L1 (inguinal area)  Bilateral:;Intact  -SP    L2 (anterior mid thigh)  Bilateral:;Intact  -SP    L3 (distal anterior thigh)  Bilateral:;Intact  -SP    L4 (medial lower leg/foot)  Bilateral:;Intact  -SP    L5 (lateral lower leg/great toe)  Bilateral:;Intact  -SP    S1 (bottom of foot)  Bilateral:;Intact  -SP       Myotomal Screen- Lower Quarter Clearing    Hip flexion (L2)  4 (Good);Bilateral:  -SP    Knee extension (L3)  Bilateral:;4- (Good -)  -SP    Ankle DF (L4)  Bilateral:;4 (Good)  -SP    Great toe extension (L5)  Bilateral:;4 (Good)  -SP    Ankle PF (S1)  Bilateral:;4- (Good -) patient unable to heel raise bilaterally  -SP    Knee flexion (S2)  Bilateral:;4 (Good)  -SP       Lumbar/SI Special Tests    SLR (Neural Tension)  Bilateral:;Negative  -SP       Lumbosacral Palpation    SI  Right:;Tender  -SP    Erector Spinae (Paraspinals)  Bilateral:;Tender;Guarded/taut  -SP       Knee Palpation    Patella  Left:;Tender  -SP    Patella Tendon  Left:;Tender  -SP    Medial Joint Line  Left:;Tender;Swollen  -SP    Lateral Joint Line  Left:;Tender;Swollen  -SP    Posterior Joint Line  Left:;Tender;Swollen  -SP       Head/Neck/Trunk    Trunk Extension AROM  decreased by 25% with tightness in hamstring area  -SP    Trunk Flexion AROM  decreased by 50% from full range  -SP    Trunk Lt Lateral Flexion AROM  decreased by 25% from full range  -SP    Trunk Rt Lateral Flexion AROM  decreased by 25% from full range  -SP    Trunk Lt Rotation AROM  decreased by 25% from full range  -SP    Trunk Rt Rotation AROM  decreased by 25% from full range  -SP       Left Lower Ext    Lt Knee Extension/Flexion AROM  0 to 118 degrees  -SP       MMT Neck/Trunk    Trunk Flexion MMT, Gross Movement  (3+/5) fair plus  -SP    Left Pelvic Elevation MMT, Gross  Movement  (3-/5) fair minus  -SP    Right Pelvic Elevation MMT, Gross Movement:  (3-/5) fair minus  -SP       MMT Left Lower Ext    Lt Hip Flexion MMT, Gross Movement  (4/5) good  -SP    Lt Hip ABduction MMT, Gross Movement  (4-/5) good minus  -SP    Lt Hip ADduction MMT, Gross Movement  (4-/5) good minus  -SP    Lt Knee Extension MMT, Gross Movement  (4-/5) good minus  -SP    Lt Knee Flexion MMT, Gross Movement  (4/5) good  -SP    Lt Ankle Plantarflexion MMT, Gross Movement  (4-/5) good minus  -SP    Lt Ankle Dorsiflexion MMT, Gross Movement  (4/5) good  -SP       Sensation    Sensation WNL?  WFL  -SP       Flexibility    Flexibility Tested?  Lower Extremity  -SP       Lower Extremity Flexibility    Hamstrings  Bilateral:;Mildly limited  -SP    Hip Flexors  Bilateral:;Mildly limited  -SP    Quadriceps  Bilateral:;Mildly limited  -SP    Hip External Rotators  Bilateral:;Mildly limited  -SP    Hip Internal Rotators  Bilateral:;Mildly limited  -SP    Gastrocnemius  Bilateral:;Mildly limited  -SP       Transfers    Sit-Stand [place] (Transfers)  independent  -SP    Stand-Sit [place] (Transfers)  independent  -SP       Gait/Stairs Assessment/Training    [place] Level (Gait)  independent  -SP    Deviations/Abnormal Patterns (Gait)  chloé decreased;stride length decreased  -SP    [place] Level (Stairs)  independent  -SP      User Key  (r) = Recorded By, (t) = Taken By, (c) = Cosigned By    Initials Name Provider Type    Priscila Wiggins, PT Physical Therapist                      Therapy Education  Given: HEP  Program: Reinforced  How Provided: Verbal  Provided to: Patient  Level of Understanding: Demonstrated, Verbalized     PT OP Goals     Row Name 11/04/19 1630          PT Short Term Goals    STG Date to Achieve  10/04/19  -SP     STG 1  Patient reports no episodes of left knee buckling x 1 week  -SP     STG 1 Progress  Progressing;Ongoing  -SP     STG 2  Patient demonstrates left knee  AROM 0 to 120 degrees without complaints of pain at end ranges  -SP     STG 2 Progress  Ongoing  -SP     STG 3  Patient observed to transfer sit to stand with decreased use of UEs to assist with transfer  -SP     STG 3 Progress  Progressing;Ongoing  -SP        Long Term Goals    LTG Date to Achieve  10/19/19  -SP     LTG 1  Patient demonstrates left LE strength increased by one muscle grade  -SP     LTG 1 Progress  Partially Met;Ongoing  -SP     LTG 2  Patient ambulates level and unlevel surfaces with normal reciprocal gait pattern and no episodes of instability  -SP     LTG 2 Progress  Partially Met;Ongoing  -SP     LTG 3  Patient independent with HEP  -SP     LTG 3 Progress  Partially Met;Ongoing  -SP       User Key  (r) = Recorded By, (t) = Taken By, (c) = Cosigned By    Initials Name Provider Type    Priscila Wiggins, PT Physical Therapist          PT Assessment/Plan     Row Name 11/04/19 1826 11/04/19 1822       PT Assessment    Assessment Comments  --  Patient with fluctuation in symptoms but she is noting some improvement with pelvic traction.  Patient has made progress toward goals.  Patient exhibits improving trunk and LE strength gains and decreased low back pain reported following pelvic traction.  Patient continues to have difficulty with prolonged weight bearing activity and with lifting activity.    -SP       PT Plan    PT Frequency  1x/week;2x/week  -SP  2x/week  -SP    Predicted Duration of Therapy Intervention (Therapy Eval)  --  3-4 weeks  -SP    PT Plan Comments  Continue 2x/week decreasing to weekly then HEP over next 3-4 weeks  -SP  --      User Key  (r) = Recorded By, (t) = Taken By, (c) = Cosigned By    Initials Name Provider Type    Priscila Wiggins, PT Physical Therapist          Modalities     Row Name 11/04/19 1630             Moist Heat    MH Applied  Yes  -SP      Location  L/S area and left knee  -SP      Rx Minutes  12 mins  -SP      MH Prior to Rx  Yes  -SP       "   ELECTRICAL STIMULATION    Attended/Unattended  Unattended  -SP      Stimulation Type  IFC  -SP      Location/Electrode Placement/Other  left knee  -SP         Traction 30201    Traction Type  Lumbar  -SP      Rx Minutes  12 min  -SP      Position  Hook-lying 90/90  -SP      Weight  60  -SP      Hold  50  -SP      Relax  15  -SP        User Key  (r) = Recorded By, (t) = Taken By, (c) = Cosigned By    Initials Name Provider Type    Priscila Wiggins, PT Physical Therapist        OP Exercises     Row Name 11/04/19 9408             Subjective Comments    Subjective Comments  Patient reports that she has been having cramping type pain into left LE medial knee and calf area.  She reports that she thinks she has aggravated her back by how she is cleaning out her cat's litter box.  Patient's symptoms have fluctuated since initiation of therapy and seem to be related to activity.  With increaesd weight bearing or prolonged riding in car, she has increased low back and left knee pain.  Patient has not complained of right LE symptoms but does have occasional pain into (B) hips.  Patient does have some decrease in symptoms following traction and describes less episodes of  \"weak feeling\" in low back area.   -SP         Exercise 1    Exercise Name 1  SKTC (with towel for assist)  -SP      Cueing 1  Verbal;Tactile  -SP      Reps 1  3  -SP      Time 1  20 sec  -SP         Exercise 2    Exercise Name 2  piriformis stretch  -SP      Cueing 2  Verbal;Demo  -SP      Reps 2  3  -SP      Time 2  20 sec  -SP         Exercise 3    Exercise Name 3  PPT  -SP      Cueing 3  Verbal;Demo  -SP      Reps 3  15  -SP      Time 3  5sec  -SP         Exercise 4    Exercise Name 4  PPT with ball squeeze  -SP      Cueing 4  Verbal;Tactile  -SP      Reps 4  15  -SP      Time 4  5sec  -SP         Exercise 5    Exercise Name 5  Hamstring stretch  -SP      Cueing 5  Verbal;Tactile  -SP      Reps 5  3  -SP      Time 5  20 sec  -SP         " Exercise 6    Exercise Name 6  QS with ball squeeze  -SP      Cueing 6  Verbal;Tactile  -SP      Reps 6  10   -SP      Time 6  5 sec  -SP         Exercise 7    Exercise Name 7  SLR  -SP      Cueing 7  Verbal;Tactile  -SP      Sets 7  2  -SP      Reps 7  10  -SP         Exercise 8    Exercise Name 8  SAQ with ball squeeze  -SP      Cueing 8  Verbal;Tactile  -SP      Reps 8  20  -SP      Time 8  3 sec  -SP         Exercise 9    Exercise Name 9  sit to stand from elevated surface (assisted squat)  -SP      Cueing 9  Verbal;Demo  -SP      Reps 9  10   -SP        User Key  (r) = Recorded By, (t) = Taken By, (c) = Cosigned By    Initials Name Provider Type    Priscila Wiggins, VILMA Physical Therapist           Manual Rx (last 36 hours)      Manual Treatments     Row Name 11/04/19 1630             Manual Rx 1    Manual Rx 1 Location  right innominate  -SP      Manual Rx 1 Type  MET: pelvic clearing and right anterior innominate correct  -SP        User Key  (r) = Recorded By, (t) = Taken By, (c) = Cosigned By    Initials Name Provider Type    Priscila Wiggins, VILMA Physical Therapist                                Time Calculation:     Start Time: 1630  Stop Time: 1755  Time Calculation (min): 85 min     Therapy Charges for Today     Code Description Service Date Service Provider Modifiers Qty    19125995088 HC PT ELECTRICAL STIM UNATTENDED 11/4/2019 Priscila Gonzalze, PT  1    74456967552 HC PT TRACTION LUMBAR 11/4/2019 Priscila Gonzalez, PT GP 1    59900470430 HC PT THER PROC EA 15 MIN 11/4/2019 Priscila Gonzalez, PT GP 1                    Priscila Gonzalez, PT  11/4/2019

## 2019-11-06 ENCOUNTER — HOSPITAL ENCOUNTER (OUTPATIENT)
Dept: PHYSICAL THERAPY | Facility: HOSPITAL | Age: 64
Setting detail: THERAPIES SERIES
Discharge: HOME | End: 2019-11-06

## 2019-11-06 PROCEDURE — G0283 ELEC STIM OTHER THAN WOUND: HCPCS | Performed by: PHYSICAL THERAPIST

## 2019-11-06 PROCEDURE — 97012 MECHANICAL TRACTION THERAPY: CPT | Performed by: PHYSICAL THERAPIST

## 2019-11-06 PROCEDURE — 97110 THERAPEUTIC EXERCISES: CPT | Performed by: PHYSICAL THERAPIST

## 2019-11-07 NOTE — THERAPY TREATMENT NOTE
"    Outpatient Physical Therapy Ortho Treatment Note  [facility]     Patient Name: Celina Barry  : 1955  MRN: [id number]  Today's Date: 2019      Visit Date: 2019    Visit Dx:  No diagnosis found.    Patient Active Problem List   Diagnosis   • Urinary frequency   • Acute midline low back pain without sciatica        Past Medical History:   Diagnosis Date   • Allergic    • Osteoarthritis    • Sciatica         Past Surgical History:   Procedure Laterality Date   • SHOULDER SURGERY Right    • SKIN CANCER EXCISION         PT Ortho     Row Name 19 1900       Subjective Comments    Subjective Comments  Patient states she almost did not come today because she is hurting so bad.  Patient states that her back is hurting and feel weak.  She describes multiple areas of pain and \"weird\" sensations in lower and upper aspects of her legs.  She cannot relates any change in activity that could have increased her symptoms.   -SP    Row Name 19 1630       Subjective Comments    Subjective Comments  Patient reports that she has been having cramping type pain into left LE medial knee and calf area.  She reports that she thinks she has aggravated her back by how she is cleaning out her cat's litter box.  Patient's symptoms have fluctuated since initiation of therapy and seem to be related to activity.  With increaesd weight bearing or prolonged riding in car, she has increased low back and left knee pain.  Patient has not complained of right LE symptoms but does have occasional pain into (B) hips.  Patient does have some decrease in symptoms following traction and describes less episodes of  \"weak feeling\" in low back area.   -SP       Posture/Observations    Forward Head  Mild  -SP    Cervical Lordosis  Decreased  -SP    Thoracic Kyphosis  Mild  -SP    Lumbar lordosis  Increased  -SP    Iliac crests  Normal  -SP    Genu valgus  Bilateral:;Moderate  genu recuvatum  -SP    Foot pronation  Bilateral:;Mild  -SP "    Observations  Edema  -SP    Posture/Observations Comments  (B) genu valgus and recurvatum  -SP       Neural Tension Signs- Lower Quarter Clearing    SLR  Bilateral:;Negative  -SP       Sensory Screen for Light Touch- Lower Quarter Clearing    L1 (inguinal area)  Bilateral:;Intact  -SP    L2 (anterior mid thigh)  Bilateral:;Intact  -SP    L3 (distal anterior thigh)  Bilateral:;Intact  -SP    L4 (medial lower leg/foot)  Bilateral:;Intact  -SP    L5 (lateral lower leg/great toe)  Bilateral:;Intact  -SP    S1 (bottom of foot)  Bilateral:;Intact  -SP       Myotomal Screen- Lower Quarter Clearing    Hip flexion (L2)  4 (Good);Bilateral:  -SP    Knee extension (L3)  Bilateral:;4- (Good -)  -SP    Ankle DF (L4)  Bilateral:;4 (Good)  -SP    Great toe extension (L5)  Bilateral:;4 (Good)  -SP    Ankle PF (S1)  Bilateral:;4- (Good -) patient unable to heel raise bilaterally  -SP    Knee flexion (S2)  Bilateral:;4 (Good)  -SP       Lumbar/SI Special Tests    SLR (Neural Tension)  Bilateral:;Negative  -SP       Lumbosacral Palpation    SI  Right:;Tender  -SP    Erector Spinae (Paraspinals)  Bilateral:;Tender;Guarded/taut  -SP       Knee Palpation    Patella  Left:;Tender  -SP    Patella Tendon  Left:;Tender  -SP    Medial Joint Line  Left:;Tender;Swollen  -SP    Lateral Joint Line  Left:;Tender;Swollen  -SP    Posterior Joint Line  Left:;Tender;Swollen  -SP       Head/Neck/Trunk    Trunk Extension AROM  decreased by 25% with tightness in hamstring area  -SP    Trunk Flexion AROM  decreased by 50% from full range  -SP    Trunk Lt Lateral Flexion AROM  decreased by 25% from full range  -SP    Trunk Rt Lateral Flexion AROM  decreased by 25% from full range  -SP    Trunk Lt Rotation AROM  decreased by 25% from full range  -SP    Trunk Rt Rotation AROM  decreased by 25% from full range  -SP       Left Lower Ext    Lt Knee Extension/Flexion AROM  0 to 118 degrees  -SP       MMT Neck/Trunk    Trunk Flexion MMT, Gross Movement  (3+/5)  fair plus  -SP    Left Pelvic Elevation MMT, Gross Movement  (3-/5) fair minus  -SP    Right Pelvic Elevation MMT, Gross Movement:  (3-/5) fair minus  -SP       MMT Left Lower Ext    Lt Hip Flexion MMT, Gross Movement  (4/5) good  -SP    Lt Hip ABduction MMT, Gross Movement  (4-/5) good minus  -SP    Lt Hip ADduction MMT, Gross Movement  (4-/5) good minus  -SP    Lt Knee Extension MMT, Gross Movement  (4-/5) good minus  -SP    Lt Knee Flexion MMT, Gross Movement  (4/5) good  -SP    Lt Ankle Plantarflexion MMT, Gross Movement  (4-/5) good minus  -SP    Lt Ankle Dorsiflexion MMT, Gross Movement  (4/5) good  -SP       Sensation    Sensation WNL?  WFL  -SP       Flexibility    Flexibility Tested?  Lower Extremity  -SP       Lower Extremity Flexibility    Hamstrings  Bilateral:;Mildly limited  -SP    Hip Flexors  Bilateral:;Mildly limited  -SP    Quadriceps  Bilateral:;Mildly limited  -SP    Hip External Rotators  Bilateral:;Mildly limited  -SP    Hip Internal Rotators  Bilateral:;Mildly limited  -SP    Gastrocnemius  Bilateral:;Mildly limited  -SP       Transfers    Sit-Stand [place] (Transfers)  independent  -SP    Stand-Sit [place] (Transfers)  independent  -SP       Gait/Stairs Assessment/Training    [place] Level (Gait)  independent  -SP    Deviations/Abnormal Patterns (Gait)  chloé decreased;stride length decreased  -SP    [place] Level (Stairs)  independent  -SP      User Key  (r) = Recorded By, (t) = Taken By, (c) = Cosigned By    Initials Name Provider Type    Priscila Wiggins, PT Physical Therapist                      PT Assessment/Plan     Row Name 11/06/19 2677          PT Assessment    Assessment Comments  Patient with increase pain and multiple areas of pain.  Patient continues to present with fluctuation symptoms and no consistent improvement in symptoms  -SP        PT Plan    PT Plan Comments  Continue 1- 2 weeks, then patient may need referral as symptoms persist  -SP   "     User Key  (r) = Recorded By, (t) = Taken By, (c) = Cosigned By    Initials Name Provider Type    Priscila Wiggins, PT Physical Therapist          Modalities     Row Name 11/06/19 1900             Moist Heat    MH Applied  Yes  -SP      Location  L/S area and left knee  -SP      Rx Minutes  12 mins  -SP      MH Prior to Rx  Yes  -SP         ELECTRICAL STIMULATION    Attended/Unattended  Unattended  -SP      Stimulation Type  IFC  -SP      Location/Electrode Placement/Other  L/S area  -SP         Traction 47145    Traction Type  Lumbar  -SP      Rx Minutes  12 min  -SP      Position  Hook-lying 90/90  -SP      Weight  60  -SP      Hold  50  -SP      Relax  15  -SP        User Key  (r) = Recorded By, (t) = Taken By, (c) = Cosigned By    Initials Name Provider Type    Priscila Wiggins, PT Physical Therapist        OP Exercises     Row Name 11/06/19 1900             Subjective Comments    Subjective Comments  Patient states she almost did not come today because she is hurting so bad.  Patient states that her back is hurting and feel weak.  She describes multiple areas of pain and \"weird\" sensations in lower and upper aspects of her legs.  She cannot relates any change in activity that could have increased her symptoms.   -SP         Exercise 1    Exercise Name 1  SKTC (with towel for assist)  -SP      Cueing 1  Verbal;Tactile  -SP      Reps 1  3  -SP      Time 1  20 sec  -SP         Exercise 2    Exercise Name 2  piriformis stretch  -SP      Cueing 2  Verbal;Demo  -SP      Reps 2  3  -SP      Time 2  20 sec  -SP         Exercise 5    Exercise Name 5  Hamstring stretch  -SP      Cueing 5  Verbal;Tactile  -SP      Reps 5  3  -SP      Time 5  20 sec  -SP        User Key  (r) = Recorded By, (t) = Taken By, (c) = Cosigned By    Initials Name Provider Type    Priscila Wiggins, PT Physical Therapist                      Manual Rx (last 36 hours)      Manual Treatments     Row Name 11/06/19 " 1900             Manual Rx 1    Manual Rx 1 Location  right innominate  -SP      Manual Rx 1 Type  MET: pelvic clearing and right anterior innominate correct  -SP        User Key  (r) = Recorded By, (t) = Taken By, (c) = Cosigned By    Initials Name Provider Type    Priscila Wiggins, PT Physical Therapist              Therapy Education  Given: HEP  Program: Reinforced  How Provided: Verbal  Provided to: Patient  Level of Understanding: Verbalized, Demonstrated              Time Calculation:   Start Time: 1640  Stop Time: 1800  Time Calculation (min): 80 min  Therapy Charges for Today     Code Description Service Date Service Provider Modifiers Qty    29212085156 HC PT TRACTION LUMBAR 11/6/2019 Priscila Gonzalez, PT GP 1    19937576785 HC PT ELECTRICAL STIM UNATTENDED 11/6/2019 Priscila Gonzalez, PT  1    [id number] HC PT THER PROC EA 15 MIN 11/6/2019 Priscila Gonzalez, PT GP 1                    Priscila Gonzalez, PT  11/6/2019

## 2019-11-12 ENCOUNTER — HOSPITAL ENCOUNTER (OUTPATIENT)
Dept: PHYSICAL THERAPY | Facility: HOSPITAL | Age: 64
Setting detail: THERAPIES SERIES
Discharge: HOME | End: 2019-11-12

## 2019-11-12 DIAGNOSIS — M51.36 DDD (DEGENERATIVE DISC DISEASE), LUMBAR: ICD-10-CM

## 2019-11-12 DIAGNOSIS — M54.41 RIGHT-SIDED LOW BACK PAIN WITH RIGHT-SIDED SCIATICA, UNSPECIFIED CHRONICITY: ICD-10-CM

## 2019-11-12 DIAGNOSIS — M25.562 PAIN AND SWELLING OF LEFT KNEE: Primary | ICD-10-CM

## 2019-11-12 DIAGNOSIS — M54.10 RADICULOPATHY WITH LOWER EXTREMITY SYMPTOMS: ICD-10-CM

## 2019-11-12 DIAGNOSIS — M25.462 PAIN AND SWELLING OF LEFT KNEE: Primary | ICD-10-CM

## 2019-11-12 DIAGNOSIS — M54.30 SCIATICA, UNSPECIFIED LATERALITY: ICD-10-CM

## 2019-11-12 PROCEDURE — G0283 ELEC STIM OTHER THAN WOUND: HCPCS | Performed by: PHYSICAL THERAPIST

## 2019-11-12 PROCEDURE — 97012 MECHANICAL TRACTION THERAPY: CPT | Performed by: PHYSICAL THERAPIST

## 2019-11-12 PROCEDURE — 97110 THERAPEUTIC EXERCISES: CPT | Performed by: PHYSICAL THERAPIST

## 2019-11-12 NOTE — THERAPY TREATMENT NOTE
"    Outpatient Physical Therapy Ortho Treatment Note  [facility]     Patient Name: Celina Barry  : 1955  MRN: [id number]  Today's Date: 2019      Visit Date: 2019    Visit Dx:    ICD-10-CM ICD-9-CM   1. Pain and swelling of left knee M25.562 719.46    M25.462 719.06   2. Radiculopathy with lower extremity symptoms M54.10 724.4   3. Sciatica, unspecified laterality M54.30 724.3   4. Right-sided low back pain with right-sided sciatica, unspecified chronicity M54.41 724.3   5. DDD (degenerative disc disease), lumbar M51.36 722.52       Patient Active Problem List   Diagnosis   • Urinary frequency   • Acute midline low back pain without sciatica        Past Medical History:   Diagnosis Date   • Allergic    • Osteoarthritis    • Sciatica         Past Surgical History:   Procedure Laterality Date   • SHOULDER SURGERY Right    • SKIN CANCER EXCISION         PT Ortho     Row Name 19 1340       Subjective Comments    Subjective Comments  Patient is tearful and frustrated.  She reports that she fell in a parking lot last week and feels that she is having trouble clearing her feet.   She states that she feels like she has difficulty walking at times.  Patient complains of multiple areas of pain in (B) LEs and legs.  She also reports that her arms are uncomfortable and she feels like she has lumps in her arms.  Patient is crying and feels like her MD does not listen to her and \"thinks I talk too much\"  Patient expresses concerns over her ability to work, financial issues and lack of help for her multiple health issues.    -SP      User Key  (r) = Recorded By, (t) = Taken By, (c) = Cosigned By    Initials Name Provider Type    Priscila Wiggins, PT Physical Therapist                      PT Assessment/Plan     Row Name 19 1522          PT Assessment    Assessment Comments  Patient with increased pain in multiple area of body.  She exhibits no consistent improvement in symptoms.   Patient " "continues to be very emotional.  -SP        PT Plan    PT Plan Comments  Continue 2-3 visits  -SP       User Key  (r) = Recorded By, (t) = Taken By, (c) = Cosigned By    Initials Name Provider Type    Priscila Wiggins, PT Physical Therapist          Modalities     Row Name 11/12/19 1340             Moist Heat    MH Applied  Yes  -SP      Location  L/S area and left knee  -SP      Rx Minutes  12 mins  -SP      MH Prior to Rx  Yes  -SP         ELECTRICAL STIMULATION    Attended/Unattended  Unattended  -SP      Stimulation Type  IFC  -SP      Location/Electrode Placement/Other  L/S area  -SP         Traction 49690    Traction Type  Lumbar  -SP      Rx Minutes  12 min  -SP      Position  Hook-lying 90/90  -SP      Weight  60  -SP      Hold  50  -SP      Relax  15  -SP        User Key  (r) = Recorded By, (t) = Taken By, (c) = Cosigned By    Initials Name Provider Type    Priscila Wiggins, PT Physical Therapist        OP Exercises     Row Name 11/12/19 1340             Subjective Comments    Subjective Comments  Patient is tearful and frustrated.  She reports that she fell in a parking lot last week and feels that she is having trouble clearing her feet.   She states that she feels like she has difficulty walking at times.  Patient complains of multiple areas of pain in (B) LEs and legs.  She also reports that her arms are uncomfortable and she feels like she has lumps in her arms.  Patient is crying and feels like her MD does not listen to her and \"thinks I talk too much\"  Patient expresses concerns over her ability to work, financial issues and lack of help for her multiple health issues.    -SP         Exercise 1    Exercise Name 1  SKTC (with towel for assist)  -SP      Cueing 1  Verbal;Tactile  -SP      Reps 1  3  -SP      Time 1  20 sec  -SP         Exercise 2    Exercise Name 2  piriformis stretch  -SP      Cueing 2  Verbal;Demo  -SP      Reps 2  3  -SP      Time 2  20 sec  -SP         " Exercise 5    Exercise Name 5  Hamstring stretch  -SP      Cueing 5  Verbal;Tactile  -SP      Reps 5  3  -SP      Time 5  20 sec  -SP         Exercise 7    Exercise Name 7  SLR  -SP      Cueing 7  Verbal;Tactile  -SP      Sets 7  2  -SP      Reps 7  10  -SP         Exercise 8    Exercise Name 8  SAQ with ball squeeze  -SP      Cueing 8  Verbal;Tactile  -SP      Reps 8  20  -SP      Time 8  3 sec  -SP        User Key  (r) = Recorded By, (t) = Taken By, (c) = Cosigned By    Initials Name Provider Type    Priscila Wiggins, VILMA Physical Therapist                      Manual Rx (last 36 hours)      Manual Treatments     Row Name 11/12/19 1340             Manual Rx 1    Manual Rx 1 Location  right innominate  -SP      Manual Rx 1 Type  MET: pelvic clearing and right anterior innominate correct  -SP        User Key  (r) = Recorded By, (t) = Taken By, (c) = Cosigned By    Initials Name Provider Type    Priscila Wiggins PT Physical Therapist              Therapy Education  Education Details: Patient asked if she would again like to speak with counselor/ at hospital, but reports she does not want to.    Given: HEP  Program: Reinforced  How Provided: Verbal  Provided to: Patient  Level of Understanding: Verbalized              Time Calculation:   Start Time: 1340  Stop Time: 1508  Time Calculation (min): 88 min  Therapy Charges for Today     Code Description Service Date Service Provider Modifiers Qty    82326598666 HC PT ELECTRICAL STIM UNATTENDED 11/12/2019 Priscila Gonzalez, PT  1    23215118389 HC PT TRACTION LUMBAR 11/12/2019 Priscila Gonzalez, PT GP 1    15673243102 HC PT THER PROC EA 15 MIN 11/12/2019 Priscila Gonzalez, PT GP 1                    Priscila Gonzalez, PT  11/12/2019

## 2019-11-14 ENCOUNTER — HOSPITAL ENCOUNTER (OUTPATIENT)
Dept: PHYSICAL THERAPY | Facility: HOSPITAL | Age: 64
Setting detail: THERAPIES SERIES
Discharge: HOME | End: 2019-11-14

## 2019-11-14 DIAGNOSIS — M25.562 PAIN AND SWELLING OF LEFT KNEE: Primary | ICD-10-CM

## 2019-11-14 DIAGNOSIS — M25.462 PAIN AND SWELLING OF LEFT KNEE: Primary | ICD-10-CM

## 2019-11-14 DIAGNOSIS — M54.10 RADICULOPATHY WITH LOWER EXTREMITY SYMPTOMS: ICD-10-CM

## 2019-11-14 DIAGNOSIS — M54.30 SCIATICA, UNSPECIFIED LATERALITY: ICD-10-CM

## 2019-11-14 DIAGNOSIS — M54.41 RIGHT-SIDED LOW BACK PAIN WITH RIGHT-SIDED SCIATICA, UNSPECIFIED CHRONICITY: ICD-10-CM

## 2019-11-14 DIAGNOSIS — M51.36 DDD (DEGENERATIVE DISC DISEASE), LUMBAR: ICD-10-CM

## 2019-11-14 PROCEDURE — G0283 ELEC STIM OTHER THAN WOUND: HCPCS | Performed by: PHYSICAL THERAPIST

## 2019-11-14 PROCEDURE — 97110 THERAPEUTIC EXERCISES: CPT | Performed by: PHYSICAL THERAPIST

## 2019-11-14 NOTE — THERAPY TREATMENT NOTE
"    Outpatient Physical Therapy Ortho Treatment Note  [facility]     Patient Name: Celina Barry  : 1955  MRN: [id number]  Today's Date: 2019      Visit Date: 2019    Visit Dx:    ICD-10-CM ICD-9-CM   1. Pain and swelling of left knee M25.562 719.46    M25.462 719.06   2. Radiculopathy with lower extremity symptoms M54.10 724.4   3. Sciatica, unspecified laterality M54.30 724.3   4. Right-sided low back pain with right-sided sciatica, unspecified chronicity M54.41 724.3   5. DDD (degenerative disc disease), lumbar M51.36 722.52       Patient Active Problem List   Diagnosis   • Urinary frequency   • Acute midline low back pain without sciatica        Past Medical History:   Diagnosis Date   • Allergic    • Osteoarthritis    • Sciatica         Past Surgical History:   Procedure Laterality Date   • SHOULDER SURGERY Right    • SKIN CANCER EXCISION         PT Ortho     Row Name 19 1340       Subjective Comments    Subjective Comments  Patient states that she continues to have increased low back pain and pain into various areas of lower legs.  She is having pain into left lateral leg and foot area.  She reports that her legs feel very tight and she feels \"lumps\" in her legs.  Patient reports that she is having cramps in her bilateral legs especially at night and her feet feel as if they are drawing up.  Patient is concerned that maybe recent dental work may be related to her increased body pain.  Patient states that she has also been having anterior cervical area pain and difficulty swallowing.  Patient is very anxious and stressed regarding her ability to work and complete ADLs.   She does not want to speak to hospital counselor today.  Patient is planning to see a different primary care provider, as she does not feel her provider is listening to her.  Patient is awaiting call for appointment.  -SP    Row Name 19 1340       Subjective Comments    Subjective Comments  Patient is tearful and " "frustrated.  She reports that she fell in a parking lot last week and feels that she is having trouble clearing her feet.   She states that she feels like she has difficulty walking at times.  Patient complains of multiple areas of pain in (B) LEs and legs.  She also reports that her arms are uncomfortable and she feels like she has lumps in her arms.  Patient is crying and feels like her MD does not listen to her and \"thinks I talk too much\"  Patient expresses concerns over her ability to work, financial issues and lack of help for her multiple health issues.    -SP      User Key  (r) = Recorded By, (t) = Taken By, (c) = Cosigned By    Initials Name Provider Type    Priscila Wiggins, PT Physical Therapist                      PT Assessment/Plan     Row Name 11/14/19 7730          PT Assessment    Assessment Comments  Patient continues to have elevated pain with various and changing symptoms throughout her body.  She is planning to follow up with a different healthcare provider to address her ongoing issues  -SP        PT Plan    PT Plan Comments  Continue 1-2 visits then D/C with HEP  -SP       User Key  (r) = Recorded By, (t) = Taken By, (c) = Cosigned By    Initials Name Provider Type    Priscila Wiggins, PT Physical Therapist          Modalities     Row Name 11/14/19 1340             Moist Heat    MH Applied  Yes  -SP      Location  L/S area and left knee  -SP      Rx Minutes  12 mins  -SP      MH Prior to Rx  Yes  -SP         ELECTRICAL STIMULATION    Attended/Unattended  Unattended  -SP      Stimulation Type  IFC  -SP      Location/Electrode Placement/Other  L/S area  -SP        User Key  (r) = Recorded By, (t) = Taken By, (c) = Cosigned By    Initials Name Provider Type    Priscila Wiggins, PT Physical Therapist        OP Exercises     Row Name 11/14/19 1340             Subjective Comments    Subjective Comments  Patient states that she continues to have increased low back " "pain and pain into various areas of lower legs.  She is having pain into left lateral leg and foot area.  She reports that her legs feel very tight and she feels \"lumps\" in her legs.  Patient reports that she is having cramps in her bilateral legs especially at night and her feet feel as if they are drawing up.  Patient is concerned that maybe recent dental work may be related to her increased body pain.  Patient states that she has also been having anterior cervical area pain and difficulty swallowing.  Patient is very anxious and stressed regarding her ability to work and complete ADLs.   She does not want to speak to hospital counselor today.  Patient is planning to see a different primary care provider, as she does not feel her provider is listening to her.  Patient is awaiting call for appointment.  -SP         Exercise 1    Exercise Name 1  SKTC (with towel for assist)  -SP      Cueing 1  Verbal;Tactile  -SP      Reps 1  3  -SP      Time 1  20 sec  -SP         Exercise 2    Exercise Name 2  piriformis stretch  -SP      Cueing 2  Verbal;Demo  -SP      Reps 2  3  -SP      Time 2  20 sec  -SP         Exercise 5    Exercise Name 5  Hamstring stretch  -SP      Cueing 5  Verbal;Tactile  -SP      Reps 5  3  -SP      Time 5  20 sec  -SP         Exercise 7    Exercise Name 7  SLR  -SP      Cueing 7  Verbal;Tactile  -SP      Sets 7  2  -SP      Reps 7  10  -SP         Exercise 8    Exercise Name 8  SAQ with ball squeeze  -SP      Cueing 8  Verbal;Tactile  -SP      Reps 8  20  -SP      Time 8  3 sec  -SP        User Key  (r) = Recorded By, (t) = Taken By, (c) = Cosigned By    Initials Name Provider Type    Priscila Wiggins, PT Physical Therapist                      Manual Rx (last 36 hours)      Manual Treatments     Row Name 11/14/19 1770             Manual Rx 1    Manual Rx 1 Location  right innominate  -SP      Manual Rx 1 Type  MET: pelvic clearing and right anterior innominate correct  -SP        User " Key  (r) = Recorded By, (t) = Taken By, (c) = Cosigned By    Initials Name Provider Type    SP Priscila Gonzalez, PT Physical Therapist              Therapy Education  Given: HEP  Program: Reinforced  How Provided: Verbal  Provided to: Patient  Level of Understanding: Demonstrated              Time Calculation:   Start Time: 1340  Stop Time: 1455  Time Calculation (min): 75 min  Therapy Charges for Today     Code Description Service Date Service Provider Modifiers Qty    30957402569 HC PT ELECTRICAL STIM UNATTENDED 11/14/2019 Priscila Gonzalez, PT  1    46062235548 HC PT THER PROC EA 15 MIN 11/14/2019 Priscila Gonzalez, PT GP 1                    Priscila Gonzalez, PT  11/14/2019

## 2019-11-20 ENCOUNTER — APPOINTMENT (OUTPATIENT)
Dept: PHYSICAL THERAPY | Facility: HOSPITAL | Age: 64
End: 2019-11-20

## 2019-11-25 ENCOUNTER — HOSPITAL ENCOUNTER (OUTPATIENT)
Dept: PHYSICAL THERAPY | Facility: HOSPITAL | Age: 64
Setting detail: THERAPIES SERIES
Discharge: HOME | End: 2019-11-25

## 2019-11-25 PROCEDURE — 97012 MECHANICAL TRACTION THERAPY: CPT | Performed by: PHYSICAL THERAPIST

## 2019-11-25 PROCEDURE — G0283 ELEC STIM OTHER THAN WOUND: HCPCS | Performed by: PHYSICAL THERAPIST

## 2019-11-25 PROCEDURE — 97110 THERAPEUTIC EXERCISES: CPT | Performed by: PHYSICAL THERAPIST

## 2019-11-25 NOTE — THERAPY TREATMENT NOTE
"    Outpatient Physical Therapy Ortho Treatment Note  [facility]     Patient Name: Celina Barry  : 1955  MRN: [id number]  Today's Date: 2019      Visit Date: 2019    Visit Dx:  No diagnosis found.    Patient Active Problem List   Diagnosis   • Urinary frequency   • Acute midline low back pain without sciatica        Past Medical History:   Diagnosis Date   • Allergic    • Osteoarthritis    • Sciatica         Past Surgical History:   Procedure Laterality Date   • SHOULDER SURGERY Right    • SKIN CANCER EXCISION         PT Ortho     Row Name 19 1500       Subjective Comments    Subjective Comments  Patient states that she is better overall, then states that her left knee continues to have intermittent sharp pain and she has difficulty getting up from chair and at times has to use a walker.  She reports that she continues to have difficulty with stairs.  Patient reports that her back doesn't feel \"as weak\" but is having consistent pain in (b) sacral area \"where I fell\"   Patient did buy some off the shelf orthotics that seem to help her.   -SP      User Key  (r) = Recorded By, (t) = Taken By, (c) = Cosigned By    Initials Name Provider Type    Priscila Wiggins, PT Physical Therapist                      PT Assessment/Plan     Row Name 19 4990          PT Assessment    Assessment Comments  Patient continues to have pain that fluctuates but remains presents.  She continues to report functional mobility limitations that have changed minimally since initiation of therapy  -SP        PT Plan    PT Plan Comments  Continue 1-2 visits  -SP       User Key  (r) = Recorded By, (t) = Taken By, (c) = Cosigned By    Initials Name Provider Type    Priscila Wiggins, PT Physical Therapist          Modalities     Row Name 19 1538             Moist Heat    MH Applied  Yes  -SP      Location  L/S area and left knee  -SP      Rx Minutes  12 mins  -SP      MH Prior to Rx  Yes  -SP   " "      ELECTRICAL STIMULATION    Attended/Unattended  Unattended  -SP      Stimulation Type  IFC  -SP      Location/Electrode Placement/Other  L/S area  -SP         Traction 66092    Traction Type  Lumbar  -SP      Rx Minutes  12 min  -SP      Position  Hook-lying 90/90  -SP      Weight  60  -SP      Hold  50  -SP      Relax  15  -SP        User Key  (r) = Recorded By, (t) = Taken By, (c) = Cosigned By    Initials Name Provider Type    Priscila Wiggins, PT Physical Therapist        OP Exercises     Row Name 11/25/19 1500             Subjective Comments    Subjective Comments  Patient states that she is better overall, then states that her left knee continues to have intermittent sharp pain and she has difficulty getting up from chair and at times has to use a walker.  She reports that she continues to have difficulty with stairs.  Patient reports that her back doesn't feel \"as weak\" but is having consistent pain in (b) sacral area \"where I fell\"   Patient did buy some off the shelf orthotics that seem to help her.   -SP         Exercise 1    Exercise Name 1  SKTC (with towel for assist)  -SP      Cueing 1  Verbal;Tactile  -SP      Reps 1  3  -SP      Time 1  20 sec  -SP         Exercise 2    Exercise Name 2  piriformis stretch  -SP      Cueing 2  Verbal;Demo  -SP      Reps 2  3  -SP      Time 2  20 sec  -SP         Exercise 5    Exercise Name 5  Hamstring stretch  -SP      Cueing 5  Verbal;Tactile  -SP      Reps 5  3  -SP      Time 5  20 sec  -SP         Exercise 7    Exercise Name 7  SLR  -SP      Cueing 7  Verbal;Tactile  -SP      Sets 7  2  -SP      Reps 7  10  -SP         Exercise 8    Exercise Name 8  SAQ with ball squeeze  -SP      Cueing 8  Verbal;Tactile  -SP      Reps 8  20  -SP      Time 8  3 sec  -SP        User Key  (r) = Recorded By, (t) = Taken By, (c) = Cosigned By    Initials Name Provider Type    Priscila Wiggins, PT Physical Therapist                      Manual Rx (last 36 " hours)      Manual Treatments     Row Name 11/25/19 1500             Manual Rx 1    Manual Rx 1 Location  right innominate  -SP      Manual Rx 1 Type  MET: pelvic clearing and right anterior innominate correct  -SP        User Key  (r) = Recorded By, (t) = Taken By, (c) = Cosigned By    Initials Name Provider Type    Priscila Wiggins, PT Physical Therapist              Therapy Education  Given: HEP  Program: New  How Provided: Verbal  Provided to: Patient  Level of Understanding: Verbalized              Time Calculation:   Start Time: 1538  Stop Time: 1650  Time Calculation (min): 72 min  Therapy Charges for Today     Code Description Service Date Service Provider Modifiers Qty    04651106831 HC PT ELECTRICAL STIM UNATTENDED 11/25/2019 Priscila Gonzalez, PT  1    28383876282 HC PT TRACTION LUMBAR 11/25/2019 Priscila Gonzalez, PT GP 1    [id number] HC PT THER PROC EA 15 MIN 11/25/2019 Priscila Gonzalez, PT GP 1                    Priscila Gonzalez, PT  11/25/2019

## 2019-12-03 ENCOUNTER — HOSPITAL ENCOUNTER (OUTPATIENT)
Dept: PHYSICAL THERAPY | Facility: HOSPITAL | Age: 64
Setting detail: THERAPIES SERIES
Discharge: HOME | End: 2019-12-03

## 2019-12-03 DIAGNOSIS — M25.462 PAIN AND SWELLING OF LEFT KNEE: Primary | ICD-10-CM

## 2019-12-03 DIAGNOSIS — M54.41 RIGHT-SIDED LOW BACK PAIN WITH RIGHT-SIDED SCIATICA, UNSPECIFIED CHRONICITY: ICD-10-CM

## 2019-12-03 DIAGNOSIS — M51.36 DDD (DEGENERATIVE DISC DISEASE), LUMBAR: ICD-10-CM

## 2019-12-03 DIAGNOSIS — M54.30 SCIATICA, UNSPECIFIED LATERALITY: ICD-10-CM

## 2019-12-03 DIAGNOSIS — M25.562 PAIN AND SWELLING OF LEFT KNEE: Primary | ICD-10-CM

## 2019-12-03 DIAGNOSIS — M54.10 RADICULOPATHY WITH LOWER EXTREMITY SYMPTOMS: ICD-10-CM

## 2019-12-03 NOTE — SIGNIFICANT NOTE
Patient presents to therapy for treatment but reports that she is not feeling well and has been sick over the last couple of weeks.  Patient wants to try treatment and receives MHP and IFC to L/S area.  After modalities, patient states she is feeling worse and agrees that she should discontinue treatment today.  Patient is rescheduled for another visit.

## 2019-12-09 ENCOUNTER — TRANSCRIBE ORDERS (OUTPATIENT)
Dept: ADMINISTRATIVE | Facility: HOSPITAL | Age: 64
End: 2019-12-09

## 2019-12-09 DIAGNOSIS — Z78.0 MENOPAUSE: Primary | ICD-10-CM

## 2019-12-09 DIAGNOSIS — Z12.31 VISIT FOR SCREENING MAMMOGRAM: Primary | ICD-10-CM

## 2019-12-10 ENCOUNTER — APPOINTMENT (OUTPATIENT)
Dept: PHYSICAL THERAPY | Facility: HOSPITAL | Age: 64
End: 2019-12-10

## 2019-12-17 ENCOUNTER — HOSPITAL ENCOUNTER (OUTPATIENT)
Dept: PHYSICAL THERAPY | Facility: HOSPITAL | Age: 64
Setting detail: THERAPIES SERIES
Discharge: HOME | End: 2019-12-17

## 2019-12-17 DIAGNOSIS — M54.10 RADICULOPATHY WITH LOWER EXTREMITY SYMPTOMS: ICD-10-CM

## 2019-12-17 DIAGNOSIS — M54.41 RIGHT-SIDED LOW BACK PAIN WITH RIGHT-SIDED SCIATICA, UNSPECIFIED CHRONICITY: ICD-10-CM

## 2019-12-17 DIAGNOSIS — M25.462 PAIN AND SWELLING OF LEFT KNEE: Primary | ICD-10-CM

## 2019-12-17 DIAGNOSIS — M51.36 DDD (DEGENERATIVE DISC DISEASE), LUMBAR: ICD-10-CM

## 2019-12-17 DIAGNOSIS — M25.562 PAIN AND SWELLING OF LEFT KNEE: Primary | ICD-10-CM

## 2019-12-17 DIAGNOSIS — M54.30 SCIATICA, UNSPECIFIED LATERALITY: ICD-10-CM

## 2019-12-17 PROCEDURE — 97110 THERAPEUTIC EXERCISES: CPT | Performed by: PHYSICAL THERAPIST

## 2019-12-17 PROCEDURE — G0283 ELEC STIM OTHER THAN WOUND: HCPCS | Performed by: PHYSICAL THERAPIST

## 2019-12-17 NOTE — THERAPY TREATMENT NOTE
"    Outpatient Physical Therapy Ortho Treatment Note  GERRY Perez     Patient Name: Celina Barry  : 1955  MRN: [id number]  Today's Date: 2019      Visit Date: 2019    Visit Dx:    ICD-10-CM ICD-9-CM   1. Pain and swelling of left knee M25.562 719.46    M25.462 719.06   2. Radiculopathy with lower extremity symptoms M54.10 724.4   3. Sciatica, unspecified laterality M54.30 724.3   4. Right-sided low back pain with right-sided sciatica, unspecified chronicity M54.41 724.3   5. DDD (degenerative disc disease), lumbar M51.36 722.52       Patient Active Problem List   Diagnosis   • Urinary frequency   • Acute midline low back pain without sciatica        Past Medical History:   Diagnosis Date   • Allergic    • Osteoarthritis    • Sciatica         Past Surgical History:   Procedure Laterality Date   • SHOULDER SURGERY Right    • SKIN CANCER EXCISION         PT Ortho     Row Name 19 1700       Subjective Comments    Subjective Comments  Patient reports she followed up with new primary care practioner.  She was given orders for aquatic therapy and plans to begin this on Friday.  Patient is crying and reporting that she does not feel that doctors listen to her and she has so many things wrong with her.  Patient states that she has had increased pain in left buttocks area over the last week when she transfers sit to stand and felt \"pop, pop, pop\"  up her left side and has had increased left buttocks area pain since.    -SP       Posture/Observations    Forward Head  Mild  -SP    Cervical Lordosis  Decreased  -SP    Thoracic Kyphosis  Mild  -SP    Lumbar lordosis  Increased  -SP    Iliac crests  Normal  -SP    Genu valgus  Bilateral:;Moderate  genu recuvatum  -SP    Foot pronation  Bilateral:;Mild  -SP    Observations  Edema  -SP    Posture/Observations Comments  (B) genu valgus and recurvatum  -SP       Neural Tension Signs- Lower Quarter Clearing    SLR  Bilateral:;Negative  -SP       Sensory Screen for " Light Touch- Lower Quarter Clearing    L1 (inguinal area)  Bilateral:;Intact  -SP    L2 (anterior mid thigh)  Bilateral:;Intact  -SP    L3 (distal anterior thigh)  Bilateral:;Intact  -SP    L4 (medial lower leg/foot)  Bilateral:;Intact  -SP    L5 (lateral lower leg/great toe)  Bilateral:;Intact  -SP    S1 (bottom of foot)  Bilateral:;Intact  -SP       Myotomal Screen- Lower Quarter Clearing    Hip flexion (L2)  4 (Good);Bilateral:  -SP    Knee extension (L3)  Bilateral:;4- (Good -)  -SP    Ankle DF (L4)  Bilateral:;4 (Good)  -SP    Great toe extension (L5)  Bilateral:;4 (Good)  -SP    Ankle PF (S1)  Bilateral:;4- (Good -) patient unable to heel raise bilaterally  -SP    Knee flexion (S2)  Bilateral:;4 (Good)  -SP       Lumbar/SI Special Tests    SLR (Neural Tension)  Bilateral:;Negative  -SP       Lumbosacral Palpation    SI  Right:;Tender  -SP    Erector Spinae (Paraspinals)  Bilateral:;Tender;Guarded/taut  -SP       Knee Palpation    Patella  Left:;Tender  -SP    Patella Tendon  Left:;Tender  -SP    Medial Joint Line  Left:;Tender;Swollen  -SP    Lateral Joint Line  Left:;Tender;Swollen  -SP    Posterior Joint Line  Left:;Tender;Swollen  -SP       MMT Neck/Trunk    Trunk Flexion MMT, Gross Movement  (3+/5) fair plus  -SP    Left Pelvic Elevation MMT, Gross Movement  (3-/5) fair minus  -SP    Right Pelvic Elevation MMT, Gross Movement:  (3-/5) fair minus  -SP       MMT Left Lower Ext    Lt Hip Flexion MMT, Gross Movement  (4/5) good  -SP    Lt Hip ABduction MMT, Gross Movement  (4-/5) good minus  -SP    Lt Hip ADduction MMT, Gross Movement  (4-/5) good minus  -SP    Lt Knee Extension MMT, Gross Movement  (4-/5) good minus  -SP    Lt Knee Flexion MMT, Gross Movement  (4/5) good  -SP    Lt Ankle Plantarflexion MMT, Gross Movement  (4-/5) good minus  -SP    Lt Ankle Dorsiflexion MMT, Gross Movement  (4/5) good  -SP       Sensation    Sensation WNL?  WFL  -SP       Flexibility    Flexibility Tested?  Lower Extremity   -SP       Lower Extremity Flexibility    Hamstrings  Bilateral:;Mildly limited  -SP    Hip Flexors  Bilateral:;Mildly limited  -SP    Quadriceps  Bilateral:;Mildly limited  -SP    Hip External Rotators  Bilateral:;Mildly limited  -SP    Hip Internal Rotators  Bilateral:;Mildly limited  -SP    Gastrocnemius  Bilateral:;Mildly limited  -SP       Transfers    Sit-Stand Early (Transfers)  independent  -SP    Stand-Sit Early (Transfers)  independent  -SP       Gait/Stairs Assessment/Training    Early Level (Gait)  independent  -SP    Deviations/Abnormal Patterns (Gait)  chloé decreased;stride length decreased  -SP    Early Level (Stairs)  independent  -SP      User Key  (r) = Recorded By, (t) = Taken By, (c) = Cosigned By    Initials Name Provider Type    Priscila Wiggins, PT Physical Therapist                      PT Assessment/Plan     Row Name 12/17/19 4374          PT Assessment    Assessment Comments  Patient has failed to make consistent improvement in symptoms since initiation of therapy.  She has had multiple body part areas of pain with variable presentation.  Patient has not made consistent progress toward goals and continues to be emotionally upset over ongoing symptoms.  She has been offered assist for emotional needs and referred back to her physician for ongoing and minimally changed pain.  Patient reports she is scheduled to being aquatic therapy this week.    -SP        PT Plan    PT Plan Comments  D/C to [facility]; patient to begin aquatic therapy.  -SP       User Key  (r) = Recorded By, (t) = Taken By, (c) = Cosigned By    Initials Name Provider Type    Priscila Wiggins, PT Physical Therapist          Modalities     Row Name 12/17/19 1700             Moist Heat    MH Applied  Yes  -SP      Location  L/S area and left knee  -SP      Rx Minutes  12 mins  -SP       Prior to Rx  Yes  -SP         ELECTRICAL STIMULATION    Attended/Unattended  Unattended  -SP    "   Stimulation Type  IFC  -SP      Location/Electrode Placement/Other  L/S area  -SP         Other Treatment Provided    Taping / Bracing  kinesiotape:  sciatica with \"I\" strip anchored medial central left buttocks and pulled across buttocks toward lateral hip; space correct in \"X'' over painful area in central left buttocks  -SP        User Key  (r) = Recorded By, (t) = Taken By, (c) = Cosigned By    Initials Name Provider Type    Priscila Wiggins, PT Physical Therapist        OP Exercises     Row Name 12/17/19 1700             Subjective Comments    Subjective Comments  Patient reports she followed up with new primary care practioner.  She was given orders for aquatic therapy and plans to begin this on Friday.  Patient is crying and reporting that she does not feel that doctors listen to her and she has so many things wrong with her.  Patient states that she has had increased pain in left buttocks area over the last week when she transfers sit to stand and felt \"pop, pop, pop\"  up her left side and has had increased left buttocks area pain since.    -SP         Exercise 1    Exercise Name 1  SKTC (with towel for assist)  -SP      Cueing 1  Verbal;Tactile  -SP      Reps 1  3  -SP      Time 1  20 sec  -SP         Exercise 2    Exercise Name 2  piriformis stretch  -SP      Cueing 2  Verbal;Demo  -SP      Reps 2  3  -SP      Time 2  20 sec  -SP         Exercise 5    Exercise Name 5  Hamstring stretch  -SP      Cueing 5  Verbal;Tactile  -SP      Reps 5  3  -SP      Time 5  20 sec  -SP         Exercise 7    Exercise Name 7  SLR  -SP      Cueing 7  Verbal;Tactile  -SP      Sets 7  2  -SP      Reps 7  10  -SP         Exercise 8    Exercise Name 8  SAQ with ball squeeze  -SP      Cueing 8  Verbal;Tactile  -SP      Reps 8  20  -SP      Time 8  3 sec  -SP        User Key  (r) = Recorded By, (t) = Taken By, (c) = Cosigned By    Initials Name Provider Type    Priscila Wiggins, PT Physical Therapist    "                   Manual Rx (last 36 hours)      Manual Treatments     Row Name 12/17/19 1700             Manual Rx 1    Manual Rx 1 Location  right innominate  -SP      Manual Rx 1 Type  MET: pelvic clearing and right anterior innominate correct  -SP        User Key  (r) = Recorded By, (t) = Taken By, (c) = Cosigned By    Initials Name Provider Type    Priscila Wiggins, PT Physical Therapist              Therapy Education  Given: HEP  Program: Reinforced  How Provided: Verbal  Provided to: Patient  Level of Understanding: Verbalized, Demonstrated              Time Calculation:   Start Time: 1600  Stop Time: 1710  Time Calculation (min): 70 min  Therapy Charges for Today     Code Description Service Date Service Provider Modifiers Qty    40319279420 HC PT ELECTRICAL STIM UNATTENDED 12/17/2019 Priscila Gonzalez, PT  1    73964466503 HC PT THER PROC EA 15 MIN 12/17/2019 Priscila Gonzalez, PT GP 1                    Priscila Gonzalez, PT  12/17/2019

## 2020-01-02 ENCOUNTER — TRANSCRIBE ORDERS (OUTPATIENT)
Dept: ADMINISTRATIVE | Facility: HOSPITAL | Age: 65
End: 2020-01-02

## 2020-01-02 DIAGNOSIS — M25.421 SWELLING OF JOINT OF UPPER ARM, RIGHT: Primary | ICD-10-CM

## 2020-01-07 ENCOUNTER — HOSPITAL ENCOUNTER (OUTPATIENT)
Dept: ULTRASOUND IMAGING | Facility: HOSPITAL | Age: 65
Discharge: HOME | End: 2020-01-07

## 2020-01-07 ENCOUNTER — APPOINTMENT (OUTPATIENT)
Dept: BONE DENSITY | Facility: HOSPITAL | Age: 65
End: 2020-01-07

## 2020-01-07 ENCOUNTER — HOSPITAL ENCOUNTER (OUTPATIENT)
Dept: MAMMOGRAPHY | Facility: HOSPITAL | Age: 65
Discharge: HOME | End: 2020-01-07
Admitting: FAMILY MEDICINE

## 2020-01-07 DIAGNOSIS — Z78.0 MENOPAUSE: ICD-10-CM

## 2020-01-07 DIAGNOSIS — M25.421 SWELLING OF JOINT OF UPPER ARM, RIGHT: ICD-10-CM

## 2020-01-07 DIAGNOSIS — Z12.31 VISIT FOR SCREENING MAMMOGRAM: ICD-10-CM

## 2020-01-07 PROCEDURE — 77067 SCR MAMMO BI INCL CAD: CPT

## 2020-01-07 PROCEDURE — 77080 DXA BONE DENSITY AXIAL: CPT

## 2020-01-07 PROCEDURE — 76999 ECHO EXAMINATION PROCEDURE: CPT

## 2020-01-07 PROCEDURE — 77063 BREAST TOMOSYNTHESIS BI: CPT

## 2020-01-07 PROCEDURE — 76882 US LMTD JT/FCL EVL NVASC XTR: CPT

## 2021-06-14 ENCOUNTER — TRANSCRIBE ORDERS (OUTPATIENT)
Dept: ADMINISTRATIVE | Facility: HOSPITAL | Age: 66
End: 2021-06-14

## 2021-06-14 DIAGNOSIS — R35.0 INCREASED FREQUENCY OF URINATION: Primary | ICD-10-CM

## 2021-06-17 ENCOUNTER — HOSPITAL ENCOUNTER (OUTPATIENT)
Dept: ULTRASOUND IMAGING | Facility: HOSPITAL | Age: 66
Discharge: HOME | End: 2021-06-17
Admitting: NURSE PRACTITIONER

## 2021-06-17 DIAGNOSIS — R35.0 INCREASED FREQUENCY OF URINATION: ICD-10-CM

## 2021-06-17 PROCEDURE — 76775 US EXAM ABDO BACK WALL LIM: CPT

## 2025-04-03 NOTE — THERAPY DISCHARGE NOTE
Outpatient Physical Therapy Rehab Program Treatment/Discharge       Patient Name: Celina Barry  : 1955  MRN: [id number]  Today's Date: 2018      Visit Date: 2018    Visit Dx:  No diagnosis found.    Patient Active Problem List   Diagnosis   • Urinary frequency   • Acute midline low back pain without sciatica        Past Medical History:   Diagnosis Date   • Osteoarthritis         Past Surgical History:   Procedure Laterality Date   • SHOULDER SURGERY Right                                                           PT OP Goals     Row Name 18 1045          PT Short Term Goals    STG 1 Patient demonstrates trunk AROM to wfl without complaints of pain at end ranges  -SP     STG 1 Progress Partially Met;Progressing  -SP     STG 2 Patient reports decreased radicular type symptoms into (B) LEs by 25%  -SP     STG 2 Progress Partially Met  -SP     STG 3 Patient reports she is able to tolerate sit or ride/drive car for 30 min with pain level <3/10 at worst  -SP     STG 3 Progress Partially Met  -SP        Long Term Goals    LTG 1 Patient reports improved ability to drive or sit for >1 hour with pain level <2/10  -SP     LTG 1 Progress Not Met  -SP     LTG 2 Patient demonstrates increased trunk and LE strength by one muscle grade to better tolerate ADLs  -SP     LTG 2 Progress Not Met  -SP     LTG 3 Patient independent with HEP  -SP     LTG 3 Progress Partially Met  -SP       User Key  (r) = Recorded By, (t) = Taken By, (c) = Cosigned By    Initials Name Provider Type    SP Priscila Gonzalez, PT Physical Therapist                    Time Calculation:       Therapy Suggested Charges     Code   Minutes Charges    None                     OP PT Discharge Summary  Date of Discharge: 18  Reason for Discharge: Maximum functional potential achieved  Outcomes Achieved: Patient able to partially acheive established goals  Discharge Destination: Home with home program      Priscila Gonzalez  PT  7/11/2018        Diet, NPO:   Except Medications (04-01-25 @ 21:03) [Active]